# Patient Record
Sex: MALE | Race: WHITE | Employment: OTHER | URBAN - METROPOLITAN AREA
[De-identification: names, ages, dates, MRNs, and addresses within clinical notes are randomized per-mention and may not be internally consistent; named-entity substitution may affect disease eponyms.]

---

## 2017-01-31 RX ORDER — ALBUTEROL SULFATE 90 UG/1
AEROSOL, METERED RESPIRATORY (INHALATION)
Qty: 6.7 INHALER | Refills: 1 | Status: SHIPPED | OUTPATIENT
Start: 2017-01-31 | End: 2017-03-06 | Stop reason: SDUPTHER

## 2017-03-06 RX ORDER — ALBUTEROL SULFATE 90 MCG
HFA AEROSOL WITH ADAPTER (GRAM) INHALATION
Qty: 6.7 INHALER | Refills: 1 | Status: SHIPPED | OUTPATIENT
Start: 2017-03-06 | End: 2018-03-30 | Stop reason: ALTCHOICE

## 2017-03-31 ENCOUNTER — OFFICE VISIT (OUTPATIENT)
Dept: INTERNAL MEDICINE | Age: 67
End: 2017-03-31

## 2017-03-31 VITALS
BODY MASS INDEX: 21.95 KG/M2 | HEART RATE: 86 BPM | DIASTOLIC BLOOD PRESSURE: 86 MMHG | WEIGHT: 144.8 LBS | SYSTOLIC BLOOD PRESSURE: 164 MMHG | RESPIRATION RATE: 16 BRPM | HEIGHT: 68 IN

## 2017-03-31 DIAGNOSIS — I10 ESSENTIAL HYPERTENSION, BENIGN: Primary | ICD-10-CM

## 2017-03-31 DIAGNOSIS — G25.81 RLS (RESTLESS LEGS SYNDROME): ICD-10-CM

## 2017-03-31 DIAGNOSIS — E78.2 MIXED HYPERLIPIDEMIA: ICD-10-CM

## 2017-03-31 DIAGNOSIS — R63.4 WEIGHT DECREASE: ICD-10-CM

## 2017-03-31 DIAGNOSIS — J30.2 SEASONAL ALLERGIC RHINITIS, UNSPECIFIED ALLERGIC RHINITIS TRIGGER: Chronic | ICD-10-CM

## 2017-03-31 DIAGNOSIS — M1A.0790 IDIOPATHIC CHRONIC GOUT OF FOOT WITHOUT TOPHUS, UNSPECIFIED LATERALITY: ICD-10-CM

## 2017-03-31 DIAGNOSIS — Z72.0 TOBACCO ABUSE: ICD-10-CM

## 2017-03-31 DIAGNOSIS — E11.9 TYPE 2 DIABETES MELLITUS WITHOUT COMPLICATION, WITHOUT LONG-TERM CURRENT USE OF INSULIN (HCC): ICD-10-CM

## 2017-03-31 DIAGNOSIS — R05.9 COUGH: ICD-10-CM

## 2017-03-31 PROCEDURE — 99214 OFFICE O/P EST MOD 30 MIN: CPT | Performed by: INTERNAL MEDICINE

## 2017-03-31 RX ORDER — LISINOPRIL 40 MG/1
TABLET ORAL
Qty: 90 TABLET | Refills: 1 | Status: ON HOLD | OUTPATIENT
Start: 2017-03-31 | End: 2017-05-04 | Stop reason: CLARIF

## 2017-03-31 RX ORDER — AMLODIPINE BESYLATE 10 MG/1
10 TABLET ORAL
COMMUNITY
Start: 2017-03-31 | End: 2017-07-03 | Stop reason: SDUPTHER

## 2017-03-31 ASSESSMENT — ENCOUNTER SYMPTOMS
RHINORRHEA: 1
WHEEZING: 1
EYES NEGATIVE: 1
GASTROINTESTINAL NEGATIVE: 1
COUGH: 1
SHORTNESS OF BREATH: 1
ALLERGIC/IMMUNOLOGIC NEGATIVE: 1

## 2017-04-14 ENCOUNTER — HOSPITAL ENCOUNTER (OUTPATIENT)
Dept: CT IMAGING | Age: 67
Discharge: HOME | End: 2017-04-14
Attending: INTERNAL MEDICINE | Admitting: INTERNAL MEDICINE

## 2017-04-14 ENCOUNTER — TELEPHONE (OUTPATIENT)
Dept: PULMONOLOGY | Age: 67
End: 2017-04-14

## 2017-04-14 DIAGNOSIS — Z72.0 TOBACCO ABUSE: ICD-10-CM

## 2017-04-14 DIAGNOSIS — R63.4 WEIGHT DECREASE: ICD-10-CM

## 2017-04-14 DIAGNOSIS — Z72.0 TOBACCO USE: ICD-10-CM

## 2017-04-20 ENCOUNTER — OFFICE VISIT (OUTPATIENT)
Dept: PULMONOLOGY | Age: 67
End: 2017-04-20

## 2017-04-20 VITALS
OXYGEN SATURATION: 96 % | DIASTOLIC BLOOD PRESSURE: 98 MMHG | HEART RATE: 122 BPM | RESPIRATION RATE: 16 BRPM | WEIGHT: 138 LBS | BODY MASS INDEX: 20.92 KG/M2 | SYSTOLIC BLOOD PRESSURE: 184 MMHG | HEIGHT: 68 IN

## 2017-04-20 DIAGNOSIS — R94.5 ABNORMAL RESULTS OF LIVER FUNCTION STUDIES: ICD-10-CM

## 2017-04-20 DIAGNOSIS — R05.9 COUGH: ICD-10-CM

## 2017-04-20 DIAGNOSIS — C34.01 MALIGNANT NEOPLASM OF HILUS OF RIGHT LUNG (HCC): Primary | ICD-10-CM

## 2017-04-20 DIAGNOSIS — Z72.0 TOBACCO ABUSE: ICD-10-CM

## 2017-04-20 PROCEDURE — 99204 OFFICE O/P NEW MOD 45 MIN: CPT | Performed by: INTERNAL MEDICINE

## 2017-04-21 ENCOUNTER — TELEPHONE (OUTPATIENT)
Dept: PULMONOLOGY | Age: 67
End: 2017-04-21

## 2017-04-21 ENCOUNTER — TELEPHONE (OUTPATIENT)
Dept: CARDIOTHORACIC SURGERY | Age: 67
End: 2017-04-21

## 2017-04-24 ENCOUNTER — HOSPITAL ENCOUNTER (OUTPATIENT)
Dept: ENDOSCOPY | Age: 67
Discharge: HOME | End: 2017-04-24
Attending: INTERNAL MEDICINE | Admitting: INTERNAL MEDICINE

## 2017-04-24 ENCOUNTER — TELEPHONE (OUTPATIENT)
Dept: PULMONOLOGY | Age: 67
End: 2017-04-24

## 2017-04-24 VITALS
TEMPERATURE: 97.3 F | OXYGEN SATURATION: 94 % | SYSTOLIC BLOOD PRESSURE: 152 MMHG | BODY MASS INDEX: 20.92 KG/M2 | DIASTOLIC BLOOD PRESSURE: 73 MMHG | HEIGHT: 68 IN | HEART RATE: 96 BPM | WEIGHT: 138 LBS | RESPIRATION RATE: 23 BRPM

## 2017-04-24 LAB
ABO/RH: NORMAL
ANTIBODY SCREEN: NORMAL
GLUCOSE BLD-MCNC: 159 MG/DL (ref 70–99)
PERFORMED ON: ABNORMAL

## 2017-04-24 PROCEDURE — 31640 BRONCHOSCOPY W/TUMOR EXCISE: CPT | Performed by: INTERNAL MEDICINE

## 2017-04-24 PROCEDURE — 31629 BRONCHOSCOPY/NEEDLE BX EACH: CPT | Performed by: INTERNAL MEDICINE

## 2017-04-24 PROCEDURE — 31623 DX BRONCHOSCOPE/BRUSH: CPT | Performed by: INTERNAL MEDICINE

## 2017-04-24 PROCEDURE — 31628 BRONCHOSCOPY/LUNG BX EACH: CPT | Performed by: INTERNAL MEDICINE

## 2017-04-24 RX ORDER — MEPERIDINE HYDROCHLORIDE 25 MG/ML
12.5 INJECTION INTRAMUSCULAR; INTRAVENOUS; SUBCUTANEOUS EVERY 5 MIN PRN
Status: DISCONTINUED | OUTPATIENT
Start: 2017-04-24 | End: 2017-04-25 | Stop reason: HOSPADM

## 2017-04-24 RX ORDER — HYDRALAZINE HYDROCHLORIDE 20 MG/ML
5 INJECTION INTRAMUSCULAR; INTRAVENOUS EVERY 10 MIN PRN
Status: DISCONTINUED | OUTPATIENT
Start: 2017-04-24 | End: 2017-04-25 | Stop reason: HOSPADM

## 2017-04-24 RX ORDER — DIPHENHYDRAMINE HYDROCHLORIDE 50 MG/ML
12.5 INJECTION INTRAMUSCULAR; INTRAVENOUS
Status: ACTIVE | OUTPATIENT
Start: 2017-04-24 | End: 2017-04-24

## 2017-04-24 RX ORDER — SODIUM CHLORIDE, SODIUM LACTATE, POTASSIUM CHLORIDE, CALCIUM CHLORIDE 600; 310; 30; 20 MG/100ML; MG/100ML; MG/100ML; MG/100ML
INJECTION, SOLUTION INTRAVENOUS CONTINUOUS
Status: CANCELLED | OUTPATIENT
Start: 2017-04-24

## 2017-04-24 RX ORDER — PROMETHAZINE HYDROCHLORIDE 25 MG/ML
6.25 INJECTION, SOLUTION INTRAMUSCULAR; INTRAVENOUS
Status: ACTIVE | OUTPATIENT
Start: 2017-04-24 | End: 2017-04-24

## 2017-04-24 RX ORDER — LABETALOL HYDROCHLORIDE 5 MG/ML
5 INJECTION, SOLUTION INTRAVENOUS EVERY 10 MIN PRN
Status: DISCONTINUED | OUTPATIENT
Start: 2017-04-24 | End: 2017-04-25 | Stop reason: HOSPADM

## 2017-04-24 RX ORDER — ALBUTEROL SULFATE 2.5 MG/3ML
2.5 SOLUTION RESPIRATORY (INHALATION) ONCE
Status: COMPLETED | OUTPATIENT
Start: 2017-04-24 | End: 2017-04-24

## 2017-04-24 RX ORDER — FENTANYL CITRATE 50 UG/ML
25 INJECTION, SOLUTION INTRAMUSCULAR; INTRAVENOUS EVERY 5 MIN PRN
Status: DISCONTINUED | OUTPATIENT
Start: 2017-04-24 | End: 2017-04-25 | Stop reason: HOSPADM

## 2017-04-24 RX ORDER — ONDANSETRON 2 MG/ML
4 INJECTION INTRAMUSCULAR; INTRAVENOUS
Status: ACTIVE | OUTPATIENT
Start: 2017-04-24 | End: 2017-04-24

## 2017-04-24 RX ADMIN — ALBUTEROL SULFATE 2.5 MG: 2.5 SOLUTION RESPIRATORY (INHALATION) at 16:57

## 2017-04-24 ASSESSMENT — PAIN SCALES - GENERAL: PAINLEVEL_OUTOF10: 0

## 2017-04-25 ENCOUNTER — TELEPHONE (OUTPATIENT)
Dept: CARDIOTHORACIC SURGERY | Age: 67
End: 2017-04-25

## 2017-04-27 ENCOUNTER — HOSPITAL ENCOUNTER (OUTPATIENT)
Dept: ENDOSCOPY | Age: 67
Discharge: HOME | End: 2017-04-27

## 2017-04-27 ENCOUNTER — HOSPITAL ENCOUNTER (OUTPATIENT)
Dept: ENDOSCOPY | Age: 67
Discharge: HOME | End: 2017-04-27
Attending: THORACIC SURGERY (CARDIOTHORACIC VASCULAR SURGERY) | Admitting: THORACIC SURGERY (CARDIOTHORACIC VASCULAR SURGERY)

## 2017-04-27 VITALS
HEIGHT: 68 IN | DIASTOLIC BLOOD PRESSURE: 81 MMHG | RESPIRATION RATE: 18 BRPM | HEART RATE: 74 BPM | BODY MASS INDEX: 20.92 KG/M2 | SYSTOLIC BLOOD PRESSURE: 161 MMHG | WEIGHT: 138 LBS | OXYGEN SATURATION: 98 % | TEMPERATURE: 97.4 F

## 2017-04-27 VITALS
OXYGEN SATURATION: 98 % | DIASTOLIC BLOOD PRESSURE: 84 MMHG | TEMPERATURE: 97.8 F | RESPIRATION RATE: 16 BRPM | SYSTOLIC BLOOD PRESSURE: 168 MMHG | HEART RATE: 77 BPM

## 2017-04-27 LAB
GLUCOSE BLD-MCNC: 142 MG/DL (ref 70–99)
PERFORMED ON: ABNORMAL

## 2017-04-27 RX ORDER — HYDRALAZINE HYDROCHLORIDE 20 MG/ML
5 INJECTION INTRAMUSCULAR; INTRAVENOUS EVERY 5 MIN PRN
Status: DISCONTINUED | OUTPATIENT
Start: 2017-04-27 | End: 2017-04-28 | Stop reason: HOSPADM

## 2017-04-27 RX ORDER — MEPERIDINE HYDROCHLORIDE 25 MG/ML
12.5 INJECTION INTRAMUSCULAR; INTRAVENOUS; SUBCUTANEOUS EVERY 5 MIN PRN
Status: DISCONTINUED | OUTPATIENT
Start: 2017-04-27 | End: 2017-04-28 | Stop reason: HOSPADM

## 2017-04-27 RX ORDER — ONDANSETRON 2 MG/ML
4 INJECTION INTRAMUSCULAR; INTRAVENOUS
Status: ACTIVE | OUTPATIENT
Start: 2017-04-27 | End: 2017-04-27

## 2017-04-27 RX ORDER — FENTANYL CITRATE 50 UG/ML
25 INJECTION, SOLUTION INTRAMUSCULAR; INTRAVENOUS EVERY 5 MIN PRN
Status: DISCONTINUED | OUTPATIENT
Start: 2017-04-27 | End: 2017-04-28 | Stop reason: HOSPADM

## 2017-04-27 RX ORDER — LABETALOL HYDROCHLORIDE 5 MG/ML
5 INJECTION, SOLUTION INTRAVENOUS EVERY 5 MIN PRN
Status: DISCONTINUED | OUTPATIENT
Start: 2017-04-27 | End: 2017-04-28 | Stop reason: HOSPADM

## 2017-04-27 ASSESSMENT — PAIN SCALES - GENERAL
PAINLEVEL_OUTOF10: 0
PAINLEVEL_OUTOF10: 0

## 2017-04-28 ENCOUNTER — TELEPHONE (OUTPATIENT)
Dept: CARDIOTHORACIC SURGERY | Age: 67
End: 2017-04-28

## 2017-05-03 PROBLEM — C77.1 SECONDARY MALIGNANT NEOPLASM OF INTRATHORACIC LYMPH NODES (HCC): Status: ACTIVE | Noted: 2017-05-03

## 2017-05-03 PROBLEM — C34.2 PRIMARY CANCER OF RIGHT MIDDLE LOBE OF LUNG (HCC): Status: ACTIVE | Noted: 2017-05-03

## 2017-05-05 ENCOUNTER — HOSPITAL ENCOUNTER (OUTPATIENT)
Dept: MRI IMAGING | Age: 67
Discharge: HOME | End: 2017-05-05
Attending: INTERNAL MEDICINE | Admitting: INTERNAL MEDICINE

## 2017-05-05 DIAGNOSIS — C34.2 MALIGNANT NEOPLASM OF MIDDLE LOBE, BRONCHUS OR LUNG (HCC): ICD-10-CM

## 2017-05-11 ENCOUNTER — TELEPHONE (OUTPATIENT)
Dept: PULMONOLOGY | Age: 67
End: 2017-05-11

## 2017-05-11 DIAGNOSIS — J95.811 POSTPROCEDURAL PNEUMOTHORAX: Primary | ICD-10-CM

## 2017-05-12 ENCOUNTER — TELEPHONE (OUTPATIENT)
Dept: PULMONOLOGY | Age: 67
End: 2017-05-12

## 2017-05-12 ENCOUNTER — HOSPITAL ENCOUNTER (OUTPATIENT)
Dept: CT IMAGING | Age: 67
Discharge: HOME | End: 2017-05-12
Attending: INTERNAL MEDICINE | Admitting: INTERNAL MEDICINE

## 2017-05-12 DIAGNOSIS — J95.811 POSTPROCEDURAL PNEUMOTHORAX: ICD-10-CM

## 2017-05-17 ENCOUNTER — OFFICE VISIT (OUTPATIENT)
Dept: CARDIOTHORACIC SURGERY | Age: 67
End: 2017-05-17

## 2017-05-17 VITALS
HEIGHT: 68 IN | SYSTOLIC BLOOD PRESSURE: 138 MMHG | OXYGEN SATURATION: 98 % | WEIGHT: 137 LBS | DIASTOLIC BLOOD PRESSURE: 64 MMHG | TEMPERATURE: 98.3 F | HEART RATE: 89 BPM | BODY MASS INDEX: 20.76 KG/M2

## 2017-05-17 DIAGNOSIS — J98.11 ATELECTASIS OF RIGHT LUNG: Primary | ICD-10-CM

## 2017-05-17 DIAGNOSIS — R91.8 LUNG MASS: ICD-10-CM

## 2017-05-17 PROCEDURE — 99213 OFFICE O/P EST LOW 20 MIN: CPT | Performed by: THORACIC SURGERY (CARDIOTHORACIC VASCULAR SURGERY)

## 2017-05-18 PROBLEM — Z51.11 ENCOUNTER FOR ANTINEOPLASTIC CHEMOTHERAPY: Status: ACTIVE | Noted: 2017-05-18

## 2017-05-31 ENCOUNTER — OFFICE VISIT (OUTPATIENT)
Dept: PULMONOLOGY | Age: 67
End: 2017-05-31

## 2017-05-31 VITALS
BODY MASS INDEX: 20.76 KG/M2 | HEIGHT: 68 IN | HEART RATE: 114 BPM | RESPIRATION RATE: 16 BRPM | SYSTOLIC BLOOD PRESSURE: 160 MMHG | DIASTOLIC BLOOD PRESSURE: 82 MMHG | OXYGEN SATURATION: 99 % | WEIGHT: 137 LBS

## 2017-05-31 DIAGNOSIS — J95.811 POSTPROCEDURAL PNEUMOTHORAX: ICD-10-CM

## 2017-05-31 DIAGNOSIS — C34.91 SQUAMOUS CELL LUNG CANCER, RIGHT (HCC): Primary | ICD-10-CM

## 2017-05-31 PROCEDURE — 99214 OFFICE O/P EST MOD 30 MIN: CPT | Performed by: INTERNAL MEDICINE

## 2017-06-07 ENCOUNTER — OFFICE VISIT (OUTPATIENT)
Dept: CARDIOTHORACIC SURGERY | Age: 67
End: 2017-06-07

## 2017-06-07 VITALS
HEIGHT: 68 IN | SYSTOLIC BLOOD PRESSURE: 122 MMHG | DIASTOLIC BLOOD PRESSURE: 80 MMHG | BODY MASS INDEX: 20.61 KG/M2 | WEIGHT: 136 LBS | HEART RATE: 111 BPM | TEMPERATURE: 97.1 F | OXYGEN SATURATION: 99 %

## 2017-06-07 DIAGNOSIS — C34.91 SQUAMOUS CELL LUNG CANCER, RIGHT (HCC): ICD-10-CM

## 2017-06-07 DIAGNOSIS — Z72.0 TOBACCO ABUSE: ICD-10-CM

## 2017-06-07 DIAGNOSIS — R05.9 COUGH: Primary | ICD-10-CM

## 2017-06-07 PROCEDURE — 99212 OFFICE O/P EST SF 10 MIN: CPT | Performed by: THORACIC SURGERY (CARDIOTHORACIC VASCULAR SURGERY)

## 2017-07-03 RX ORDER — AMLODIPINE BESYLATE 10 MG/1
TABLET ORAL
Qty: 30 TABLET | Refills: 2 | Status: SHIPPED | OUTPATIENT
Start: 2017-07-03 | End: 2017-08-22 | Stop reason: SDUPTHER

## 2017-07-12 ENCOUNTER — OFFICE VISIT (OUTPATIENT)
Dept: CARDIOTHORACIC SURGERY | Age: 67
End: 2017-07-12

## 2017-07-12 ENCOUNTER — TELEPHONE (OUTPATIENT)
Dept: PULMONOLOGY | Age: 67
End: 2017-07-12

## 2017-07-12 ENCOUNTER — OFFICE VISIT (OUTPATIENT)
Dept: PULMONOLOGY | Age: 67
End: 2017-07-12

## 2017-07-12 VITALS
WEIGHT: 129.6 LBS | SYSTOLIC BLOOD PRESSURE: 130 MMHG | HEIGHT: 68 IN | BODY MASS INDEX: 19.64 KG/M2 | DIASTOLIC BLOOD PRESSURE: 74 MMHG | RESPIRATION RATE: 18 BRPM | HEART RATE: 119 BPM | OXYGEN SATURATION: 100 %

## 2017-07-12 VITALS
SYSTOLIC BLOOD PRESSURE: 120 MMHG | OXYGEN SATURATION: 100 % | HEIGHT: 68 IN | TEMPERATURE: 97.2 F | BODY MASS INDEX: 19.55 KG/M2 | HEART RATE: 107 BPM | DIASTOLIC BLOOD PRESSURE: 80 MMHG | WEIGHT: 129 LBS

## 2017-07-12 DIAGNOSIS — C34.2 PRIMARY CANCER OF RIGHT MIDDLE LOBE OF LUNG (HCC): ICD-10-CM

## 2017-07-12 DIAGNOSIS — C34.91 SQUAMOUS CELL LUNG CANCER, RIGHT (HCC): Primary | ICD-10-CM

## 2017-07-12 DIAGNOSIS — J42 CHRONIC BRONCHITIS, UNSPECIFIED CHRONIC BRONCHITIS TYPE (HCC): ICD-10-CM

## 2017-07-12 DIAGNOSIS — J95.811 POSTPROCEDURAL PNEUMOTHORAX: Primary | ICD-10-CM

## 2017-07-12 PROCEDURE — 99212 OFFICE O/P EST SF 10 MIN: CPT | Performed by: THORACIC SURGERY (CARDIOTHORACIC VASCULAR SURGERY)

## 2017-07-12 PROCEDURE — 99214 OFFICE O/P EST MOD 30 MIN: CPT | Performed by: INTERNAL MEDICINE

## 2017-08-15 ENCOUNTER — HOSPITAL ENCOUNTER (OUTPATIENT)
Dept: CT IMAGING | Age: 67
Discharge: HOME | End: 2017-08-15
Attending: INTERNAL MEDICINE | Admitting: INTERNAL MEDICINE

## 2017-08-15 DIAGNOSIS — C34.2 PRIMARY CANCER OF RIGHT MIDDLE LOBE OF LUNG (HCC): ICD-10-CM

## 2017-08-15 DIAGNOSIS — C34.2 MALIGNANT NEOPLASM OF MIDDLE LOBE, BRONCHUS OR LUNG (HCC): ICD-10-CM

## 2017-08-16 ENCOUNTER — OFFICE VISIT (OUTPATIENT)
Dept: CARDIOTHORACIC SURGERY | Age: 67
End: 2017-08-16

## 2017-08-16 VITALS
BODY MASS INDEX: 19.4 KG/M2 | SYSTOLIC BLOOD PRESSURE: 138 MMHG | WEIGHT: 128 LBS | HEIGHT: 68 IN | HEART RATE: 97 BPM | TEMPERATURE: 97.9 F | OXYGEN SATURATION: 99 % | DIASTOLIC BLOOD PRESSURE: 86 MMHG

## 2017-08-16 DIAGNOSIS — R91.8 LUNG MASS: ICD-10-CM

## 2017-08-16 DIAGNOSIS — Z72.0 TOBACCO ABUSE: ICD-10-CM

## 2017-08-16 DIAGNOSIS — C34.2 MALIGNANT NEOPLASM OF MIDDLE LOBE OF RIGHT LUNG (HCC): Primary | ICD-10-CM

## 2017-08-16 PROCEDURE — 99212 OFFICE O/P EST SF 10 MIN: CPT | Performed by: THORACIC SURGERY (CARDIOTHORACIC VASCULAR SURGERY)

## 2017-08-18 ENCOUNTER — TELEPHONE (OUTPATIENT)
Dept: CARDIOTHORACIC SURGERY | Age: 67
End: 2017-08-18

## 2017-08-22 ENCOUNTER — TELEPHONE (OUTPATIENT)
Dept: INTERNAL MEDICINE | Age: 67
End: 2017-08-22

## 2017-08-22 RX ORDER — AMLODIPINE BESYLATE 10 MG/1
TABLET ORAL
Qty: 90 TABLET | Refills: 0 | Status: SHIPPED | OUTPATIENT
Start: 2017-08-22 | End: 2017-11-17 | Stop reason: SDUPTHER

## 2017-08-24 ENCOUNTER — TELEPHONE (OUTPATIENT)
Dept: CARDIOTHORACIC SURGERY | Age: 67
End: 2017-08-24

## 2017-08-24 DIAGNOSIS — C34.11 MALIGNANT NEOPLASM OF UPPER LOBE OF RIGHT LUNG (HCC): Primary | ICD-10-CM

## 2017-08-24 RX ORDER — PROMETHAZINE HYDROCHLORIDE AND CODEINE PHOSPHATE 6.25; 1 MG/5ML; MG/5ML
SYRUP ORAL
Qty: 150 ML | Refills: 0 | OUTPATIENT
Start: 2017-08-24 | End: 2017-09-20

## 2017-08-30 ENCOUNTER — OFFICE VISIT (OUTPATIENT)
Dept: CARDIOTHORACIC SURGERY | Age: 67
End: 2017-08-30

## 2017-08-30 VITALS
HEART RATE: 90 BPM | TEMPERATURE: 98 F | HEIGHT: 68 IN | DIASTOLIC BLOOD PRESSURE: 80 MMHG | BODY MASS INDEX: 19.85 KG/M2 | WEIGHT: 131 LBS | OXYGEN SATURATION: 99 % | SYSTOLIC BLOOD PRESSURE: 132 MMHG

## 2017-08-30 DIAGNOSIS — C34.2 PRIMARY CANCER OF RIGHT MIDDLE LOBE OF LUNG (HCC): Primary | ICD-10-CM

## 2017-08-30 DIAGNOSIS — R91.8 LUNG MASS: ICD-10-CM

## 2017-08-30 PROCEDURE — 99212 OFFICE O/P EST SF 10 MIN: CPT | Performed by: THORACIC SURGERY (CARDIOTHORACIC VASCULAR SURGERY)

## 2017-09-01 ENCOUNTER — TELEPHONE (OUTPATIENT)
Dept: CARDIOTHORACIC SURGERY | Age: 67
End: 2017-09-01

## 2017-09-06 ENCOUNTER — HOSPITAL ENCOUNTER (OUTPATIENT)
Dept: PREADMISSION TESTING | Age: 67
Discharge: HOME | End: 2017-09-06
Attending: THORACIC SURGERY (CARDIOTHORACIC VASCULAR SURGERY) | Admitting: THORACIC SURGERY (CARDIOTHORACIC VASCULAR SURGERY)

## 2017-09-06 VITALS
HEART RATE: 116 BPM | WEIGHT: 132 LBS | BODY MASS INDEX: 20 KG/M2 | HEIGHT: 68 IN | SYSTOLIC BLOOD PRESSURE: 174 MMHG | TEMPERATURE: 97.4 F | RESPIRATION RATE: 18 BRPM | DIASTOLIC BLOOD PRESSURE: 95 MMHG

## 2017-09-06 LAB
ABO/RH: NORMAL
ALBUMIN SERPL-MCNC: 4.4 G/DL (ref 3.4–5)
ALP BLD-CCNC: 137 U/L (ref 40–129)
ALT SERPL-CCNC: 10 U/L (ref 10–40)
ANION GAP SERPL CALCULATED.3IONS-SCNC: 17 MMOL/L (ref 3–16)
ANTIBODY SCREEN: NORMAL
APTT: 29.8 SEC (ref 24.1–34.9)
AST SERPL-CCNC: 13 U/L (ref 15–37)
BASOPHILS ABSOLUTE: 0.1 K/UL (ref 0–0.2)
BASOPHILS RELATIVE PERCENT: 1 %
BILIRUB SERPL-MCNC: <0.2 MG/DL (ref 0–1)
BILIRUBIN DIRECT: <0.2 MG/DL (ref 0–0.3)
BILIRUBIN URINE: NEGATIVE
BILIRUBIN, INDIRECT: ABNORMAL MG/DL (ref 0–1)
BLOOD, URINE: NEGATIVE
BUN BLDV-MCNC: 14 MG/DL (ref 7–20)
CALCIUM SERPL-MCNC: 10.1 MG/DL (ref 8.3–10.6)
CHLORIDE BLD-SCNC: 95 MMOL/L (ref 99–110)
CLARITY: CLEAR
CO2: 25 MMOL/L (ref 21–32)
COLOR: YELLOW
CREAT SERPL-MCNC: 0.7 MG/DL (ref 0.8–1.3)
EKG ATRIAL RATE: 97 BPM
EKG DIAGNOSIS: NORMAL
EKG P AXIS: 56 DEGREES
EKG P-R INTERVAL: 154 MS
EKG Q-T INTERVAL: 334 MS
EKG QRS DURATION: 74 MS
EKG QTC CALCULATION (BAZETT): 424 MS
EKG R AXIS: 76 DEGREES
EKG T AXIS: 66 DEGREES
EKG VENTRICULAR RATE: 97 BPM
EOSINOPHILS ABSOLUTE: 0.1 K/UL (ref 0–0.6)
EOSINOPHILS RELATIVE PERCENT: 1.6 %
GFR AFRICAN AMERICAN: >60
GFR NON-AFRICAN AMERICAN: >60
GLUCOSE BLD-MCNC: 101 MG/DL (ref 70–99)
GLUCOSE URINE: NEGATIVE MG/DL
HCT VFR BLD CALC: 35.6 % (ref 40.5–52.5)
HEMOGLOBIN: 12 G/DL (ref 13.5–17.5)
INR BLD: 0.88 (ref 0.85–1.15)
KETONES, URINE: NEGATIVE MG/DL
LEUKOCYTE ESTERASE, URINE: NEGATIVE
LYMPHOCYTES ABSOLUTE: 0.9 K/UL (ref 1–5.1)
LYMPHOCYTES RELATIVE PERCENT: 13.8 %
MAGNESIUM: 2 MG/DL (ref 1.8–2.4)
MCH RBC QN AUTO: 31.9 PG (ref 26–34)
MCHC RBC AUTO-ENTMCNC: 33.7 G/DL (ref 31–36)
MCV RBC AUTO: 94.5 FL (ref 80–100)
MICROSCOPIC EXAMINATION: NORMAL
MONOCYTES ABSOLUTE: 0.6 K/UL (ref 0–1.3)
MONOCYTES RELATIVE PERCENT: 8.4 %
NEUTROPHILS ABSOLUTE: 5 K/UL (ref 1.7–7.7)
NEUTROPHILS RELATIVE PERCENT: 75.2 %
NITRITE, URINE: NEGATIVE
PDW BLD-RTO: 16.2 % (ref 12.4–15.4)
PH UA: 6
PLATELET # BLD: 236 K/UL (ref 135–450)
PMV BLD AUTO: 7.2 FL (ref 5–10.5)
POTASSIUM SERPL-SCNC: 4.2 MMOL/L (ref 3.5–5.1)
PROTEIN UA: NEGATIVE MG/DL
PROTHROMBIN TIME: 9.9 SEC (ref 9.6–13)
RBC # BLD: 3.77 M/UL (ref 4.2–5.9)
SODIUM BLD-SCNC: 137 MMOL/L (ref 136–145)
SPECIFIC GRAVITY UA: 1.01
TOTAL PROTEIN: 8.2 G/DL (ref 6.4–8.2)
URINE TYPE: NORMAL
UROBILINOGEN, URINE: 0.2 E.U./DL
WBC # BLD: 6.6 K/UL (ref 4–11)

## 2017-09-06 PROCEDURE — 93010 ELECTROCARDIOGRAM REPORT: CPT | Performed by: INTERNAL MEDICINE

## 2017-09-07 LAB — URINE CULTURE, ROUTINE: NORMAL

## 2017-09-12 ENCOUNTER — HOSPITAL ENCOUNTER (OUTPATIENT)
Dept: ENDOSCOPY | Age: 67
Discharge: HOME | End: 2017-09-12
Attending: THORACIC SURGERY (CARDIOTHORACIC VASCULAR SURGERY) | Admitting: THORACIC SURGERY (CARDIOTHORACIC VASCULAR SURGERY)

## 2017-09-12 VITALS
BODY MASS INDEX: 20 KG/M2 | HEART RATE: 88 BPM | WEIGHT: 132 LBS | RESPIRATION RATE: 18 BRPM | DIASTOLIC BLOOD PRESSURE: 80 MMHG | OXYGEN SATURATION: 96 % | HEIGHT: 68 IN | TEMPERATURE: 97.5 F | SYSTOLIC BLOOD PRESSURE: 144 MMHG

## 2017-09-12 LAB
ABO/RH: NORMAL
ANTIBODY SCREEN: NORMAL
GLUCOSE BLD-MCNC: 135 MG/DL (ref 70–99)
GLUCOSE BLD-MCNC: 157 MG/DL (ref 70–99)
PERFORMED ON: ABNORMAL
PERFORMED ON: ABNORMAL

## 2017-09-12 PROCEDURE — 31645 BRNCHSC W/THER ASPIR 1ST: CPT | Performed by: INTERNAL MEDICINE

## 2017-09-12 PROCEDURE — 31652 BRONCH EBUS SAMPLNG 1/2 NODE: CPT | Performed by: INTERNAL MEDICINE

## 2017-09-12 PROCEDURE — 31623 DX BRONCHOSCOPE/BRUSH: CPT | Performed by: INTERNAL MEDICINE

## 2017-09-12 RX ORDER — SODIUM CHLORIDE 0.9 % (FLUSH) 0.9 %
10 SYRINGE (ML) INJECTION PRN
Status: DISCONTINUED | OUTPATIENT
Start: 2017-09-12 | End: 2017-09-13 | Stop reason: HOSPADM

## 2017-09-12 RX ORDER — SODIUM CHLORIDE 9 MG/ML
INJECTION, SOLUTION INTRAVENOUS CONTINUOUS
Status: DISCONTINUED | OUTPATIENT
Start: 2017-09-12 | End: 2017-09-13 | Stop reason: HOSPADM

## 2017-09-12 RX ORDER — ONDANSETRON 2 MG/ML
4 INJECTION INTRAMUSCULAR; INTRAVENOUS ONCE
Status: COMPLETED | OUTPATIENT
Start: 2017-09-12 | End: 2017-09-12

## 2017-09-12 RX ORDER — ENALAPRILAT 2.5 MG/2ML
1.25 INJECTION INTRAVENOUS
Status: ACTIVE | OUTPATIENT
Start: 2017-09-12 | End: 2017-09-12

## 2017-09-12 RX ORDER — FENTANYL CITRATE 50 UG/ML
25 INJECTION, SOLUTION INTRAMUSCULAR; INTRAVENOUS EVERY 5 MIN PRN
Status: DISCONTINUED | OUTPATIENT
Start: 2017-09-12 | End: 2017-09-13 | Stop reason: HOSPADM

## 2017-09-12 RX ORDER — GLYCOPYRROLATE 0.2 MG/ML
0.2 INJECTION INTRAMUSCULAR; INTRAVENOUS ONCE
Status: COMPLETED | OUTPATIENT
Start: 2017-09-12 | End: 2017-09-12

## 2017-09-12 RX ORDER — CHLORHEXIDINE GLUCONATE 0.12 MG/ML
15 RINSE ORAL 2 TIMES DAILY
Status: DISCONTINUED | OUTPATIENT
Start: 2017-09-12 | End: 2017-09-13 | Stop reason: HOSPADM

## 2017-09-12 RX ORDER — MORPHINE SULFATE 2 MG/ML
2 INJECTION, SOLUTION INTRAMUSCULAR; INTRAVENOUS EVERY 5 MIN PRN
Status: DISCONTINUED | OUTPATIENT
Start: 2017-09-12 | End: 2017-09-13 | Stop reason: HOSPADM

## 2017-09-12 RX ORDER — LIDOCAINE HYDROCHLORIDE 10 MG/ML
1 INJECTION, SOLUTION EPIDURAL; INFILTRATION; INTRACAUDAL; PERINEURAL
Status: ACTIVE | OUTPATIENT
Start: 2017-09-12 | End: 2017-09-12

## 2017-09-12 RX ORDER — SODIUM CHLORIDE 0.9 % (FLUSH) 0.9 %
10 SYRINGE (ML) INJECTION EVERY 12 HOURS SCHEDULED
Status: DISCONTINUED | OUTPATIENT
Start: 2017-09-12 | End: 2017-09-13 | Stop reason: HOSPADM

## 2017-09-12 RX ORDER — LABETALOL HYDROCHLORIDE 5 MG/ML
5 INJECTION, SOLUTION INTRAVENOUS EVERY 10 MIN PRN
Status: DISCONTINUED | OUTPATIENT
Start: 2017-09-12 | End: 2017-09-13 | Stop reason: HOSPADM

## 2017-09-12 RX ORDER — ONDANSETRON 2 MG/ML
4 INJECTION INTRAMUSCULAR; INTRAVENOUS
Status: ACTIVE | OUTPATIENT
Start: 2017-09-12 | End: 2017-09-12

## 2017-09-12 RX ORDER — SODIUM CHLORIDE, SODIUM LACTATE, POTASSIUM CHLORIDE, CALCIUM CHLORIDE 600; 310; 30; 20 MG/100ML; MG/100ML; MG/100ML; MG/100ML
INJECTION, SOLUTION INTRAVENOUS CONTINUOUS
Status: DISCONTINUED | OUTPATIENT
Start: 2017-09-12 | End: 2017-09-13 | Stop reason: HOSPADM

## 2017-09-12 RX ORDER — HYDRALAZINE HYDROCHLORIDE 20 MG/ML
5 INJECTION INTRAMUSCULAR; INTRAVENOUS EVERY 5 MIN PRN
Status: DISCONTINUED | OUTPATIENT
Start: 2017-09-12 | End: 2017-09-13 | Stop reason: HOSPADM

## 2017-09-12 ASSESSMENT — PAIN DESCRIPTION - LOCATION: LOCATION: THROAT

## 2017-09-12 ASSESSMENT — PAIN DESCRIPTION - DESCRIPTORS: DESCRIPTORS: ACHING

## 2017-09-12 ASSESSMENT — PAIN SCALES - GENERAL
PAINLEVEL_OUTOF10: 3
PAINLEVEL_OUTOF10: 4

## 2017-09-12 ASSESSMENT — PAIN - FUNCTIONAL ASSESSMENT: PAIN_FUNCTIONAL_ASSESSMENT: 0-10

## 2017-09-12 ASSESSMENT — PAIN DESCRIPTION - PAIN TYPE: TYPE: SURGICAL PAIN

## 2017-09-14 ENCOUNTER — TELEPHONE (OUTPATIENT)
Dept: CARDIOTHORACIC SURGERY | Age: 67
End: 2017-09-14

## 2017-09-20 ENCOUNTER — OFFICE VISIT (OUTPATIENT)
Dept: CARDIOTHORACIC SURGERY | Age: 67
End: 2017-09-20

## 2017-09-20 VITALS
OXYGEN SATURATION: 98 % | SYSTOLIC BLOOD PRESSURE: 146 MMHG | WEIGHT: 135 LBS | DIASTOLIC BLOOD PRESSURE: 96 MMHG | TEMPERATURE: 97.7 F | BODY MASS INDEX: 20.46 KG/M2 | HEIGHT: 68 IN | HEART RATE: 78 BPM

## 2017-09-20 DIAGNOSIS — C34.01 LUNG CANCER, MAIN BRONCHUS, RIGHT (HCC): Primary | ICD-10-CM

## 2017-09-20 PROCEDURE — 99212 OFFICE O/P EST SF 10 MIN: CPT | Performed by: THORACIC SURGERY (CARDIOTHORACIC VASCULAR SURGERY)

## 2017-09-22 ENCOUNTER — OFFICE VISIT (OUTPATIENT)
Dept: PULMONOLOGY | Age: 67
End: 2017-09-22

## 2017-09-22 VITALS
RESPIRATION RATE: 16 BRPM | BODY MASS INDEX: 20.61 KG/M2 | HEART RATE: 102 BPM | OXYGEN SATURATION: 99 % | HEIGHT: 68 IN | DIASTOLIC BLOOD PRESSURE: 70 MMHG | WEIGHT: 136 LBS | SYSTOLIC BLOOD PRESSURE: 138 MMHG

## 2017-09-22 DIAGNOSIS — C34.11 MALIGNANT NEOPLASM OF UPPER LOBE OF RIGHT LUNG (HCC): Primary | ICD-10-CM

## 2017-09-22 DIAGNOSIS — J44.9 COPD, SEVERITY TO BE DETERMINED (HCC): ICD-10-CM

## 2017-09-22 PROCEDURE — 99214 OFFICE O/P EST MOD 30 MIN: CPT | Performed by: INTERNAL MEDICINE

## 2017-09-25 ENCOUNTER — TELEPHONE (OUTPATIENT)
Dept: CARDIOTHORACIC SURGERY | Age: 67
End: 2017-09-25

## 2017-10-27 ENCOUNTER — HOSPITAL ENCOUNTER (OUTPATIENT)
Dept: PULMONOLOGY | Age: 67
Discharge: HOME | End: 2017-10-27
Attending: ORTHOPAEDIC SURGERY | Admitting: ORTHOPAEDIC SURGERY

## 2017-10-27 DIAGNOSIS — C34.11 MALIGNANT NEOPLASM OF UPPER LOBE OF RIGHT LUNG (HCC): ICD-10-CM

## 2017-10-27 DIAGNOSIS — C34.11 MALIGNANT NEOPLASM OF UPPER LOBE, RIGHT BRONCHUS OR LUNG (HCC): ICD-10-CM

## 2017-10-27 RX ORDER — ALBUTEROL SULFATE 2.5 MG/3ML
2.5 SOLUTION RESPIRATORY (INHALATION) ONCE
Status: COMPLETED | OUTPATIENT
Start: 2017-10-27 | End: 2017-10-27

## 2017-10-27 RX ADMIN — ALBUTEROL SULFATE 2.5 MG: 2.5 SOLUTION RESPIRATORY (INHALATION) at 11:40

## 2017-11-02 ENCOUNTER — TELEPHONE (OUTPATIENT)
Dept: CARDIOTHORACIC SURGERY | Age: 67
End: 2017-11-02

## 2017-11-02 NOTE — TELEPHONE ENCOUNTER
Dr. Christie Kulkarni reviewed patient's CT scan and PFT. Suprahilar mass has decreased in size. Dr. Christie Kulkarni wants a repeat CT scan in 3 months to follow this. I called patient and we discussed the results and he verbalizes understanding. He is agreeable to repeat the test in 3 months. Note sent to Emory Johnson  to place in surveillance.

## 2017-11-09 ENCOUNTER — TELEPHONE (OUTPATIENT)
Dept: PULMONOLOGY | Age: 67
End: 2017-11-09

## 2017-11-09 NOTE — TELEPHONE ENCOUNTER
Changes on CT could be from radiation. If he's asymptomatic, meaning that he's not coughing up or short of breath, then we can see how he's doing at the scheduled follow up. If he's having new symptoms then he should come in sooner.

## 2017-11-09 NOTE — TELEPHONE ENCOUNTER
Please advise patient had CT scan done is the findings urgent, or is it ok to go over at his 8 week f/u

## 2017-11-17 RX ORDER — AMLODIPINE BESYLATE 10 MG/1
TABLET ORAL
Qty: 90 TABLET | Refills: 0 | Status: SHIPPED | OUTPATIENT
Start: 2017-11-17 | End: 2022-01-19 | Stop reason: SDUPTHER

## 2017-11-20 RX ORDER — LISINOPRIL 40 MG/1
TABLET ORAL
Qty: 30 TABLET | Refills: 0 | Status: SHIPPED | OUTPATIENT
Start: 2017-11-20 | End: 2017-12-16 | Stop reason: SDUPTHER

## 2017-11-30 ENCOUNTER — OFFICE VISIT (OUTPATIENT)
Dept: PULMONOLOGY | Age: 67
End: 2017-11-30

## 2017-11-30 VITALS
SYSTOLIC BLOOD PRESSURE: 148 MMHG | WEIGHT: 142.8 LBS | HEART RATE: 114 BPM | HEIGHT: 68 IN | OXYGEN SATURATION: 100 % | DIASTOLIC BLOOD PRESSURE: 86 MMHG | RESPIRATION RATE: 16 BRPM | BODY MASS INDEX: 21.64 KG/M2

## 2017-11-30 DIAGNOSIS — C34.11 MALIGNANT NEOPLASM OF UPPER LOBE OF RIGHT LUNG (HCC): Primary | ICD-10-CM

## 2017-11-30 DIAGNOSIS — J41.0 SIMPLE CHRONIC BRONCHITIS (HCC): ICD-10-CM

## 2017-11-30 PROCEDURE — 99213 OFFICE O/P EST LOW 20 MIN: CPT | Performed by: INTERNAL MEDICINE

## 2017-11-30 NOTE — PROGRESS NOTES
MG tablet Take 40 mg by mouth daily    Yes Historical Provider, MD   aspirin 81 MG tablet Take 81 mg by mouth daily   Yes Historical Provider, MD   metFORMIN (GLUCOPHAGE) 500 MG tablet Take 2 tablets by mouth 2 times daily (with meals) 3/31/17  Yes Era Alexander MD   PROVENTIL  (90 BASE) MCG/ACT inhaler INHALE 2 PUFFS INTO THE LUNGS EVERY 6 HOURS AS NEEDED FOR WHEEZING. 3/6/17  Yes Era Alexander MD   rosuvastatin (CRESTOR) 40 MG tablet TAKE 1 TABLET BY MOUTH EVERY DAY 3/11/16  Yes Era Alexander MD       ROS    Physical Exam:      Vitals: BP (!) 148/86 (Site: Left Arm, Position: Sitting, Cuff Size: Medium Adult)   Pulse 114   Resp 16   Ht 5' 8\" (1.727 m)   Wt 142 lb 12.8 oz (64.8 kg)   SpO2 100%   BMI 21.71 kg/m²     Body mass index is 21.71 kg/m². Wt Readings from Last 3 Encounters:   11/30/17 142 lb 12.8 oz (64.8 kg)   09/22/17 136 lb (61.7 kg)   09/20/17 135 lb (61.2 kg)       General:  Patient conversant, appears comfortable, appears stated age. Skin: No rash, No jaundice. Head: NCAT  Eyes: PERRL. No conjunctival Injection. No Scleral Icterus. EOMI. Nose: Nares Symmetric, No tenderness or discharge  Mouth, Throat: No erythema, exudates. Neck: Supple, No cervical Lymphadenopathy, Normal Thyroid size/no goiter. Heart: RRR, nl s1, s2 no murmurs, rubs, gallops  Lungs: CTA B, no wheezing, no rhonchi, no rales. Abdomen: Soft non tender, non-distended. MSK: Muscle strength grossly intact. Vascular: Radial pulses present, normal b/l. Dorsalis Pedis pulses present, normal b/l. Neurologcial: No focal deficits. Cranial Nerves 2-12 intact. Sensation grossly normal.       LABS:    Chemistry:  No results for input(s): BUN, CREATININE, NA, K, CO2, CL, MG, PHOS, AST, ALT, ALB, PROT in the last 72 hours. Invalid input(s): GLU, CA, TBILI, DBILI, ALP, GLUFASTING    No results for input(s): ALKPHOS, ALT, AST, PROT, BILITOT, BILIDIR, LABALBU in the last 72 hours.      No results for input(s): Cindi Brennan, LABMICR, MICROALBUR, [place] in the last 72 hours. Lab Results   Component Value Date    TSH 1.89 03/28/2017       Hematology:  No results for input(s): WBC, HGB, HCT, PLT, MCV, MCH, MCHC, RDW, EOSABS in the last 72 hours. Invalid input(s): NEUTP, LYMPHP, MONOSP, EOSP, BASOP, NEUTABS, LYMPHABS, MONOABS, BASOABS    No results found for: IRON, TIBC, FERRITIN, FOLATE, [id number], PTH    Lipid:  Lab Results   Component Value Date    CHOL 202 (H) 03/28/2017    CHOL 153 03/28/2017    HDL 49 03/28/2017    LDLCALC 125 03/28/2017    TRIG 141 03/28/2017       U/A:  Lab Results   Component Value Date    LABMICR Not Indicated 09/06/2017       Imaging:   No results found. Health Maintenance   INFLUENZA: will receive it at PCP       Active Problems:     1. Lung Cancer (currently in surveillance)  2. Right chest wall pain    Assessment/Plan:   1. Lung Cancer (Squamous cell carcinoma). Diagnosed on CT scan 4/14/2017 (weight loss, short of breath). Stage IIIB NSCLC. Radiation from 5/24/2017 - 7/11/2017. Chemotherapy Carbplatin/Taxol complete 7/2017, follows with Dr. Zuleyma Poe. - PET/CT scan last 8/24/2017 showed improvement in size and decrease hypermetabolism of R hilar mass. - Most recent scan CT chest wo contrast (10/27/2017) showed improved R suprahilar mass with slightly decreased size. Will order repeat CT chest wo contrast for 1/2018 to monitor lung cancer.  - From EMR appears that Dr. Zuleyma Poe office has been trying to contact patient but he says he is not aware that he is supposed to follow up with Dr. Zuleyma Poe. Told him today that he should be following up with him and we will send a note to Dr. Zuleyma Poe as well. - Not smoking since diagnosis of lung cancer. 2.  Right chest wall pain. Appears to be secondary to chest tube, radiation therapy. Will continue to monitor and consider neurontin for neuropathic pain if persistent.     Case will be discussed with preceptor,

## 2017-12-18 RX ORDER — LISINOPRIL 40 MG/1
TABLET ORAL
Qty: 15 TABLET | Refills: 0 | Status: SHIPPED | OUTPATIENT
Start: 2017-12-18 | End: 2017-12-31 | Stop reason: SDUPTHER

## 2017-12-20 ENCOUNTER — OFFICE VISIT (OUTPATIENT)
Dept: INTERNAL MEDICINE | Age: 67
End: 2017-12-20

## 2017-12-20 VITALS
DIASTOLIC BLOOD PRESSURE: 68 MMHG | WEIGHT: 141.6 LBS | HEART RATE: 68 BPM | BODY MASS INDEX: 21.46 KG/M2 | SYSTOLIC BLOOD PRESSURE: 126 MMHG | HEIGHT: 68 IN | RESPIRATION RATE: 16 BRPM

## 2017-12-20 DIAGNOSIS — I10 ESSENTIAL HYPERTENSION: Primary | ICD-10-CM

## 2017-12-20 DIAGNOSIS — M1A.0790 IDIOPATHIC CHRONIC GOUT OF FOOT WITHOUT TOPHUS, UNSPECIFIED LATERALITY: ICD-10-CM

## 2017-12-20 DIAGNOSIS — C34.2 PRIMARY CANCER OF RIGHT MIDDLE LOBE OF LUNG (HCC): ICD-10-CM

## 2017-12-20 DIAGNOSIS — E78.2 MIXED HYPERLIPIDEMIA: ICD-10-CM

## 2017-12-20 DIAGNOSIS — E11.9 TYPE 2 DIABETES MELLITUS WITHOUT COMPLICATION, WITHOUT LONG-TERM CURRENT USE OF INSULIN (HCC): ICD-10-CM

## 2017-12-20 PROCEDURE — 99214 OFFICE O/P EST MOD 30 MIN: CPT | Performed by: INTERNAL MEDICINE

## 2017-12-20 RX ORDER — GUAIFENESIN 600 MG/1
1200 TABLET, EXTENDED RELEASE ORAL 2 TIMES DAILY
COMMUNITY
End: 2018-03-30 | Stop reason: ALTCHOICE

## 2017-12-20 ASSESSMENT — PATIENT HEALTH QUESTIONNAIRE - PHQ9
SUM OF ALL RESPONSES TO PHQ9 QUESTIONS 1 & 2: 0
2. FEELING DOWN, DEPRESSED OR HOPELESS: 0
1. LITTLE INTEREST OR PLEASURE IN DOING THINGS: 0
SUM OF ALL RESPONSES TO PHQ QUESTIONS 1-9: 0

## 2017-12-20 ASSESSMENT — ENCOUNTER SYMPTOMS
RHINORRHEA: 1
EYES NEGATIVE: 1
ALLERGIC/IMMUNOLOGIC NEGATIVE: 1
GASTROINTESTINAL NEGATIVE: 1

## 2017-12-20 NOTE — PROGRESS NOTES
three times a week. An ACE inhibitor/angiotensin II receptor blocker is being taken. Review of Systems   Constitutional: Negative for chills, diaphoresis and fatigue. Unexpected weight change: up a few #    HENT: Positive for congestion, postnasal drip and rhinorrhea. Eyes: Negative. Reading glasses   Respiratory:        DC smoking as noted with Dx of lung Ca    Cardiovascular: Negative. Negative for chest pain and palpitations. Gastrointestinal: Negative. Endocrine: Negative. Genitourinary: Positive for frequency. Musculoskeletal: Negative. Back pain: improved         Occ RLS symptoms    Skin: Negative. Allergic/Immunologic: Negative. Neurological: Negative. Negative for numbness and headaches. Hematological: Negative. Psychiatric/Behavioral: Negative. Objective:   Physical Exam   Constitutional: He is oriented to person, place, and time. He appears well-developed and well-nourished. HENT:   Head: Normocephalic and atraumatic. Right Ear: External ear normal.   Left Ear: External ear normal.   Nose: Nose normal.   Mouth/Throat: Oropharynx is clear and moist.   Dentures     Eyes: Conjunctivae and EOM are normal. Pupils are equal, round, and reactive to light. Neck: Normal range of motion. Neck supple. No tracheal deviation present. No thyromegaly present. Cardiovascular: Normal rate, regular rhythm, normal heart sounds and intact distal pulses. No murmur heard. Pulmonary/Chest: Effort normal. No respiratory distress. He has no rales. Musculoskeletal: Normal range of motion. Neurological: He is alert and oriented to person, place, and time. He has normal reflexes. Skin: Skin is warm and dry. Psychiatric: He has a normal mood and affect. His behavior is normal.       Assessment:              Plan:        Primary cancer of right middle lobe of lung ([address]  Cont Tx as noted feels good     Essential hypertension  Stable no change in meds return in 3 mo.

## 2017-12-26 ENCOUNTER — TELEPHONE (OUTPATIENT)
Dept: CARDIOTHORACIC SURGERY | Age: 67
End: 2017-12-26

## 2018-01-03 RX ORDER — LISINOPRIL 40 MG/1
TABLET ORAL
Qty: 15 TABLET | Refills: 0 | Status: SHIPPED | OUTPATIENT
Start: 2018-01-03 | End: 2018-01-24 | Stop reason: SDUPTHER

## 2018-01-04 ENCOUNTER — OFFICE VISIT (OUTPATIENT)
Dept: PULMONOLOGY | Age: 68
End: 2018-01-04

## 2018-01-04 ENCOUNTER — HOSPITAL ENCOUNTER (OUTPATIENT)
Dept: CT IMAGING | Age: 68
Discharge: HOME | End: 2018-01-04
Attending: INTERNAL MEDICINE | Admitting: INTERNAL MEDICINE

## 2018-01-04 VITALS
OXYGEN SATURATION: 98 % | BODY MASS INDEX: 21.67 KG/M2 | DIASTOLIC BLOOD PRESSURE: 100 MMHG | RESPIRATION RATE: 16 BRPM | HEART RATE: 102 BPM | WEIGHT: 143 LBS | SYSTOLIC BLOOD PRESSURE: 160 MMHG | HEIGHT: 68 IN

## 2018-01-04 DIAGNOSIS — C34.11 MALIGNANT NEOPLASM OF UPPER LOBE OF RIGHT LUNG (HCC): Primary | ICD-10-CM

## 2018-01-04 DIAGNOSIS — C34.11 MALIGNANT NEOPLASM OF UPPER LOBE OF RIGHT LUNG (HCC): ICD-10-CM

## 2018-01-04 DIAGNOSIS — C34.11 MALIGNANT NEOPLASM OF UPPER LOBE, RIGHT BRONCHUS OR LUNG (HCC): ICD-10-CM

## 2018-01-04 PROCEDURE — 99214 OFFICE O/P EST MOD 30 MIN: CPT | Performed by: INTERNAL MEDICINE

## 2018-01-04 RX ORDER — GABAPENTIN 100 MG/1
100 CAPSULE ORAL 3 TIMES DAILY
Qty: 90 CAPSULE | Refills: 0 | Status: SHIPPED | OUTPATIENT
Start: 2018-01-04 | End: 2018-03-30 | Stop reason: ALTCHOICE

## 2018-01-04 NOTE — PROGRESS NOTES
Date    Allergic rhinitis, cause unspecified 7/15/2010    Cancer ([facility] [address]     lung    Colon polyps     Diabetes mellitus ([facility] [address]     Essential hypertension, benign 7/15/2010    HYPERCHOLESTERAEMIA     Hypertension     Lipoma of other specified sites 7/15/2010    Other abnormal blood chemistry 7/15/2010    Other and unspecified hyperlipidemia 7/15/2010    Other symptoms involving cardiovascular system 7/15/2010    Psychosexual dysfunction with inhibited sexual excitement 7/15/2010       Social History:    History   Smoking Status    Former Smoker    Packs/day: 0.50    Years: 40.00    Types: Cigarettes    Start date: 7/29/1965   [facility] Quit date: 4/23/2017   Smokeless Tobacco    Never Used     Comment: To try gum or patch to start quiting classes        Current Medications:  Current Outpatient Prescriptions on File Prior to Visit   Medication Sig Dispense Refill    lisinopril (PRINIVIL;ZESTRIL) 40 MG tablet TAKE 1 TABLET BY MOUTH EVERY DAY 15 tablet 0    guaiFENesin (MUCINEX) 600 MG extended release tablet Take 1,200 mg by mouth 2 times daily      amLODIPine (NORVASC) 10 MG tablet TAKE 1 TABLET BY MOUTH EVERY DAY 90 tablet 0    vitamin D (CHOLECALCIFEROL) 1000 UNITS TABS tablet Take 2 tablets by mouth daily 30 tablet 1    aspirin 81 MG tablet Take 81 mg by mouth daily      metFORMIN (GLUCOPHAGE) 500 MG tablet Take 2 tablets by mouth 2 times daily (with meals) 360 tablet 1    PROVENTIL  (90 BASE) MCG/ACT inhaler INHALE 2 PUFFS INTO THE LUNGS EVERY 6 HOURS AS NEEDED FOR WHEEZING. 6.7 Inhaler 1    rosuvastatin (CRESTOR) 40 MG tablet TAKE 1 TABLET BY MOUTH EVERY DAY 90 tablet 2     No current facility-administered medications on file prior to visit.         REVIEW OF SYSTEMS:    CONSTITUTIONAL: Negative for fevers and chills  HEENT: Negative for oropharyngeal exudate, post nasal drip, sinus pain / pressure, nasal congestion, ear pain  RESPIRATORY:  See HPI  CARDIOVASCULAR: Negative for chest pain, palpitations, edema  GASTROINTESTINAL: Negative for nausea, vomiting, diarrhea, constipation and abdominal pain  HEMATOLOGICAL: Negative for adenopathy  SKIN: Negative for clubbing, cyanosis, skin lesions  EXTREMITIES: Negative for weakness, decreased ROM  NEUROLOGICAL: Negative for unilateral weakness, speech or gait abnormalities  PSYCH: Negative for anxiety, depression    Objective:   PHYSICAL EXAM:        VITALS:  BP (!) 160/100 (Site: Left Arm, Position: Sitting, Cuff Size: Medium Adult)   Pulse 102   Resp 16   Ht 5' 8\" (1.727 m)   Wt 143 lb (64.9 kg)   SpO2 98%   BMI 21.74 kg/m²     CONSTITUTIONAL:  Awake, alert, cooperative, no apparent distress, and appears stated age  HEENT: No oropharyngeal exudate, PERRL, no cervical adenopathy, no tracheal deviation, thyroid size normal  LUNGS:  No increased work of breathing and clear to auscultation, no crackles. No wheezing  CARDIOVASCULAR:  normal S1 and S2 and no JVD  ABDOMEN:  Normal bowel sounds, non-distended and non-tender to palpation  EXT: No edema, no calf tenderness. Pulses are present bilaterally. NEUROLOGIC:  Mental Status Exam:  Level of Alertness:   awake  Orientation:   person, place, time. SKIN:  normal skin color, texture, turgor, no redness, warmth, or swelling, with the exception of the area around his right scapula which has small areas of erythema and hyperpigmentation. DATA:      Radiology Review:  Pertinent images / reports were reviewed as a part of this visit. Chest x-ray reveals the following:     Post procedural chest film shows the small bore chest tube   extending to the lung apex with virtually complete reexpansion of   the right lung following manual evacuation of pneumothorax. Consolidated lung in the right upper chest is consistent with   atelectasis.  Trachea is midline.       Impression:       Chest tube placement with reexpansion of the right lung             Last PFTs: CONCLUSION:  Moderate obstructive defect without

## 2018-02-28 ENCOUNTER — TELEPHONE (OUTPATIENT)
Dept: PULMONOLOGY | Age: 68
End: 2018-02-28

## 2018-03-01 NOTE — TELEPHONE ENCOUNTER
Spoke to Kimberly Burton, she is aware that CT will be reviewed first before we schedule OV to come in sooner than 4/4

## 2018-03-01 NOTE — TELEPHONE ENCOUNTER
I think I should see the scan first.  If he's having arm pain and drooling we may need to image other body parts as well.

## 2018-03-01 NOTE — TELEPHONE ENCOUNTER
Spoke with pt and spouse. They are going to r/s June CT for earlier. Advised pt that I would ask Dr Scooby Bautista he would like to see pt sooner after CT scheduled.

## 2018-03-01 NOTE — TELEPHONE ENCOUNTER
Patient has moved his 6/4 CT to 3/6. Would you like him to schedule OV or would you like to review scan first?   Patient has appt scheduled 4/4 already.   Please advise  Thank you

## 2018-03-06 ENCOUNTER — HOSPITAL ENCOUNTER (OUTPATIENT)
Dept: CT IMAGING | Age: 68
Discharge: HOME | End: 2018-03-06
Attending: INTERNAL MEDICINE | Admitting: INTERNAL MEDICINE

## 2018-03-06 DIAGNOSIS — C34.11 MALIGNANT NEOPLASM OF UPPER LOBE OF RIGHT LUNG (HCC): ICD-10-CM

## 2018-03-06 DIAGNOSIS — C34.11 MALIGNANT NEOPLASM OF UPPER LOBE, RIGHT BRONCHUS OR LUNG (HCC): ICD-10-CM

## 2018-03-09 ENCOUNTER — TELEPHONE (OUTPATIENT)
Dept: PULMONOLOGY | Age: 68
End: 2018-03-09

## 2018-03-23 ENCOUNTER — HOSPITAL ENCOUNTER (OUTPATIENT)
Dept: OTHER | Age: 68
Discharge: HOME | End: 2018-03-23
Attending: INTERNAL MEDICINE | Admitting: INTERNAL MEDICINE

## 2018-03-23 DIAGNOSIS — E78.2 MIXED HYPERLIPIDEMIA: ICD-10-CM

## 2018-03-23 DIAGNOSIS — E11.9 TYPE 2 DIABETES MELLITUS WITHOUT COMPLICATION, WITHOUT LONG-TERM CURRENT USE OF INSULIN (HCC): ICD-10-CM

## 2018-03-23 DIAGNOSIS — I10 ESSENTIAL HYPERTENSION: ICD-10-CM

## 2018-03-23 DIAGNOSIS — C34.2 PRIMARY CANCER OF RIGHT MIDDLE LOBE OF LUNG (HCC): ICD-10-CM

## 2018-03-23 DIAGNOSIS — M1A.0790 IDIOPATHIC CHRONIC GOUT OF FOOT WITHOUT TOPHUS, UNSPECIFIED LATERALITY: ICD-10-CM

## 2018-03-23 LAB
A/G RATIO: 1.4 (ref 1.1–2.2)
ALBUMIN SERPL-MCNC: 4.9 G/DL (ref 3.4–5)
ALP BLD-CCNC: 160 U/L (ref 40–129)
ALT SERPL-CCNC: 17 U/L (ref 10–40)
ANION GAP SERPL CALCULATED.3IONS-SCNC: 20 MMOL/L (ref 3–16)
AST SERPL-CCNC: 17 U/L (ref 15–37)
BASOPHILS ABSOLUTE: 0.1 K/UL (ref 0–0.2)
BASOPHILS RELATIVE PERCENT: 0.9 %
BILIRUB SERPL-MCNC: 0.4 MG/DL (ref 0–1)
BUN BLDV-MCNC: 18 MG/DL (ref 7–20)
CALCIUM SERPL-MCNC: 10 MG/DL (ref 8.3–10.6)
CHLORIDE BLD-SCNC: 95 MMOL/L (ref 99–110)
CHOLESTEROL, TOTAL: 204 MG/DL (ref 0–199)
CO2: 21 MMOL/L (ref 21–32)
CREAT SERPL-MCNC: 0.8 MG/DL (ref 0.8–1.3)
EOSINOPHILS ABSOLUTE: 0.1 K/UL (ref 0–0.6)
EOSINOPHILS RELATIVE PERCENT: 2 %
ESTIMATED AVERAGE GLUCOSE: 289.1 MG/DL
GFR AFRICAN AMERICAN: >60
GFR NON-AFRICAN AMERICAN: >60
GLOBULIN: 3.4 G/DL
GLUCOSE BLD-MCNC: 348 MG/DL (ref 70–99)
HBA1C MFR BLD: 11.7 %
HCT VFR BLD CALC: 35.9 % (ref 40.5–52.5)
HDLC SERPL-MCNC: 48 MG/DL (ref 40–60)
HEMOGLOBIN: 12.6 G/DL (ref 13.5–17.5)
LDL CHOLESTEROL CALCULATED: 108 MG/DL
LYMPHOCYTES ABSOLUTE: 1.2 K/UL (ref 1–5.1)
LYMPHOCYTES RELATIVE PERCENT: 16.3 %
MCH RBC QN AUTO: 30.7 PG (ref 26–34)
MCHC RBC AUTO-ENTMCNC: 35.2 G/DL (ref 31–36)
MCV RBC AUTO: 87 FL (ref 80–100)
MONOCYTES ABSOLUTE: 0.6 K/UL (ref 0–1.3)
MONOCYTES RELATIVE PERCENT: 7.6 %
NEUTROPHILS ABSOLUTE: 5.3 K/UL (ref 1.7–7.7)
NEUTROPHILS RELATIVE PERCENT: 73.2 %
PDW BLD-RTO: 14 % (ref 12.4–15.4)
PLATELET # BLD: 219 K/UL (ref 135–450)
PMV BLD AUTO: 8.4 FL (ref 5–10.5)
POTASSIUM SERPL-SCNC: 4.7 MMOL/L (ref 3.5–5.1)
RBC # BLD: 4.12 M/UL (ref 4.2–5.9)
SODIUM BLD-SCNC: 136 MMOL/L (ref 136–145)
TOTAL PROTEIN: 8.3 G/DL (ref 6.4–8.2)
TRIGL SERPL-MCNC: 240 MG/DL (ref 0–150)
TSH REFLEX: 1.84 UIU/ML (ref 0.27–4.2)
VLDLC SERPL CALC-MCNC: 48 MG/DL
WBC # BLD: 7.3 K/UL (ref 4–11)

## 2018-03-30 ENCOUNTER — OFFICE VISIT (OUTPATIENT)
Dept: INTERNAL MEDICINE | Age: 68
End: 2018-03-30

## 2018-03-30 VITALS
HEIGHT: 68 IN | DIASTOLIC BLOOD PRESSURE: 68 MMHG | SYSTOLIC BLOOD PRESSURE: 126 MMHG | WEIGHT: 139.2 LBS | HEART RATE: 68 BPM | BODY MASS INDEX: 21.1 KG/M2 | RESPIRATION RATE: 16 BRPM

## 2018-03-30 DIAGNOSIS — E11.9 TYPE 2 DIABETES MELLITUS WITHOUT COMPLICATION, WITHOUT LONG-TERM CURRENT USE OF INSULIN (HCC): Primary | ICD-10-CM

## 2018-03-30 DIAGNOSIS — C34.2 PRIMARY CANCER OF RIGHT MIDDLE LOBE OF LUNG (HCC): ICD-10-CM

## 2018-03-30 DIAGNOSIS — I10 ESSENTIAL HYPERTENSION: ICD-10-CM

## 2018-03-30 DIAGNOSIS — E55.9 VITAMIN D DEFICIENCY: ICD-10-CM

## 2018-03-30 DIAGNOSIS — C34.91 SQUAMOUS CELL CARCINOMA OF RIGHT LUNG (HCC): ICD-10-CM

## 2018-03-30 DIAGNOSIS — E78.2 MIXED HYPERLIPIDEMIA: ICD-10-CM

## 2018-03-30 DIAGNOSIS — M1A.0790 IDIOPATHIC CHRONIC GOUT OF FOOT WITHOUT TOPHUS, UNSPECIFIED LATERALITY: ICD-10-CM

## 2018-03-30 PROCEDURE — 99214 OFFICE O/P EST MOD 30 MIN: CPT | Performed by: INTERNAL MEDICINE

## 2018-03-30 RX ORDER — LISINOPRIL 40 MG/1
TABLET ORAL
Qty: 90 TABLET | Refills: 2 | Status: SHIPPED | OUTPATIENT
Start: 2018-03-30 | End: 2019-01-09 | Stop reason: SDUPTHER

## 2018-03-30 ASSESSMENT — ENCOUNTER SYMPTOMS
EYES NEGATIVE: 1
GASTROINTESTINAL NEGATIVE: 1
BLURRED VISION: 0
RHINORRHEA: 1
ALLERGIC/IMMUNOLOGIC NEGATIVE: 1

## 2018-03-30 NOTE — PROGRESS NOTES
chronic problem. The current episode started more than 1 year ago. The problem is unchanged. The problem is controlled. Pertinent negatives include no blurred vision, chest pain, headaches or palpitations. Risk factors for coronary artery disease include diabetes mellitus, dyslipidemia and male gender. Past treatments include ACE inhibitors, calcium channel blockers and lifestyle changes. The current treatment provides significant improvement. There are no compliance problems. Current Outpatient Prescriptions   Medication Sig Dispense Refill    lisinopril (PRINIVIL;ZESTRIL) 40 MG tablet TAKE 1 TABLET BY MOUTH EVERY DAY 15 tablet 0    amLODIPine (NORVASC) 10 MG tablet TAKE 1 TABLET BY MOUTH EVERY DAY 90 tablet 0    vitamin D (CHOLECALCIFEROL) 1000 UNITS TABS tablet Take 2 tablets by mouth daily (Patient taking differently: Take 1,000 Units by mouth daily ) 30 tablet 1    aspirin 81 MG tablet Take 81 mg by mouth daily      metFORMIN (GLUCOPHAGE) 500 MG tablet Take 2 tablets by mouth 2 times daily (with meals) (Patient taking differently: Take 500 mg by mouth 2 times daily (with meals) ) 360 tablet 1    rosuvastatin (CRESTOR) 40 MG tablet TAKE 1 TABLET BY MOUTH EVERY DAY 90 tablet 2     No current facility-administered medications for this visit.         Past Medical History:   Diagnosis Date    Allergic rhinitis, cause unspecified 7/15/2010    Cancer ([facility] [address]     lung    Colon polyps     Diabetes mellitus ([facility] [address]     Essential hypertension, benign 7/15/2010    HYPERCHOLESTERAEMIA     Hypertension     Lipoma of other specified sites 7/15/2010    Other abnormal blood chemistry 7/15/2010    Other and unspecified hyperlipidemia 7/15/2010    Other symptoms involving cardiovascular system 7/15/2010    Psychosexual dysfunction with inhibited sexual excitement 7/15/2010       Family History   Problem Relation Age of Onset    Breast Cancer Mother        Past Surgical History:   Procedure Laterality Date   

## 2018-03-30 NOTE — ASSESSMENT & PLAN NOTE
S/P rads x5 then Chemo x 6 and then surgery  5/2017 then cont Chemo as per Dr Uzair Hood and Dr Hubert Rainey  Last CT 3/2018 was stable no new findings

## 2018-03-30 NOTE — ASSESSMENT & PLAN NOTE
Out of control not tolerating 4 metformin /day can take 500 BID will add Januvia start at 50 mg then up 100mg

## 2018-04-04 ENCOUNTER — OFFICE VISIT (OUTPATIENT)
Dept: PULMONOLOGY | Age: 68
End: 2018-04-04

## 2018-04-04 VITALS
SYSTOLIC BLOOD PRESSURE: 136 MMHG | HEART RATE: 117 BPM | BODY MASS INDEX: 20.76 KG/M2 | OXYGEN SATURATION: 95 % | RESPIRATION RATE: 16 BRPM | WEIGHT: 137 LBS | HEIGHT: 68 IN | DIASTOLIC BLOOD PRESSURE: 72 MMHG

## 2018-04-04 DIAGNOSIS — C34.11 MALIGNANT NEOPLASM OF UPPER LOBE OF RIGHT LUNG (HCC): Primary | ICD-10-CM

## 2018-04-04 DIAGNOSIS — J44.9 COPD, MODERATE (HCC): ICD-10-CM

## 2018-04-04 PROCEDURE — 99213 OFFICE O/P EST LOW 20 MIN: CPT | Performed by: INTERNAL MEDICINE

## 2018-04-12 DIAGNOSIS — R93.1 ABNORMAL ECHOCARDIOGRAM: Primary | ICD-10-CM

## 2018-04-12 PROBLEM — R73.9 HYPERGLYCEMIA WITHOUT KETOSIS: Status: ACTIVE | Noted: 2018-04-12

## 2018-04-12 PROBLEM — N17.9 AKI (ACUTE KIDNEY INJURY) (HCC): Status: ACTIVE | Noted: 2018-04-12

## 2018-04-13 DIAGNOSIS — R93.1 ABNORMAL ECHOCARDIOGRAM: Primary | ICD-10-CM

## 2018-04-14 ENCOUNTER — CARE COORDINATION (OUTPATIENT)
Dept: CASE MANAGEMENT | Age: 68
End: 2018-04-14

## 2018-04-14 DIAGNOSIS — R07.9 CHEST PAIN, UNSPECIFIED TYPE: Primary | ICD-10-CM

## 2018-04-14 PROCEDURE — 1111F DSCHRG MED/CURRENT MED MERGE: CPT | Performed by: INTERNAL MEDICINE

## 2018-04-19 ENCOUNTER — CARE COORDINATION (OUTPATIENT)
Dept: CASE MANAGEMENT | Age: 68
End: 2018-04-19

## 2018-04-27 ENCOUNTER — CARE COORDINATION (OUTPATIENT)
Dept: CASE MANAGEMENT | Age: 68
End: 2018-04-27

## 2018-06-29 ENCOUNTER — TELEPHONE (OUTPATIENT)
Dept: PULMONOLOGY | Age: 68
End: 2018-06-29

## 2018-07-02 ENCOUNTER — HOSPITAL ENCOUNTER (OUTPATIENT)
Dept: OTHER | Age: 68
Discharge: HOME | End: 2018-07-02
Attending: INTERNAL MEDICINE | Admitting: INTERNAL MEDICINE

## 2018-07-02 LAB
A/G RATIO: 1.4 (ref 1.1–2.2)
ALBUMIN SERPL-MCNC: 4.8 G/DL (ref 3.4–5)
ALP BLD-CCNC: 146 U/L (ref 40–129)
ALT SERPL-CCNC: 15 U/L (ref 10–40)
ANION GAP SERPL CALCULATED.3IONS-SCNC: 18 MMOL/L (ref 3–16)
AST SERPL-CCNC: 19 U/L (ref 15–37)
BASOPHILS ABSOLUTE: 0 K/UL (ref 0–0.2)
BASOPHILS RELATIVE PERCENT: 0.7 %
BILIRUB SERPL-MCNC: 0.3 MG/DL (ref 0–1)
BUN BLDV-MCNC: 19 MG/DL (ref 7–20)
CALCIUM SERPL-MCNC: 10.5 MG/DL (ref 8.3–10.6)
CHLORIDE BLD-SCNC: 99 MMOL/L (ref 99–110)
CHOLESTEROL, TOTAL: 223 MG/DL (ref 0–199)
CO2: 22 MMOL/L (ref 21–32)
CREAT SERPL-MCNC: 0.9 MG/DL (ref 0.8–1.3)
EOSINOPHILS ABSOLUTE: 0.1 K/UL (ref 0–0.6)
EOSINOPHILS RELATIVE PERCENT: 0.9 %
GFR AFRICAN AMERICAN: >60
GFR NON-AFRICAN AMERICAN: >60
GLOBULIN: 3.4 G/DL
GLUCOSE BLD-MCNC: 132 MG/DL (ref 70–99)
HCT VFR BLD CALC: 35.5 % (ref 40.5–52.5)
HDLC SERPL-MCNC: 44 MG/DL (ref 40–60)
HEMOGLOBIN: 12.2 G/DL (ref 13.5–17.5)
LDL CHOLESTEROL CALCULATED: 140 MG/DL
LYMPHOCYTES ABSOLUTE: 1.4 K/UL (ref 1–5.1)
LYMPHOCYTES RELATIVE PERCENT: 19.8 %
MCH RBC QN AUTO: 30.9 PG (ref 26–34)
MCHC RBC AUTO-ENTMCNC: 34.3 G/DL (ref 31–36)
MCV RBC AUTO: 90 FL (ref 80–100)
MONOCYTES ABSOLUTE: 0.4 K/UL (ref 0–1.3)
MONOCYTES RELATIVE PERCENT: 6.2 %
NEUTROPHILS ABSOLUTE: 4.9 K/UL (ref 1.7–7.7)
NEUTROPHILS RELATIVE PERCENT: 72.4 %
PDW BLD-RTO: 13.7 % (ref 12.4–15.4)
PLATELET # BLD: 227 K/UL (ref 135–450)
PMV BLD AUTO: 8.1 FL (ref 5–10.5)
POTASSIUM SERPL-SCNC: 4.6 MMOL/L (ref 3.5–5.1)
RBC # BLD: 3.94 M/UL (ref 4.2–5.9)
SODIUM BLD-SCNC: 139 MMOL/L (ref 136–145)
TOTAL PROTEIN: 8.2 G/DL (ref 6.4–8.2)
TRIGL SERPL-MCNC: 194 MG/DL (ref 0–150)
VLDLC SERPL CALC-MCNC: 39 MG/DL
WBC # BLD: 6.8 K/UL (ref 4–11)

## 2018-07-03 ENCOUNTER — TELEPHONE (OUTPATIENT)
Dept: CARDIOLOGY CLINIC | Age: 68
End: 2018-07-03

## 2018-07-03 LAB
ESTIMATED AVERAGE GLUCOSE: 226 MG/DL
HBA1C MFR BLD: 9.5 %

## 2018-07-09 ENCOUNTER — OFFICE VISIT (OUTPATIENT)
Dept: INTERNAL MEDICINE | Age: 68
End: 2018-07-09

## 2018-07-09 VITALS
SYSTOLIC BLOOD PRESSURE: 110 MMHG | DIASTOLIC BLOOD PRESSURE: 70 MMHG | WEIGHT: 131.2 LBS | BODY MASS INDEX: 19.88 KG/M2 | HEIGHT: 68 IN | HEART RATE: 66 BPM | RESPIRATION RATE: 16 BRPM

## 2018-07-09 DIAGNOSIS — C34.91 SQUAMOUS CELL CARCINOMA OF RIGHT LUNG (HCC): ICD-10-CM

## 2018-07-09 DIAGNOSIS — J30.2 CHRONIC SEASONAL ALLERGIC RHINITIS, UNSPECIFIED TRIGGER: Chronic | ICD-10-CM

## 2018-07-09 DIAGNOSIS — E11.9 TYPE 2 DIABETES MELLITUS WITHOUT COMPLICATION, WITHOUT LONG-TERM CURRENT USE OF INSULIN (HCC): Primary | ICD-10-CM

## 2018-07-09 DIAGNOSIS — E78.5 DYSLIPIDEMIA: ICD-10-CM

## 2018-07-09 DIAGNOSIS — E78.2 MIXED HYPERLIPIDEMIA: ICD-10-CM

## 2018-07-09 DIAGNOSIS — Z12.11 ENCOUNTER FOR SCREENING COLONOSCOPY: ICD-10-CM

## 2018-07-09 DIAGNOSIS — J44.9 COPD, MODERATE (HCC): ICD-10-CM

## 2018-07-09 DIAGNOSIS — I10 ESSENTIAL HYPERTENSION: ICD-10-CM

## 2018-07-09 PROBLEM — N17.9 AKI (ACUTE KIDNEY INJURY) (HCC): Status: RESOLVED | Noted: 2018-04-12 | Resolved: 2018-07-09

## 2018-07-09 PROCEDURE — 99214 OFFICE O/P EST MOD 30 MIN: CPT | Performed by: INTERNAL MEDICINE

## 2018-07-09 ASSESSMENT — ENCOUNTER SYMPTOMS
GASTROINTESTINAL NEGATIVE: 1
EYES NEGATIVE: 1
ALLERGIC/IMMUNOLOGIC NEGATIVE: 1
RHINORRHEA: 1

## 2018-07-09 NOTE — PROGRESS NOTES
treatment all of the time. His weight is fluctuating minimally. He is following a diabetic, low fat/cholesterol and low salt diet. He has had a previous visit with a dietitian. He participates in exercise intermittently. His home blood glucose trend is decreasing steadily. His breakfast blood glucose is taken between 7-8 am. His breakfast blood glucose range is generally 180-200 mg/dl. An ACE inhibitor/angiotensin II receptor blocker is being taken. Eye exam is current. No Known Allergies    Current Outpatient Prescriptions   Medication Sig Dispense Refill    vitamin D (CHOLECALCIFEROL) 1000 UNIT TABS tablet Take 2,000 Units by mouth daily      metFORMIN (GLUCOPHAGE) 500 MG tablet TAKE 2 TABLETS BY MOUTH 2 TIMES DAILY (WITH MEALS) 360 tablet 0    insulin glargine (LANTUS) 100 UNIT/ML injection pen Inject 20 Units into the skin nightly 5 pen 3    lisinopril (PRINIVIL;ZESTRIL) 40 MG tablet TAKE 1 TABLET BY MOUTH EVERY DAY 90 tablet 2    amLODIPine (NORVASC) 10 MG tablet TAKE 1 TABLET BY MOUTH EVERY DAY 90 tablet 0    aspirin 81 MG tablet Take 81 mg by mouth daily      rosuvastatin (CRESTOR) 40 MG tablet TAKE 1 TABLET BY MOUTH EVERY DAY 90 tablet 2     No current facility-administered medications for this visit.         Past Medical History:   Diagnosis Date    Allergic rhinitis, cause unspecified 7/15/2010    Cancer ([facility] [address]     lung    Colon polyps     Diabetes mellitus ([facility] [address]     Essential hypertension, benign 7/15/2010    HYPERCHOLESTERAEMIA     Hypertension     Lipoma of other specified sites 7/15/2010    Other abnormal blood chemistry 7/15/2010    Other and unspecified hyperlipidemia 7/15/2010    Other symptoms involving cardiovascular system 7/15/2010    Psychosexual dysfunction with inhibited sexual excitement 7/15/2010       Family History   Problem Relation Age of Onset    Breast Cancer Mother        Past Surgical History:   Procedure Laterality Date    BRONCHOSCOPY Right 04/27/2017 and atraumatic. Nose: Nose normal.   Mouth/Throat: Oropharynx is clear and moist.   Dentures     Eyes: Conjunctivae and EOM are normal. Pupils are equal, round, and reactive to light. Neck: Normal range of motion. Neck supple. No tracheal deviation present. No thyromegaly present. Cardiovascular: Normal rate, regular rhythm, normal heart sounds and intact distal pulses. No murmur heard. Pulmonary/Chest: Effort normal. No respiratory distress. He has no rales. Musculoskeletal: Normal range of motion. Neurological: He is alert and oriented to person, place, and time. He has normal reflexes. Skin: Skin is warm and dry. Psychiatric: He has a normal mood and affect. His behavior is normal.       /70 (Site: Right Arm, Position: Sitting, Cuff Size: Medium Adult)   Pulse 66   Resp 16   Ht 5' 8\" (1.727 m)   Wt 131 lb 3.2 oz (59.5 kg)   BMI 19.95 kg/m²       Assessment & Plan:          Allergic rhinitis  Prn meds     COPD, moderate (HCC)  Stable for now s/p surgery -chemo and rads     Dyslipidemia  Stable continue current meds and return in 3 mo. Essential hypertension  Stable continue current meds and return in 3 mo. Type 2 diabetes mellitus without complication, without long-term current use of insulin (HCC)  Still out of control but improved a little cont to adjust Lantis as instruced has not done so recently instructed again     Squamous cell lung cancer ([facility] [address]  Remains stable  Cont routine F/U     Mixed hyperlipidemia  Stable continue current meds and return in 3 mo.

## 2018-07-09 NOTE — ASSESSMENT & PLAN NOTE
Still out of control but improved a little cont to adjust Lantis as instruced has not done so recently instructed again

## 2018-07-11 ENCOUNTER — TELEPHONE (OUTPATIENT)
Dept: PULMONOLOGY | Age: 68
End: 2018-07-11

## 2018-07-11 DIAGNOSIS — C34.11 MALIGNANT NEOPLASM OF UPPER LOBE OF RIGHT LUNG (HCC): Primary | ICD-10-CM

## 2018-07-13 ENCOUNTER — HOSPITAL ENCOUNTER (OUTPATIENT)
Dept: OTHER | Age: 68
Discharge: HOME | End: 2018-07-14
Attending: INTERNAL MEDICINE | Admitting: INTERNAL MEDICINE

## 2018-07-13 DIAGNOSIS — C34.11 MALIGNANT NEOPLASM OF UPPER LOBE OF RIGHT LUNG (HCC): ICD-10-CM

## 2018-07-16 ENCOUNTER — TELEPHONE (OUTPATIENT)
Dept: PHARMACY | Facility: CLINIC | Age: 68
End: 2018-07-16

## 2018-07-16 NOTE — TELEPHONE ENCOUNTER
CLINICAL PHARMACY: ADHERENCE REVIEW    Identified care gap per Cruger: lisinopril adherence  Per records, appears 90-day supply last filled on 03/30/2018. Per Reconcile Medication Dispenses in Care Path:  - lisinopril 40 mg tab, 1 daily last filled on 7/11/18 for a 90-day supply   · Previous fill history: 3/30/18 x 90 ds, 1/25/18 x 15 ds, 1/3/18 x 15 ds, 12/18/17 x 15 ds  · Appears adherent/up to date with refill based on above fill history; has 90 day Rx    No patient out reach planned at this time.     Harsh Lindo, PharmD, [address] Pharmacist  Direct: [phone]  Dept: [phone] (toll free [phone], option 7)

## 2018-07-23 ENCOUNTER — OFFICE VISIT (OUTPATIENT)
Dept: PULMONOLOGY | Age: 68
End: 2018-07-23

## 2018-07-23 VITALS
DIASTOLIC BLOOD PRESSURE: 70 MMHG | HEART RATE: 124 BPM | HEIGHT: 68 IN | SYSTOLIC BLOOD PRESSURE: 130 MMHG | BODY MASS INDEX: 19.55 KG/M2 | OXYGEN SATURATION: 99 % | WEIGHT: 129 LBS

## 2018-07-23 DIAGNOSIS — C34.11 MALIGNANT NEOPLASM OF UPPER LOBE OF RIGHT LUNG (HCC): Primary | ICD-10-CM

## 2018-07-23 DIAGNOSIS — J44.9 COPD, MODERATE (HCC): ICD-10-CM

## 2018-07-23 PROCEDURE — 99213 OFFICE O/P EST LOW 20 MIN: CPT | Performed by: INTERNAL MEDICINE

## 2018-07-23 NOTE — PROGRESS NOTES
[facility]    Outpatient Follow Up Note    Subjective:   CHIEF COMPLAINT / HPI:     The patient is 69 y.o. male who presents today for COPD, and lung cancer. Sang Yusuf is doing pretty well. He's still active mowing the lawn and not needing inhalers. He's paranoid that his cancer is coming back, but who can blame him as he's now developed diabetes and is on lantis and metformin. Previous history: Since last time I saw Mr. Corrinne Shiver in the office I performed a bronchoscopy with intent to do ebus however he had complete obstruction of his right upper lobe bronchus required debulking and biopsy and eventually stenting of the airway by Dr. Lorie Ortega. Patient's lung biopsies came back with squamous cell carcinoma. Following the stent placement which blocks off his right upper lobe Mr. Corrinne Shiver developed an asymptomatic right sided pneumothorax with complete collapse of the right lung. He was admitted to the hospital and we placed a small bore chest tube which evacuated the pneumothorax and then the chest tube fell out a day and a half later without resolution of the pneumothorax is in the hospital.  However, when he went to his next CT simulation for radiation oncology 4 days later the pneumothorax had recurred. Instead of admitting him this time because he was again asymptomatic, I sent him to interventional radiology where a new small bore chest tube was placed and attached to a pneumo stat. He's had a small chest tube in for 2 weeks without recurrence of the pneumothorax thus far. He's feeling pretty well today and had radiation therapy earlier today. He's concerned because the tubing on his chest tube is getting longer and he scraped the chest tube is coming out. He does not want to have a third one placed.      Past Medical History:    Past Medical History:   Diagnosis Date    Allergic rhinitis, cause unspecified 7/15/2010    Cancer ([facility] [address]     lung    Colon polyps     Diabetes decreased ROM  NEUROLOGICAL: Negative for unilateral weakness, speech or gait abnormalities  PSYCH: Negative for anxiety, depression    Objective:   PHYSICAL EXAM:        VITALS:  /70 (Site: Left Arm, Position: Sitting, Cuff Size: Small Adult)   Pulse 124   Ht 5' 8\" (1.727 m)   Wt 129 lb (58.5 kg)   SpO2 99%   BMI 19.61 kg/m²     CONSTITUTIONAL:  Awake, alert, cooperative, no apparent distress, and appears stated age  HEENT: No oropharyngeal exudate, PERRL, no cervical adenopathy, no tracheal deviation, thyroid size normal  LUNGS:  No increased work of breathing and clear to auscultation, no crackles. No wheezing. Transmitted breath sounds in RUL  CARDIOVASCULAR:  normal S1 and S2 and no JVD  ABDOMEN:  Normal bowel sounds, non-distended and non-tender to palpation  EXT: No edema, no calf tenderness. Pulses are present bilaterally. NEUROLOGIC:  Mental Status Exam:  Level of Alertness:   awake  Orientation:   person, place, time. SKIN:  normal skin color, texture, turgor, no redness, warmth, or swelling, with the exception of the area around his right scapula which has small areas of erythema and hyperpigmentation. DATA:      Radiology Review:  Pertinent images / reports were reviewed as a part of this visit. Chest x-ray reveals the following:     Post procedural chest film shows the small bore chest tube   extending to the lung apex with virtually complete reexpansion of   the right lung following manual evacuation of pneumothorax. Consolidated lung in the right upper chest is consistent with   atelectasis. Trachea is midline.       Impression:       Chest tube placement with reexpansion of the right lung         3/18  No change since 1/24/2018, with extensive atelectasis in the right   lung and soft tissue thickening in the right hilar region. Last PFTs: CONCLUSION:  Moderate obstructive defect without bronchodilator  response. Air trapping present and moderate decrease in diffusion. These findings are most consistent with the diagnosis of COPD. Assessment: This is a 69 y.o. male with squamous cell lung cancer with obstruction of the right mainstem bronchus status post stent placement in the bronchus intermedius and removal now s/p chemo and radiation    Plan:     COPD remains well managed without controller medications. Has a rescue inhaler. No evidence of recurrence of cancer on his surveillance CT. Repeat CT in OCtober for 6 month follow up. Doesn't currently have follow up with rad/onc or oncology. The patient is not currently smoking. Recommend maintaining a smoke-free lifestyle. More than half the time of this 20 minute visit was spent counseling the patient. RTC 3 months w/ MD. Call or RTC sooner if symptoms persist or worsen acutely.       Rey Zavaleta

## 2018-08-03 ENCOUNTER — TELEPHONE (OUTPATIENT)
Dept: PHARMACY | Facility: CLINIC | Age: 68
End: 2018-08-03

## 2018-08-03 NOTE — TELEPHONE ENCOUNTER
CLINICAL PHARMACY CONSULT: MEDICATION RECONCILIATION/REVIEW    Aron Akins is a 69 y.o. male referred to clinical pharmacist for Medication Review. SUBJECTIVE:   Identified as DM care gap for [place]: statin therapy. OBJECTIVE:  No Known Allergies    Medications per current medication list:  Medication Sig Dispense Refill    rosuvastatin (CRESTOR) 40 MG tablet TAKE 1 TABLET BY MOUTH EVERY DAY 90 tablet 2     Labs:  Lab Results   Component Value Date    CHOL 223 (H) 07/02/2018    CHOL 204 (H) 03/23/2018    CHOL 202 (H) 03/28/2017    CHOL 153 03/28/2017     Lab Results   Component Value Date    TRIG 194 (H) 07/02/2018    TRIG 240 (H) 03/23/2018    TRIG 141 03/28/2017     Lab Results   Component Value Date    HDL 44 07/02/2018    HDL 48 03/23/2018    HDL 49 03/28/2017     Lab Results   Component Value Date    LDLCALC 140 (H) 07/02/2018    LDLCALC 108 (H) 03/23/2018    LDLCALC 125 03/28/2017     Lab Results   Component Value Date    LABVLDL 39 07/02/2018    LABVLDL 48 03/23/2018     Lab Results   Component Value Date    CHOLHDLRATIO 4.1 03/28/2017    CHOLHDLRATIO 3.5 10/25/2016    CHOLHDLRATIO 7.4 (H) 03/07/2016     Lab Results   Component Value Date    ALT 15 07/02/2018        The 10-year ASCVD risk score (Alyssa Ta et al., 2013) is: 38.7%    Values used to calculate the score:      Age: 69 years      Sex: Male      Is Non- : No      Diabetic: Yes      Tobacco smoker: No      Systolic Blood Pressure: 516 mmHg      Is BP treated: Yes      HDL Cholesterol: 44 mg/dL      Total Cholesterol: 223 mg/dL      ASSESSMENT:  Hyperlipidemia Goal: Patient has a 10-yr ASCVD risk of >7.5% with DM and is therefore a candidate for high-intensity statin therapy based on updated guidelines. Patient is prescribed high-intensity statin therapy. Patient's most recent ALT is 15. PLAN:  - Medications:  Per [facility] pharmacy, patient has never filled Rosuvastatin at their pharmacy.    Attempted to call

## 2018-08-07 PROBLEM — R93.1 ABNORMAL ECHOCARDIOGRAM: Status: ACTIVE | Noted: 2018-08-07

## 2018-08-08 ENCOUNTER — HOSPITAL ENCOUNTER (OUTPATIENT)
Dept: NON INVASIVE DIAGNOSTICS | Age: 68
Discharge: HOME | End: 2018-08-08
Attending: INTERNAL MEDICINE | Admitting: INTERNAL MEDICINE

## 2018-08-08 ENCOUNTER — OFFICE VISIT (OUTPATIENT)
Dept: CARDIOLOGY CLINIC | Age: 68
End: 2018-08-08

## 2018-08-08 VITALS
SYSTOLIC BLOOD PRESSURE: 170 MMHG | HEIGHT: 68 IN | HEART RATE: 120 BPM | WEIGHT: 125.8 LBS | DIASTOLIC BLOOD PRESSURE: 88 MMHG | OXYGEN SATURATION: 95 % | BODY MASS INDEX: 19.07 KG/M2

## 2018-08-08 DIAGNOSIS — E78.2 MIXED HYPERLIPIDEMIA: ICD-10-CM

## 2018-08-08 DIAGNOSIS — R93.1 ABNORMAL FINDINGS ON DIAGNOSTIC IMAGING OF HEART AND CORONARY CIRCULATION: ICD-10-CM

## 2018-08-08 DIAGNOSIS — R93.1 ABNORMAL ECHOCARDIOGRAM: Primary | ICD-10-CM

## 2018-08-08 DIAGNOSIS — I10 ESSENTIAL HYPERTENSION: ICD-10-CM

## 2018-08-08 DIAGNOSIS — R07.9 CHEST PAIN, UNSPECIFIED TYPE: ICD-10-CM

## 2018-08-08 DIAGNOSIS — R93.1 ABNORMAL ECHOCARDIOGRAM: ICD-10-CM

## 2018-08-08 PROCEDURE — 99214 OFFICE O/P EST MOD 30 MIN: CPT | Performed by: INTERNAL MEDICINE

## 2018-08-08 PROCEDURE — 93000 ELECTROCARDIOGRAM COMPLETE: CPT | Performed by: INTERNAL MEDICINE

## 2018-08-17 ENCOUNTER — TELEPHONE (OUTPATIENT)
Dept: INTERNAL MEDICINE | Age: 68
End: 2018-08-17

## 2018-08-22 NOTE — TELEPHONE ENCOUNTER
Rhoda Ingram,     Per claims data, Crestor 40 mg was last filled 3/11/2016. When [facility], they could not find any recent fills of Crestor. Could not reach patient earlier this month. Please contact patient to see if he is still taking Crestor 40 mg daily and where he fills the prescription.       Thank you,    Elis Cat, PharmD  [address] [place] [address] Pharmacist  [phone] or [phone] (Option 7)

## 2018-08-22 NOTE — TELEPHONE ENCOUNTER
Attempting to contact patient at home/cell number. Left message on TAD requesting a call back at [phone] Option #7.

## 2018-08-23 NOTE — TELEPHONE ENCOUNTER
Patient returned previous outreach. Patient advised that he was receiving chemo/radiation for a year and 3 months and could not take a lot of his medications - Crestor included - during this time. Per patient he was having trouble swallowing and when he was able to take the medication he felt nauseous. Patient has now completed all of his chemo/radiation and will start to take his Crestor once again. He still fills at the [facility].    Patient has an appointment with his Provider: Dr. Patric Lopez on 9/07/18 and will discuss with him about re-ordering his Crestor.

## 2018-09-06 ENCOUNTER — HOSPITAL ENCOUNTER (OUTPATIENT)
Dept: OTHER | Age: 68
Discharge: HOME | End: 2018-09-06
Attending: INTERNAL MEDICINE | Admitting: INTERNAL MEDICINE

## 2018-09-06 DIAGNOSIS — J44.9 COPD, MODERATE (HCC): ICD-10-CM

## 2018-09-06 DIAGNOSIS — E11.9 TYPE 2 DIABETES MELLITUS WITHOUT COMPLICATION, WITHOUT LONG-TERM CURRENT USE OF INSULIN (HCC): ICD-10-CM

## 2018-09-06 DIAGNOSIS — J30.2 CHRONIC SEASONAL ALLERGIC RHINITIS: Chronic | ICD-10-CM

## 2018-09-06 DIAGNOSIS — E78.2 MIXED HYPERLIPIDEMIA: ICD-10-CM

## 2018-09-06 DIAGNOSIS — E78.5 DYSLIPIDEMIA: ICD-10-CM

## 2018-09-06 DIAGNOSIS — I10 ESSENTIAL HYPERTENSION: ICD-10-CM

## 2018-09-06 DIAGNOSIS — C34.91 SQUAMOUS CELL CARCINOMA OF RIGHT LUNG (HCC): ICD-10-CM

## 2018-09-06 LAB
A/G RATIO: 1.4 (ref 1.1–2.2)
ALBUMIN SERPL-MCNC: 4.5 G/DL (ref 3.4–5)
ALP BLD-CCNC: 131 U/L (ref 40–129)
ALT SERPL-CCNC: 12 U/L (ref 10–40)
ANION GAP SERPL CALCULATED.3IONS-SCNC: 15 MMOL/L (ref 3–16)
AST SERPL-CCNC: 16 U/L (ref 15–37)
BASOPHILS ABSOLUTE: 0.1 K/UL (ref 0–0.2)
BASOPHILS RELATIVE PERCENT: 0.9 %
BILIRUB SERPL-MCNC: <0.2 MG/DL (ref 0–1)
BUN BLDV-MCNC: 12 MG/DL (ref 7–20)
CALCIUM SERPL-MCNC: 9.6 MG/DL (ref 8.3–10.6)
CHLORIDE BLD-SCNC: 102 MMOL/L (ref 99–110)
CHOLESTEROL, TOTAL: 124 MG/DL (ref 0–199)
CO2: 22 MMOL/L (ref 21–32)
CREAT SERPL-MCNC: 0.8 MG/DL (ref 0.8–1.3)
EOSINOPHILS ABSOLUTE: 0.2 K/UL (ref 0–0.6)
EOSINOPHILS RELATIVE PERCENT: 3.1 %
ESTIMATED AVERAGE GLUCOSE: 217.3 MG/DL
GFR AFRICAN AMERICAN: >60
GFR NON-AFRICAN AMERICAN: >60
GLOBULIN: 3.3 G/DL
GLUCOSE BLD-MCNC: 212 MG/DL (ref 70–99)
HBA1C MFR BLD: 9.2 %
HCT VFR BLD CALC: 34.8 % (ref 40.5–52.5)
HDLC SERPL-MCNC: 45 MG/DL (ref 40–60)
HEMOGLOBIN: 11.6 G/DL (ref 13.5–17.5)
LDL CHOLESTEROL CALCULATED: 54 MG/DL
LYMPHOCYTES ABSOLUTE: 1 K/UL (ref 1–5.1)
LYMPHOCYTES RELATIVE PERCENT: 15.9 %
MCH RBC QN AUTO: 30.6 PG (ref 26–34)
MCHC RBC AUTO-ENTMCNC: 33.2 G/DL (ref 31–36)
MCV RBC AUTO: 91.9 FL (ref 80–100)
MONOCYTES ABSOLUTE: 0.5 K/UL (ref 0–1.3)
MONOCYTES RELATIVE PERCENT: 8 %
NEUTROPHILS ABSOLUTE: 4.6 K/UL (ref 1.7–7.7)
NEUTROPHILS RELATIVE PERCENT: 72.1 %
PDW BLD-RTO: 14.1 % (ref 12.4–15.4)
PLATELET # BLD: 232 K/UL (ref 135–450)
PMV BLD AUTO: 8.4 FL (ref 5–10.5)
POTASSIUM SERPL-SCNC: 4.3 MMOL/L (ref 3.5–5.1)
RBC # BLD: 3.79 M/UL (ref 4.2–5.9)
SODIUM BLD-SCNC: 139 MMOL/L (ref 136–145)
TOTAL PROTEIN: 7.8 G/DL (ref 6.4–8.2)
TRIGL SERPL-MCNC: 126 MG/DL (ref 0–150)
TSH REFLEX: 1.93 UIU/ML (ref 0.27–4.2)
VLDLC SERPL CALC-MCNC: 25 MG/DL
WBC # BLD: 6.4 K/UL (ref 4–11)

## 2018-09-07 ENCOUNTER — OFFICE VISIT (OUTPATIENT)
Dept: INTERNAL MEDICINE | Age: 68
End: 2018-09-07

## 2018-09-07 VITALS
HEIGHT: 68 IN | WEIGHT: 130 LBS | SYSTOLIC BLOOD PRESSURE: 124 MMHG | DIASTOLIC BLOOD PRESSURE: 84 MMHG | BODY MASS INDEX: 19.7 KG/M2 | HEART RATE: 86 BPM | OXYGEN SATURATION: 98 %

## 2018-09-07 DIAGNOSIS — C34.01 MALIGNANT NEOPLASM OF RIGHT MAIN BRONCHUS (HCC): ICD-10-CM

## 2018-09-07 DIAGNOSIS — G25.81 RLS (RESTLESS LEGS SYNDROME): ICD-10-CM

## 2018-09-07 DIAGNOSIS — J30.2 SEASONAL ALLERGIC RHINITIS, UNSPECIFIED TRIGGER: Chronic | ICD-10-CM

## 2018-09-07 DIAGNOSIS — J44.9 COPD, MODERATE (HCC): Primary | ICD-10-CM

## 2018-09-07 DIAGNOSIS — Z12.11 ENCOUNTER FOR SCREENING COLONOSCOPY: ICD-10-CM

## 2018-09-07 DIAGNOSIS — M1A.0790 IDIOPATHIC CHRONIC GOUT OF FOOT WITHOUT TOPHUS, UNSPECIFIED LATERALITY: ICD-10-CM

## 2018-09-07 DIAGNOSIS — E11.9 TYPE 2 DIABETES MELLITUS WITHOUT COMPLICATION, WITHOUT LONG-TERM CURRENT USE OF INSULIN (HCC): ICD-10-CM

## 2018-09-07 DIAGNOSIS — E78.2 MIXED HYPERLIPIDEMIA: ICD-10-CM

## 2018-09-07 DIAGNOSIS — E55.9 VITAMIN D DEFICIENCY: ICD-10-CM

## 2018-09-07 PROCEDURE — 99214 OFFICE O/P EST MOD 30 MIN: CPT | Performed by: INTERNAL MEDICINE

## 2018-09-07 ASSESSMENT — ENCOUNTER SYMPTOMS
RHINORRHEA: 1
ALLERGIC/IMMUNOLOGIC NEGATIVE: 1
GASTROINTESTINAL NEGATIVE: 1
EYES NEGATIVE: 1

## 2018-09-07 ASSESSMENT — PATIENT HEALTH QUESTIONNAIRE - PHQ9
SUM OF ALL RESPONSES TO PHQ9 QUESTIONS 1 & 2: 0
SUM OF ALL RESPONSES TO PHQ QUESTIONS 1-9: 0
2. FEELING DOWN, DEPRESSED OR HOPELESS: 0
1. LITTLE INTEREST OR PLEASURE IN DOING THINGS: 0
SUM OF ALL RESPONSES TO PHQ QUESTIONS 1-9: 0

## 2018-09-07 NOTE — PROGRESS NOTES
Oropharynx is clear and moist.   Dentures     Eyes: Pupils are equal, round, and reactive to light. Conjunctivae and EOM are normal.   Neck: Normal range of motion. Neck supple. No tracheal deviation present. No thyromegaly present. Cardiovascular: Normal rate, regular rhythm, normal heart sounds and intact distal pulses. No murmur heard. Pulmonary/Chest: Effort normal. No respiratory distress. He has no rales. Musculoskeletal: Normal range of motion. Neurological: He is alert and oriented to person, place, and time. He has normal reflexes. Skin: Skin is warm and dry. Psychiatric: He has a normal mood and affect. His behavior is normal.       /84 (Site: Right Upper Arm, Position: Sitting, Cuff Size: Medium Adult)   Pulse 86 Comment: pulse ox  Ht 5' 8\" (1.727 m)   Wt 130 lb (59 kg)   SpO2 98%   BMI 19.77 kg/m²         Assessment & Plan:        Allergic rhinitis  Cont with prn meds     COPD, moderate (HCC)  Stable for now     Primary cancer of right middle lobe of lung (HCC)  Remains stable     Lung cancer, main bronchus ([address]  Repeat CT 10/18    RLS (restless legs syndrome)  occ symptoms     Type 2 diabetes mellitus without complication, without long-term current use of insulin (HCC)  Slightly improved but needs to cont to adjust [place] will cont other meds and diet     Mixed hyperlipidemia  Stable continue current meds and return in 3 mo.         Gout  No recent symptoms     Vitamin D deficiency  Continue current meds

## 2018-09-26 PROBLEM — R05.9 COUGH: Status: RESOLVED | Noted: 2017-03-31 | Resolved: 2018-09-26

## 2018-09-26 PROBLEM — Z51.11 ENCOUNTER FOR ANTINEOPLASTIC CHEMOTHERAPY: Status: RESOLVED | Noted: 2017-05-18 | Resolved: 2018-09-26

## 2018-09-26 RX ORDER — ROSUVASTATIN CALCIUM 40 MG/1
40 TABLET, COATED ORAL EVERY EVENING
Qty: 90 TABLET | Refills: 3 | Status: SHIPPED | OUTPATIENT
Start: 2018-09-26 | End: 2019-03-25 | Stop reason: SDUPTHER

## 2018-09-26 NOTE — TELEPHONE ENCOUNTER
Walter Ryder MD - please see pharmacist encounter from 8/3/18 for more information. Patient has been identified as non-compliant with statin therapy by [place]. Current prescription on file is from 2016. [facility]. Patient states he stopped taking for the past year and 3 months due to chemo and not being able to take medication. Patient has since completed chemo and will start to take Crestor again. Please sign pended order for Crestor if you agree the patient should restart this medication. Your 9/7/18 office visit note states the patient is taking a statin for hyperlipidemia.       Thank you,    Bertin Leonard, PharmD  [address] [place] [address] Pharmacist  [phone] or [phone] (Option 7)

## 2018-09-27 NOTE — TELEPHONE ENCOUNTER
Wanda Castaneda - PCP approved refill request for Crestor. [facility]. Please contact patient to let him know this prescription is available for .       Thank you,    Bertin Leonard, PharmD  [address] [place] [address] Pharmacist  [phone] or [phone] (Option 7)

## 2018-10-03 NOTE — TELEPHONE ENCOUNTER
Per representative from [place] [id number], patient picked up 30 day supply of Crestor on 10/1/18 billed through part D insurance.       Samson Carter, PharmD  [address] [facility] Pharmacist  [phone] or [phone] (Option 7)

## 2018-10-19 ENCOUNTER — HOSPITAL ENCOUNTER (OUTPATIENT)
Dept: CT IMAGING | Age: 68
Discharge: HOME | End: 2018-10-19
Payer: COMMERCIAL

## 2018-10-19 DIAGNOSIS — C34.11 MALIGNANT NEOPLASM OF UPPER LOBE OF RIGHT LUNG (HCC): ICD-10-CM

## 2018-10-19 PROCEDURE — 71250 CT THORAX DX C-: CPT

## 2018-10-24 ENCOUNTER — OFFICE VISIT (OUTPATIENT)
Dept: PULMONOLOGY | Age: 68
End: 2018-10-24
Payer: COMMERCIAL

## 2018-10-24 VITALS
DIASTOLIC BLOOD PRESSURE: 78 MMHG | WEIGHT: 127 LBS | OXYGEN SATURATION: 97 % | HEIGHT: 68 IN | RESPIRATION RATE: 16 BRPM | HEART RATE: 106 BPM | BODY MASS INDEX: 19.25 KG/M2 | SYSTOLIC BLOOD PRESSURE: 136 MMHG

## 2018-10-24 DIAGNOSIS — C34.11 MALIGNANT NEOPLASM OF UPPER LOBE OF RIGHT LUNG (HCC): Primary | ICD-10-CM

## 2018-10-24 DIAGNOSIS — R10.10 PAIN OF UPPER ABDOMEN: ICD-10-CM

## 2018-10-24 PROCEDURE — 99214 OFFICE O/P EST MOD 30 MIN: CPT | Performed by: INTERNAL MEDICINE

## 2018-10-24 RX ORDER — ALBUTEROL SULFATE 90 UG/1
2 AEROSOL, METERED RESPIRATORY (INHALATION) EVERY 6 HOURS PRN
Qty: 1 INHALER | Refills: 3 | Status: SHIPPED | OUTPATIENT
Start: 2018-10-24 | End: 2018-12-12 | Stop reason: SDUPTHER

## 2018-10-24 NOTE — PROGRESS NOTES
[facility] hospital Pulmonary and Critical Care    Outpatient Follow Up Note    Subjective:   CHIEF COMPLAINT / HPI:     The patient is 77 y.o. male who presents today for COPD, and lung cancer. Vangie Like is doing pretty well. He's still active mowing the lawn. He occasionally needs an inhaler when he does yard work. He's here to follow up his surveillance CT. He has a new complaint of abdominal pain which is worse in the mornings. He's gestures to the pain being epigastric and going up into his chest.  It's episodic and not brought on by exertion. Previous history: Since last time I saw Mr. Sarah Wray in the office I performed a bronchoscopy with intent to do ebus however he had complete obstruction of his right upper lobe bronchus required debulking and biopsy and eventually stenting of the airway by Dr. Elizabeth Daley. Patient's lung biopsies came back with squamous cell carcinoma. Following the stent placement which blocks off his right upper lobe Mr. Sarah Wray developed an asymptomatic right sided pneumothorax with complete collapse of the right lung. He was admitted to the hospital and we placed a small bore chest tube which evacuated the pneumothorax and then the chest tube fell out a day and a half later without resolution of the pneumothorax is in the hospital.  However, when he went to his next CT simulation for radiation oncology 4 days later the pneumothorax had recurred. Instead of admitting him this time because he was again asymptomatic, I sent him to interventional radiology where a new small bore chest tube was placed and attached to a pneumo stat. He's had a small chest tube in for 2 weeks without recurrence of the pneumothorax thus far. He's feeling pretty well today and had radiation therapy earlier today. He's concerned because the tubing on his chest tube is getting longer and he scraped the chest tube is coming out. He does not want to have a third one placed.      Past Medical History:    Past

## 2018-12-05 ENCOUNTER — HOSPITAL ENCOUNTER (OUTPATIENT)
Dept: CT IMAGING | Age: 68
Discharge: HOME | End: 2018-12-05
Payer: COMMERCIAL

## 2018-12-05 ENCOUNTER — OFFICE VISIT (OUTPATIENT)
Dept: PULMONOLOGY | Age: 68
End: 2018-12-05
Payer: COMMERCIAL

## 2018-12-05 VITALS
HEIGHT: 68 IN | BODY MASS INDEX: 19.55 KG/M2 | WEIGHT: 129 LBS | SYSTOLIC BLOOD PRESSURE: 100 MMHG | DIASTOLIC BLOOD PRESSURE: 80 MMHG | HEART RATE: 120 BPM | OXYGEN SATURATION: 100 %

## 2018-12-05 DIAGNOSIS — J44.9 COPD, MODERATE (HCC): ICD-10-CM

## 2018-12-05 DIAGNOSIS — C34.11 MALIGNANT NEOPLASM OF UPPER LOBE OF RIGHT LUNG (HCC): Primary | ICD-10-CM

## 2018-12-05 DIAGNOSIS — C34.11 MALIGNANT NEOPLASM OF UPPER LOBE OF RIGHT LUNG (HCC): ICD-10-CM

## 2018-12-05 PROCEDURE — 71250 CT THORAX DX C-: CPT

## 2018-12-05 PROCEDURE — 99213 OFFICE O/P EST LOW 20 MIN: CPT | Performed by: INTERNAL MEDICINE

## 2018-12-12 ENCOUNTER — OFFICE VISIT (OUTPATIENT)
Dept: INTERNAL MEDICINE CLINIC | Age: 68
End: 2018-12-12
Payer: COMMERCIAL

## 2018-12-12 VITALS
SYSTOLIC BLOOD PRESSURE: 126 MMHG | BODY MASS INDEX: 19.67 KG/M2 | DIASTOLIC BLOOD PRESSURE: 78 MMHG | OXYGEN SATURATION: 98 % | HEART RATE: 113 BPM | HEIGHT: 68 IN | RESPIRATION RATE: 16 BRPM | WEIGHT: 129.8 LBS

## 2018-12-12 DIAGNOSIS — J30.2 SEASONAL ALLERGIC RHINITIS, UNSPECIFIED TRIGGER: Chronic | ICD-10-CM

## 2018-12-12 DIAGNOSIS — E11.9 TYPE 2 DIABETES MELLITUS WITHOUT COMPLICATION, WITHOUT LONG-TERM CURRENT USE OF INSULIN (HCC): Primary | ICD-10-CM

## 2018-12-12 DIAGNOSIS — C34.01 MALIGNANT NEOPLASM OF RIGHT MAIN BRONCHUS (HCC): ICD-10-CM

## 2018-12-12 DIAGNOSIS — K21.9 GASTROESOPHAGEAL REFLUX DISEASE WITHOUT ESOPHAGITIS: ICD-10-CM

## 2018-12-12 DIAGNOSIS — J41.0 SIMPLE CHRONIC BRONCHITIS (HCC): ICD-10-CM

## 2018-12-12 DIAGNOSIS — E78.2 MIXED HYPERLIPIDEMIA: ICD-10-CM

## 2018-12-12 DIAGNOSIS — J44.9 COPD, MODERATE (HCC): ICD-10-CM

## 2018-12-12 DIAGNOSIS — M1A.0790 IDIOPATHIC CHRONIC GOUT OF FOOT WITHOUT TOPHUS, UNSPECIFIED LATERALITY: ICD-10-CM

## 2018-12-12 DIAGNOSIS — I10 ESSENTIAL HYPERTENSION: ICD-10-CM

## 2018-12-12 PROCEDURE — 99214 OFFICE O/P EST MOD 30 MIN: CPT | Performed by: INTERNAL MEDICINE

## 2018-12-12 RX ORDER — ALBUTEROL SULFATE 90 UG/1
2 AEROSOL, METERED RESPIRATORY (INHALATION) EVERY 6 HOURS PRN
Qty: 1 INHALER | Refills: 3 | Status: SHIPPED | OUTPATIENT
Start: 2018-12-12 | End: 2020-03-09 | Stop reason: SDUPTHER

## 2018-12-12 RX ORDER — FLUTICASONE PROPIONATE 50 MCG
1 SPRAY, SUSPENSION (ML) NASAL DAILY PRN
COMMUNITY
End: 2019-03-08 | Stop reason: ALTCHOICE

## 2018-12-12 RX ORDER — OMEPRAZOLE 20 MG/1
20 CAPSULE, DELAYED RELEASE ORAL PRN
COMMUNITY

## 2018-12-12 ASSESSMENT — PATIENT HEALTH QUESTIONNAIRE - PHQ9
1. LITTLE INTEREST OR PLEASURE IN DOING THINGS: 0
SUM OF ALL RESPONSES TO PHQ QUESTIONS 1-9: 0
SUM OF ALL RESPONSES TO PHQ QUESTIONS 1-9: 0
SUM OF ALL RESPONSES TO PHQ9 QUESTIONS 1 & 2: 0
2. FEELING DOWN, DEPRESSED OR HOPELESS: 0

## 2018-12-12 ASSESSMENT — ENCOUNTER SYMPTOMS
COUGH: 1
DIARRHEA: 1
NAUSEA: 0
ANAL BLEEDING: 0
RHINORRHEA: 1
EYES NEGATIVE: 1
ALLERGIC/IMMUNOLOGIC NEGATIVE: 1

## 2018-12-17 ENCOUNTER — APPOINTMENT (OUTPATIENT)
Dept: CT IMAGING | Age: 68
DRG: 293 | End: 2018-12-17
Payer: COMMERCIAL

## 2018-12-17 ENCOUNTER — HOSPITAL ENCOUNTER (INPATIENT)
Age: 68
LOS: 2 days | Discharge: HOME | DRG: 293 | End: 2018-12-20
Attending: EMERGENCY MEDICINE | Admitting: INTERNAL MEDICINE
Payer: COMMERCIAL

## 2018-12-17 DIAGNOSIS — J81.0 ACUTE PULMONARY EDEMA (HCC): Primary | ICD-10-CM

## 2018-12-17 LAB
A/G RATIO: 1.2 (ref 1.1–2.2)
ALBUMIN SERPL-MCNC: 3.9 G/DL (ref 3.4–5)
ALP BLD-CCNC: 291 U/L (ref 40–129)
ALT SERPL-CCNC: 16 U/L (ref 10–40)
ANION GAP SERPL CALCULATED.3IONS-SCNC: 19 MMOL/L (ref 3–16)
AST SERPL-CCNC: 36 U/L (ref 15–37)
BASOPHILS ABSOLUTE: 0 K/UL (ref 0–0.2)
BASOPHILS RELATIVE PERCENT: 0.5 %
BILIRUB SERPL-MCNC: 0.3 MG/DL (ref 0–1)
BUN BLDV-MCNC: 12 MG/DL (ref 7–20)
CALCIUM SERPL-MCNC: 8.9 MG/DL (ref 8.3–10.6)
CHLORIDE BLD-SCNC: 106 MMOL/L (ref 99–110)
CO2: 19 MMOL/L (ref 21–32)
CREAT SERPL-MCNC: 1.1 MG/DL (ref 0.8–1.3)
EOSINOPHILS ABSOLUTE: 0.1 K/UL (ref 0–0.6)
EOSINOPHILS RELATIVE PERCENT: 1.6 %
GFR AFRICAN AMERICAN: >60
GFR NON-AFRICAN AMERICAN: >60
GLOBULIN: 3.3 G/DL
GLUCOSE BLD-MCNC: 266 MG/DL (ref 70–99)
HCT VFR BLD CALC: 34.2 % (ref 40.5–52.5)
HEMOGLOBIN: 10.9 G/DL (ref 13.5–17.5)
LACTIC ACID, SEPSIS: 1.8 MMOL/L (ref 0.4–1.9)
LACTIC ACID, SEPSIS: 4.1 MMOL/L (ref 0.4–1.9)
LYMPHOCYTES ABSOLUTE: 0.6 K/UL (ref 1–5.1)
LYMPHOCYTES RELATIVE PERCENT: 13.2 %
MCH RBC QN AUTO: 28.3 PG (ref 26–34)
MCHC RBC AUTO-ENTMCNC: 31.7 G/DL (ref 31–36)
MCV RBC AUTO: 89.4 FL (ref 80–100)
MONOCYTES ABSOLUTE: 0.3 K/UL (ref 0–1.3)
MONOCYTES RELATIVE PERCENT: 5.7 %
NEUTROPHILS ABSOLUTE: 3.6 K/UL (ref 1.7–7.7)
NEUTROPHILS RELATIVE PERCENT: 79 %
PDW BLD-RTO: 14.3 % (ref 12.4–15.4)
PLATELET # BLD: 244 K/UL (ref 135–450)
PMV BLD AUTO: 8.1 FL (ref 5–10.5)
POTASSIUM SERPL-SCNC: 3.9 MMOL/L (ref 3.5–5.1)
PRO-BNP: 2529 PG/ML (ref 0–124)
RAPID INFLUENZA  B AGN: NEGATIVE
RAPID INFLUENZA A AGN: NEGATIVE
RBC # BLD: 3.83 M/UL (ref 4.2–5.9)
SODIUM BLD-SCNC: 144 MMOL/L (ref 136–145)
TOTAL PROTEIN: 7.2 G/DL (ref 6.4–8.2)
TROPONIN: <0.01 NG/ML
WBC # BLD: 4.6 K/UL (ref 4–11)

## 2018-12-17 PROCEDURE — 85025 COMPLETE CBC W/AUTO DIFF WBC: CPT

## 2018-12-17 PROCEDURE — 80053 COMPREHEN METABOLIC PANEL: CPT

## 2018-12-17 PROCEDURE — 6370000000 HC RX 637 (ALT 250 FOR IP): Performed by: NURSE PRACTITIONER

## 2018-12-17 PROCEDURE — 83880 ASSAY OF NATRIURETIC PEPTIDE: CPT

## 2018-12-17 PROCEDURE — 93005 ELECTROCARDIOGRAM TRACING: CPT | Performed by: NURSE PRACTITIONER

## 2018-12-17 PROCEDURE — 87040 BLOOD CULTURE FOR BACTERIA: CPT

## 2018-12-17 PROCEDURE — 71260 CT THORAX DX C+: CPT

## 2018-12-17 PROCEDURE — 99285 EMERGENCY DEPT VISIT HI MDM: CPT

## 2018-12-17 PROCEDURE — 96365 THER/PROPH/DIAG IV INF INIT: CPT

## 2018-12-17 PROCEDURE — 6360000002 HC RX W HCPCS: Performed by: EMERGENCY MEDICINE

## 2018-12-17 PROCEDURE — 6360000002 HC RX W HCPCS: Performed by: NURSE PRACTITIONER

## 2018-12-17 PROCEDURE — 6360000004 HC RX CONTRAST MEDICATION: Performed by: EMERGENCY MEDICINE

## 2018-12-17 PROCEDURE — 96366 THER/PROPH/DIAG IV INF ADDON: CPT

## 2018-12-17 PROCEDURE — 94664 DEMO&/EVAL PT USE INHALER: CPT

## 2018-12-17 PROCEDURE — 2580000003 HC RX 258: Performed by: NURSE PRACTITIONER

## 2018-12-17 PROCEDURE — 94640 AIRWAY INHALATION TREATMENT: CPT

## 2018-12-17 PROCEDURE — 96375 TX/PRO/DX INJ NEW DRUG ADDON: CPT

## 2018-12-17 PROCEDURE — 87804 INFLUENZA ASSAY W/OPTIC: CPT

## 2018-12-17 PROCEDURE — 84484 ASSAY OF TROPONIN QUANT: CPT

## 2018-12-17 PROCEDURE — 83605 ASSAY OF LACTIC ACID: CPT

## 2018-12-17 RX ORDER — IPRATROPIUM BROMIDE AND ALBUTEROL SULFATE 2.5; .5 MG/3ML; MG/3ML
1 SOLUTION RESPIRATORY (INHALATION) ONCE
Status: COMPLETED | OUTPATIENT
Start: 2018-12-17 | End: 2018-12-17

## 2018-12-17 RX ORDER — METHYLPREDNISOLONE SODIUM SUCCINATE 125 MG/2ML
125 INJECTION, POWDER, LYOPHILIZED, FOR SOLUTION INTRAMUSCULAR; INTRAVENOUS ONCE
Status: COMPLETED | OUTPATIENT
Start: 2018-12-17 | End: 2018-12-17

## 2018-12-17 RX ORDER — ALBUTEROL SULFATE 2.5 MG/3ML
5 SOLUTION RESPIRATORY (INHALATION) ONCE
Status: COMPLETED | OUTPATIENT
Start: 2018-12-17 | End: 2018-12-17

## 2018-12-17 RX ORDER — 0.9 % SODIUM CHLORIDE 0.9 %
1000 INTRAVENOUS SOLUTION INTRAVENOUS ONCE
Status: COMPLETED | OUTPATIENT
Start: 2018-12-17 | End: 2018-12-17

## 2018-12-17 RX ORDER — LEVOFLOXACIN 5 MG/ML
750 INJECTION, SOLUTION INTRAVENOUS ONCE
Status: COMPLETED | OUTPATIENT
Start: 2018-12-17 | End: 2018-12-17

## 2018-12-17 RX ORDER — FUROSEMIDE 10 MG/ML
40 INJECTION INTRAMUSCULAR; INTRAVENOUS ONCE
Status: COMPLETED | OUTPATIENT
Start: 2018-12-18 | End: 2018-12-18

## 2018-12-17 RX ADMIN — METHYLPREDNISOLONE SODIUM SUCCINATE 125 MG: 125 INJECTION, POWDER, FOR SOLUTION INTRAMUSCULAR; INTRAVENOUS at 20:25

## 2018-12-17 RX ADMIN — IOPAMIDOL 75 ML: 755 INJECTION, SOLUTION INTRAVENOUS at 21:43

## 2018-12-17 RX ADMIN — SODIUM CHLORIDE 1000 ML: 9 INJECTION, SOLUTION INTRAVENOUS at 22:01

## 2018-12-17 RX ADMIN — ALBUTEROL SULFATE 2.5 MG: 2.5 SOLUTION RESPIRATORY (INHALATION) at 20:24

## 2018-12-17 RX ADMIN — IPRATROPIUM BROMIDE AND ALBUTEROL SULFATE 1 AMPULE: .5; 3 SOLUTION RESPIRATORY (INHALATION) at 20:24

## 2018-12-17 RX ADMIN — LEVOFLOXACIN 750 MG: 5 INJECTION, SOLUTION INTRAVENOUS at 22:00

## 2018-12-17 ASSESSMENT — ENCOUNTER SYMPTOMS
VOMITING: 0
ABDOMINAL PAIN: 0
WHEEZING: 1
DIARRHEA: 0
NAUSEA: 0
SHORTNESS OF BREATH: 1
CHEST TIGHTNESS: 1
COUGH: 1

## 2018-12-18 ENCOUNTER — APPOINTMENT (OUTPATIENT)
Dept: GENERAL RADIOLOGY | Age: 68
DRG: 293 | End: 2018-12-18
Payer: COMMERCIAL

## 2018-12-18 PROBLEM — J90 PLEURAL EFFUSION, BILATERAL: Status: ACTIVE | Noted: 2018-12-18

## 2018-12-18 LAB
EKG ATRIAL RATE: 119 BPM
EKG DIAGNOSIS: NORMAL
EKG P AXIS: 10 DEGREES
EKG P-R INTERVAL: 128 MS
EKG Q-T INTERVAL: 336 MS
EKG QRS DURATION: 78 MS
EKG QTC CALCULATION (BAZETT): 472 MS
EKG R AXIS: 18 DEGREES
EKG T AXIS: 117 DEGREES
EKG VENTRICULAR RATE: 119 BPM
GLUCOSE BLD-MCNC: 129 MG/DL (ref 70–99)
GLUCOSE BLD-MCNC: 264 MG/DL (ref 70–99)
GLUCOSE BLD-MCNC: 333 MG/DL (ref 70–99)
GLUCOSE BLD-MCNC: 336 MG/DL (ref 70–99)
GLUCOSE BLD-MCNC: 412 MG/DL (ref 70–99)
LEFT VENTRICULAR EJECTION FRACTION HIGH VALUE: 45 %
LEFT VENTRICULAR EJECTION FRACTION MODE: NORMAL
LV EF: 40 %
LV EF: 43 %
LVEF MODALITY: NORMAL
PERFORMED ON: ABNORMAL

## 2018-12-18 PROCEDURE — 6370000000 HC RX 637 (ALT 250 FOR IP): Performed by: INTERNAL MEDICINE

## 2018-12-18 PROCEDURE — 71046 X-RAY EXAM CHEST 2 VIEWS: CPT

## 2018-12-18 PROCEDURE — 6360000002 HC RX W HCPCS: Performed by: NURSE PRACTITIONER

## 2018-12-18 PROCEDURE — 93010 ELECTROCARDIOGRAM REPORT: CPT | Performed by: INTERNAL MEDICINE

## 2018-12-18 PROCEDURE — 6370000000 HC RX 637 (ALT 250 FOR IP): Performed by: NURSE PRACTITIONER

## 2018-12-18 PROCEDURE — 96375 TX/PRO/DX INJ NEW DRUG ADDON: CPT

## 2018-12-18 PROCEDURE — 94760 N-INVAS EAR/PLS OXIMETRY 1: CPT

## 2018-12-18 PROCEDURE — 99222 1ST HOSP IP/OBS MODERATE 55: CPT | Performed by: INTERNAL MEDICINE

## 2018-12-18 PROCEDURE — 2580000003 HC RX 258: Performed by: NURSE PRACTITIONER

## 2018-12-18 PROCEDURE — 2060000000 HC ICU INTERMEDIATE R&B

## 2018-12-18 PROCEDURE — 93306 TTE W/DOPPLER COMPLETE: CPT

## 2018-12-18 RX ORDER — DEXTROSE MONOHYDRATE 50 MG/ML
100 INJECTION, SOLUTION INTRAVENOUS PRN
Status: DISCONTINUED | OUTPATIENT
Start: 2018-12-18 | End: 2018-12-20 | Stop reason: HOSPADM

## 2018-12-18 RX ORDER — FUROSEMIDE 10 MG/ML
40 INJECTION INTRAMUSCULAR; INTRAVENOUS ONCE
Status: DISCONTINUED | OUTPATIENT
Start: 2018-12-18 | End: 2018-12-18

## 2018-12-18 RX ORDER — DEXTROSE MONOHYDRATE 25 G/50ML
12.5 INJECTION, SOLUTION INTRAVENOUS PRN
Status: DISCONTINUED | OUTPATIENT
Start: 2018-12-18 | End: 2018-12-20 | Stop reason: HOSPADM

## 2018-12-18 RX ORDER — SODIUM CHLORIDE 0.9 % (FLUSH) 0.9 %
10 SYRINGE (ML) INJECTION PRN
Status: DISCONTINUED | OUTPATIENT
Start: 2018-12-18 | End: 2018-12-20 | Stop reason: HOSPADM

## 2018-12-18 RX ORDER — PANTOPRAZOLE SODIUM 40 MG/1
40 TABLET, DELAYED RELEASE ORAL
Status: DISCONTINUED | OUTPATIENT
Start: 2018-12-18 | End: 2018-12-20 | Stop reason: HOSPADM

## 2018-12-18 RX ORDER — ONDANSETRON 2 MG/ML
4 INJECTION INTRAMUSCULAR; INTRAVENOUS EVERY 6 HOURS PRN
Status: DISCONTINUED | OUTPATIENT
Start: 2018-12-18 | End: 2018-12-20 | Stop reason: HOSPADM

## 2018-12-18 RX ORDER — ASPIRIN 81 MG/1
81 TABLET ORAL DAILY
Status: DISCONTINUED | OUTPATIENT
Start: 2018-12-18 | End: 2018-12-20 | Stop reason: HOSPADM

## 2018-12-18 RX ORDER — AMLODIPINE BESYLATE 5 MG/1
10 TABLET ORAL DAILY
Status: DISCONTINUED | OUTPATIENT
Start: 2018-12-18 | End: 2018-12-20 | Stop reason: HOSPADM

## 2018-12-18 RX ORDER — NICOTINE POLACRILEX 4 MG
15 LOZENGE BUCCAL PRN
Status: DISCONTINUED | OUTPATIENT
Start: 2018-12-18 | End: 2018-12-20 | Stop reason: HOSPADM

## 2018-12-18 RX ORDER — ROSUVASTATIN CALCIUM 10 MG/1
40 TABLET, COATED ORAL NIGHTLY
Status: DISCONTINUED | OUTPATIENT
Start: 2018-12-18 | End: 2018-12-20 | Stop reason: HOSPADM

## 2018-12-18 RX ORDER — FLUTICASONE PROPIONATE 50 MCG
1 SPRAY, SUSPENSION (ML) NASAL DAILY
Status: DISCONTINUED | OUTPATIENT
Start: 2018-12-18 | End: 2018-12-20 | Stop reason: HOSPADM

## 2018-12-18 RX ORDER — SODIUM CHLORIDE 0.9 % (FLUSH) 0.9 %
10 SYRINGE (ML) INJECTION EVERY 12 HOURS SCHEDULED
Status: DISCONTINUED | OUTPATIENT
Start: 2018-12-18 | End: 2018-12-20 | Stop reason: HOSPADM

## 2018-12-18 RX ORDER — IPRATROPIUM BROMIDE AND ALBUTEROL SULFATE 2.5; .5 MG/3ML; MG/3ML
1 SOLUTION RESPIRATORY (INHALATION) EVERY 4 HOURS PRN
Status: DISCONTINUED | OUTPATIENT
Start: 2018-12-18 | End: 2018-12-20 | Stop reason: HOSPADM

## 2018-12-18 RX ORDER — LISINOPRIL 20 MG/1
20 TABLET ORAL DAILY
Status: DISCONTINUED | OUTPATIENT
Start: 2018-12-18 | End: 2018-12-20 | Stop reason: HOSPADM

## 2018-12-18 RX ADMIN — LEVOFLOXACIN 750 MG: 500 TABLET, FILM COATED ORAL at 22:07

## 2018-12-18 RX ADMIN — ENOXAPARIN SODIUM 40 MG: 40 INJECTION SUBCUTANEOUS at 08:35

## 2018-12-18 RX ADMIN — PANTOPRAZOLE SODIUM 40 MG: 40 TABLET, DELAYED RELEASE ORAL at 06:50

## 2018-12-18 RX ADMIN — ROSUVASTATIN CALCIUM 40 MG: 10 TABLET, FILM COATED ORAL at 22:08

## 2018-12-18 RX ADMIN — Medication 10 ML: at 22:08

## 2018-12-18 RX ADMIN — INSULIN GLARGINE 20 UNITS: 100 INJECTION, SOLUTION SUBCUTANEOUS at 22:09

## 2018-12-18 RX ADMIN — INSULIN LISPRO 4 UNITS: 100 INJECTION, SOLUTION INTRAVENOUS; SUBCUTANEOUS at 08:35

## 2018-12-18 RX ADMIN — FUROSEMIDE 40 MG: 10 INJECTION, SOLUTION INTRAMUSCULAR; INTRAVENOUS at 00:10

## 2018-12-18 RX ADMIN — INSULIN LISPRO 18 UNITS: 100 INJECTION, SOLUTION INTRAVENOUS; SUBCUTANEOUS at 12:20

## 2018-12-18 RX ADMIN — ASPIRIN 81 MG: 81 TABLET, COATED ORAL at 08:35

## 2018-12-18 RX ADMIN — Medication 10 ML: at 08:35

## 2018-12-18 RX ADMIN — INSULIN LISPRO 9 UNITS: 100 INJECTION, SOLUTION INTRAVENOUS; SUBCUTANEOUS at 17:35

## 2018-12-18 RX ADMIN — LISINOPRIL 20 MG: 20 TABLET ORAL at 08:35

## 2018-12-18 RX ADMIN — AMLODIPINE BESYLATE 10 MG: 5 TABLET ORAL at 08:35

## 2018-12-18 ASSESSMENT — PAIN SCALES - GENERAL
PAINLEVEL_OUTOF10: 0

## 2018-12-18 NOTE — ED PROVIDER NOTES
symptoms do include infectious etiology, primary cardiac disease, reactive airway disease COPD exacerbation or mucus plugging    Clinical impression 1. Dyspnea 2.   Tachycardia     Valentino Stark, MD  12/17/18 2027
discharge. Neck: Normal range of motion. Neck supple. No JVD present. Cardiovascular: Regular rhythm. Tachycardia present. No murmur heard. Pulses:       Radial pulses are 2+ on the right side, and 2+ on the left side. Rate 124   Pulmonary/Chest: Effort normal. No respiratory distress. He has wheezes. Abdominal: Soft. He exhibits no mass. There is no tenderness. Musculoskeletal: Normal range of motion. Neurological: He is alert and oriented to person, place, and time. Skin: Skin is warm and dry. He is not diaphoretic. No pallor. Psychiatric: He has a normal mood and affect. His behavior is normal.   Nursing note and vitals reviewed.       DIAGNOSTIC RESULTS   LABS:    Labs Reviewed   CBC WITH AUTO DIFFERENTIAL - Abnormal; Notable for the following:        Result Value    RBC 3.83 (*)     Hemoglobin 10.9 (*)     Hematocrit 34.2 (*)     Lymphocytes # 0.6 (*)     All other components within normal limits    Narrative:     Performed at:  [facility] [address],  [facility] [place], [employer]   Phone [phone]   COMPREHENSIVE METABOLIC PANEL - Abnormal; Notable for the following:     CO2 19 (*)     Anion Gap 19 (*)     Glucose 266 (*)     Alkaline Phosphatase 291 (*)     All other components within normal limits    Narrative:     Performed at:  [facility] [address],  [facility] [place], [address]   Phone [phone]   LACTATE, SEPSIS - Abnormal; Notable for the following:     Lactic Acid, Sepsis 4.1 (*)     All other components within normal limits    Narrative:     Daniel Subramanian  [facility] tel. [phone],  Chemistry results called to and read back by ELIAZAR Owens, 12/17/2018  20:46, by Maritza Hernadnez  Performed at:  [facility] [address],  [facility] [place], [address]   Phone [phone]   BRAIN NATRIURETIC PEPTIDE - Abnormal; Notable for the following:     Pro-BNP 2,529 (*)     All other components within normal

## 2018-12-18 NOTE — CONSULTS
1 spray, 1 spray, Each Nare, Daily  insulin glargine (LANTUS) injection pen 20 Units, 20 Units, Subcutaneous, Nightly  lisinopril (PRINIVIL;ZESTRIL) tablet 20 mg, 20 mg, Oral, Daily  pantoprazole (PROTONIX) tablet 40 mg, 40 mg, Oral, QAM AC  rosuvastatin (CRESTOR) tablet 40 mg, 40 mg, Oral, Nightly  ipratropium-albuterol (DUONEB) nebulizer solution 1 ampule, 1 ampule, Inhalation, Q4H PRN  sodium chloride flush 0.9 % injection 10 mL, 10 mL, Intravenous, 2 times per day  sodium chloride flush 0.9 % injection 10 mL, 10 mL, Intravenous, PRN  ondansetron (ZOFRAN) injection 4 mg, 4 mg, Intravenous, Q6H PRN  enoxaparin (LOVENOX) injection 40 mg, 40 mg, Subcutaneous, Daily  magnesium hydroxide (MILK OF MAGNESIA) 400 MG/5ML suspension 30 mL, 30 mL, Oral, Daily PRN  glucose (GLUTOSE) 40 % oral gel 15 g, 15 g, Oral, PRN  dextrose 50 % solution 12.5 g, 12.5 g, Intravenous, PRN  glucagon (rDNA) injection 1 mg, 1 mg, Intramuscular, PRN  dextrose 5 % solution, 100 mL/hr, Intravenous, PRN  insulin lispro (HUMALOG) injection pen 0-6 Units, 0-6 Units, Subcutaneous, TID WC  insulin lispro (HUMALOG) injection pen 0-3 Units, 0-3 Units, Subcutaneous, Nightly  levofloxacin (LEVAQUIN) tablet 750 mg, 750 mg, Oral, QPM    No Known Allergies    Social History:    TOBACCO:   reports that he quit smoking about 19 months ago. His smoking use included Cigarettes. He started smoking about 53 years ago. He has a 20.00 pack-year smoking history. He has never used smokeless tobacco.  ETOH:   reports that he does not drink alcohol. Patient currently lives independently    Family History:   Family History   Problem Relation Age of Onset    Breast Cancer Mother      REVIEW OF SYSTEMS:    CONSTITUTIONAL:  negative for fevers, chills, diaphoresis, activity change, appetite change, fatigue, night sweats and unexpected weight change.    EYES:  negative for blurred vision, eye discharge, visual disturbance and icterus  HEENT:  negative for hearing loss, tinnitus, ear drainage, sinus pressure, nasal congestion, epistaxis and snoring  RESPIRATORY:  See HPI  CARDIOVASCULAR:  negative for chest pain, palpitations, exertional chest pressure/discomfort, edema, syncope  GASTROINTESTINAL:  negative for nausea, vomiting, diarrhea, constipation, blood in stool and abdominal pain  GENITOURINARY:  negative for frequency, dysuria, urinary incontinence, decreased urine volume, and hematuria  HEMATOLOGIC/LYMPHATIC:  negative for easy bruising, bleeding and lymphadenopathy  ALLERGIC/IMMUNOLOGIC:  negative for recurrent infections, angioedema, anaphylaxis and drug reactions  ENDOCRINE:  negative for weight changes and diabetic symptoms including polyuria, polydipsia and polyphagia  MUSCULOSKELETAL:  negative for  pain, joint swelling, decreased range of motion and muscle weakness  NEUROLOGICAL:  negative for headaches, slurred speech, unilateral weakness  PSYCHIATRIC/BEHAVIORAL: negative for hallucinations, behavioral problems, confusion and agitation. Objective:   PHYSICAL EXAM:      VITALS:  BP (!) 150/92   Pulse 116   Temp 97.9 °F (36.6 °C) (Temporal)   Resp 18   Ht 5' 8\" (1.727 m)   Wt 125 lb 14.4 oz (57.1 kg)   SpO2 98%   BMI 19.14 kg/m²      24HR INTAKE/OUTPUT:    Intake/Output Summary (Last 24 hours) at 12/18/18 1003  Last data filed at 12/18/18 0810   Gross per 24 hour   Intake             2856 ml   Output             2875 ml   Net              -19 ml     CONSTITUTIONAL:  awake, alert, cooperative, no apparent distress, and appears stated age  NECK:  Supple, symmetrical, trachea midline, no adenopathy, thyroid symmetric, not enlarged and no tenderness, skin normal  LUNGS:  no increased work of breathing and clear to auscultation. No accessory muscle use  CARDIOVASCULAR: S1 and S2, no edema and no JVD  ABDOMEN:  normal bowel sounds, non-distended and no masses palpated, and no tenderness to palpation.  No hepatospleenomegaly  LYMPHADENOPATHY:  no axillary or

## 2018-12-18 NOTE — ED NOTES
Pt resting comfortably with no signs of distress. Denies any needs at this time. Bed locked and in lowest position with both side rails raise. Call light within reach.        Alecia Nur RN  12/17/18 7046

## 2018-12-18 NOTE — ED NOTES
Bed: 11  Expected date:   Expected time:   Means of arrival: [facility]  Comments:     Saige Collins RN  12/17/18 2001

## 2018-12-18 NOTE — CARE COORDINATION
Discharge Planning Assessment  RN/SW discharge planner met with patient/(and family member) to discuss reason for admission, current living situation, and potential needs at the time of discharge    Demographics/Insurance verified Yes    Current type of dwellin [address] [address]    Patient from ECF/SW confirmed with:    Living arrangements: no steps to enter family home    Level of function/Support: I- ADL's,  Wife and 3 daughters    DME:   cane, walker, rollator   Active with any community resources/agencies/skilled home care:    Medication compliance issues: no    Financial issues that could impact healthcare: no    Transportation at the time of discharge: wife/daughter    Tentative discharge plan:   Met with patient, wife, and daughter, regarding his discharge plan. Patient states that he lives at home with family and that he is independent in ADL's and IADL's. Patient states that he anticipates discharging to home with no needs.

## 2018-12-18 NOTE — H&P
Sig Start Date End Date Taking? Authorizing Provider   albuterol sulfate HFA (PROAIR HFA) 108 (90 Base) MCG/ACT inhaler Inhale 2 puffs into the lungs every 6 hours as needed for Wheezing 12/12/18  Yes Binh Buck MD   omeprazole (PRILOSEC) 20 MG delayed release capsule Take 20 mg by mouth daily   Yes Historical Provider, MD   fluticasone (FLONASE) 50 MCG/ACT nasal spray 1 spray by Each Nare route daily   Yes Historical Provider, MD   Loratadine (CLARITIN PO) Take by mouth   Yes Historical Provider, MD   Dextromethorphan-Guaifenesin ([place] [address] DM PO) Take by mouth   Yes Historical Provider, MD   rosuvastatin (CRESTOR) 40 MG tablet Take 1 tablet by mouth every evening 9/26/18  Yes Binh Buck MD   metFORMIN (GLUCOPHAGE) 500 MG tablet TAKE 2 TABLETS BY MOUTH 2 TIMES DAILY (WITH MEALS) 8/20/18  Yes Binh Buck MD   insulin glargine (LANTUS) 100 UNIT/ML injection pen Inject 20 Units into the skin nightly 4/13/18  Yes Tatianna Arredondo MD   lisinopril (PRINIVIL;ZESTRIL) 40 MG tablet TAKE 1 TABLET BY MOUTH EVERY DAY  Patient taking differently: 20 mg TAKE 1 TABLET BY MOUTH EVERY DAY 3/30/18  Yes Binh Buck MD   amLODIPine (NORVASC) 10 MG tablet TAKE 1 TABLET BY MOUTH EVERY DAY 11/17/17  Yes Binh Buck MD   aspirin 81 MG tablet Take 81 mg by mouth daily   Yes Historical Provider, MD   vitamin D (CHOLECALCIFEROL) 1000 UNIT TABS tablet Take 2,000 Units by mouth daily    Historical Provider, MD       Allergies:  No Known Allergies     Social History:  The patient currently lives at home. Tobacco:  reports that he quit smoking about 19 months ago. His smoking use included Cigarettes. He started smoking about 53 years ago. He has a 20.00 pack-year smoking history. He has never used smokeless tobacco.  ETOH:  reports that he does not drink alcohol. Family History: Reviewed in detail and negative for DM,early CAD, Cancer and CVA. Positive as follows:  family history includes Breast Cancer in his mother. PHYSICAL EXAM:  BP (!) 139/91   Pulse 112   Temp 97.9 °F (36.6 °C) (Oral)   Resp 26   Ht 5' 8\" (1.727 m)   Wt 130 lb (59 kg)   SpO2 97%   BMI 19.77 kg/m²   General appearance: No apparent distress appears stated age and cooperative. HEENT Normal cephalic, atraumatic without obvious deformity. Pupils equal, round, and reactive to light. Extra ocular muscles intact. Conjunctivae/corneas clear. Neck: Supple, No jugular venous distention/bruits. Trachea midline without thyromegaly or adenopathy with full range of motion. Lungs: Diminished throughout with scattered rhonchi noted. No rales or wheezing noted. Good respiratory effort. Heart: tachycardiac and regular rhythm with Normal S1/S2 without murmurs, rubs or gallops, point of maximum impulse non-displaced  Abdomen: Soft, non-tender or non-distended without rigidity or guarding and positive bowel sounds all four quadrants. Extremities: No clubbing, cyanosis, or edema bilaterally. Full range of motion without deformity. Skin: Skin color, texture, turgor normal.  No rashes or lesions. Neurologic: Alert and oriented X 3, neurovascularly intact with sensory/motor intact upper extremities/lower extremities, bilaterally. Cranial nerves: II-XII intact, grossly non-focal.  Mental status: Alert, oriented, thought content appropriate. Capillary Refill: Acceptable  < 3 seconds  Peripheral Pulses: +3 Easily felt, not easily obliterated with pressure    CTPA:  I have reviewed the CT with the following interpretation:   1. No scan evidence for pulmonary embolus. 2. Enlarging pleural effusions with pulmonary edema. 3. Posttreatment changes again seen on the right. EKG:  I have reviewed the EKG with the following interpretation: Sinus tachycardia with no acute ischemic changes.      CBC   Recent Labs      12/17/18 2021   WBC  4.6   HGB  10.9*   HCT  34.2*   PLT  244      RENAL  Recent Labs      12/17/18 2021   NA  144   K  3.9   CL  106   CO2  19*

## 2018-12-18 NOTE — ED NOTES
Pt resting comfortably with no signs of distress. Denies any needs at this time. Bed locked and in lowest position with both side rails raise. Call light within reach.        Madai Katz RN  12/18/18 5501

## 2018-12-19 LAB
ANION GAP SERPL CALCULATED.3IONS-SCNC: 12 MMOL/L (ref 3–16)
BUN BLDV-MCNC: 20 MG/DL (ref 7–20)
CALCIUM SERPL-MCNC: 9.6 MG/DL (ref 8.3–10.6)
CHLORIDE BLD-SCNC: 107 MMOL/L (ref 99–110)
CO2: 27 MMOL/L (ref 21–32)
CREAT SERPL-MCNC: 1 MG/DL (ref 0.8–1.3)
GFR AFRICAN AMERICAN: >60
GFR NON-AFRICAN AMERICAN: >60
GLUCOSE BLD-MCNC: 103 MG/DL (ref 70–99)
GLUCOSE BLD-MCNC: 106 MG/DL (ref 70–99)
GLUCOSE BLD-MCNC: 178 MG/DL (ref 70–99)
GLUCOSE BLD-MCNC: 236 MG/DL (ref 70–99)
GLUCOSE BLD-MCNC: 73 MG/DL (ref 70–99)
GLUCOSE BLD-MCNC: 83 MG/DL (ref 70–99)
HCT VFR BLD CALC: 32.7 % (ref 40.5–52.5)
HEMOGLOBIN: 10.8 G/DL (ref 13.5–17.5)
MCH RBC QN AUTO: 29.5 PG (ref 26–34)
MCHC RBC AUTO-ENTMCNC: 33 G/DL (ref 31–36)
MCV RBC AUTO: 89.4 FL (ref 80–100)
PDW BLD-RTO: 14.6 % (ref 12.4–15.4)
PERFORMED ON: ABNORMAL
PERFORMED ON: NORMAL
PLATELET # BLD: 259 K/UL (ref 135–450)
PMV BLD AUTO: 7.9 FL (ref 5–10.5)
POTASSIUM REFLEX MAGNESIUM: 3.9 MMOL/L (ref 3.5–5.1)
RBC # BLD: 3.65 M/UL (ref 4.2–5.9)
SODIUM BLD-SCNC: 146 MMOL/L (ref 136–145)
WBC # BLD: 9 K/UL (ref 4–11)

## 2018-12-19 PROCEDURE — 6360000002 HC RX W HCPCS: Performed by: NURSE PRACTITIONER

## 2018-12-19 PROCEDURE — 94760 N-INVAS EAR/PLS OXIMETRY 1: CPT

## 2018-12-19 PROCEDURE — 6370000000 HC RX 637 (ALT 250 FOR IP): Performed by: INTERNAL MEDICINE

## 2018-12-19 PROCEDURE — 6370000000 HC RX 637 (ALT 250 FOR IP): Performed by: NURSE PRACTITIONER

## 2018-12-19 PROCEDURE — 80048 BASIC METABOLIC PNL TOTAL CA: CPT

## 2018-12-19 PROCEDURE — 2580000003 HC RX 258: Performed by: NURSE PRACTITIONER

## 2018-12-19 PROCEDURE — 94640 AIRWAY INHALATION TREATMENT: CPT

## 2018-12-19 PROCEDURE — 85027 COMPLETE CBC AUTOMATED: CPT

## 2018-12-19 PROCEDURE — 99233 SBSQ HOSP IP/OBS HIGH 50: CPT | Performed by: INTERNAL MEDICINE

## 2018-12-19 PROCEDURE — 99223 1ST HOSP IP/OBS HIGH 75: CPT | Performed by: INTERNAL MEDICINE

## 2018-12-19 PROCEDURE — 2060000000 HC ICU INTERMEDIATE R&B

## 2018-12-19 PROCEDURE — 2700000000 HC OXYGEN THERAPY PER DAY

## 2018-12-19 PROCEDURE — 36415 COLL VENOUS BLD VENIPUNCTURE: CPT

## 2018-12-19 PROCEDURE — 6360000002 HC RX W HCPCS: Performed by: INTERNAL MEDICINE

## 2018-12-19 RX ORDER — CARVEDILOL 3.12 MG/1
3.12 TABLET ORAL 2 TIMES DAILY WITH MEALS
Status: DISCONTINUED | OUTPATIENT
Start: 2018-12-19 | End: 2018-12-20 | Stop reason: HOSPADM

## 2018-12-19 RX ORDER — FUROSEMIDE 10 MG/ML
20 INJECTION INTRAMUSCULAR; INTRAVENOUS ONCE
Status: COMPLETED | OUTPATIENT
Start: 2018-12-19 | End: 2018-12-19

## 2018-12-19 RX ORDER — FUROSEMIDE 20 MG/1
20 TABLET ORAL DAILY
Status: DISCONTINUED | OUTPATIENT
Start: 2018-12-20 | End: 2018-12-20 | Stop reason: HOSPADM

## 2018-12-19 RX ADMIN — IPRATROPIUM BROMIDE AND ALBUTEROL SULFATE 1 AMPULE: .5; 3 SOLUTION RESPIRATORY (INHALATION) at 10:04

## 2018-12-19 RX ADMIN — PANTOPRAZOLE SODIUM 40 MG: 40 TABLET, DELAYED RELEASE ORAL at 09:28

## 2018-12-19 RX ADMIN — INSULIN LISPRO 3 UNITS: 100 INJECTION, SOLUTION INTRAVENOUS; SUBCUTANEOUS at 17:04

## 2018-12-19 RX ADMIN — INSULIN GLARGINE 20 UNITS: 100 INJECTION, SOLUTION SUBCUTANEOUS at 20:54

## 2018-12-19 RX ADMIN — ENOXAPARIN SODIUM 40 MG: 40 INJECTION SUBCUTANEOUS at 09:29

## 2018-12-19 RX ADMIN — AMLODIPINE BESYLATE 10 MG: 5 TABLET ORAL at 09:28

## 2018-12-19 RX ADMIN — Medication 2 PUFF: at 20:47

## 2018-12-19 RX ADMIN — ASPIRIN 81 MG: 81 TABLET, COATED ORAL at 09:28

## 2018-12-19 RX ADMIN — Medication 2 PUFF: at 10:03

## 2018-12-19 RX ADMIN — Medication 10 ML: at 20:54

## 2018-12-19 RX ADMIN — FUROSEMIDE 20 MG: 10 INJECTION, SOLUTION INTRAMUSCULAR; INTRAVENOUS at 17:05

## 2018-12-19 RX ADMIN — ROSUVASTATIN CALCIUM 40 MG: 10 TABLET, FILM COATED ORAL at 20:54

## 2018-12-19 RX ADMIN — Medication 10 ML: at 09:29

## 2018-12-19 RX ADMIN — CARVEDILOL 3.12 MG: 3.12 TABLET, FILM COATED ORAL at 17:05

## 2018-12-19 RX ADMIN — LISINOPRIL 20 MG: 20 TABLET ORAL at 09:28

## 2018-12-19 ASSESSMENT — PAIN SCALES - GENERAL
PAINLEVEL_OUTOF10: 0

## 2018-12-19 NOTE — PLAN OF CARE
Problem: HEMODYNAMIC STATUS  Goal: Patient has stable vital signs and fluid balance  BP is stable. HR remains elevated. Denied any symptoms. Problem: FLUID AND ELECTROLYTE IMBALANCE  Goal: Fluid and electrolyte balance are achieved/maintained  Outcome: Ongoing  U.O. has decreased significantly from the previous night shift which he was given IV Lasix. Lung sounds have improved in the left lung base. Right base continues to be diminished. Saturations are satisfactory on RA. Problem: Serum Glucose Level - Abnormal:  Goal: Ability to maintain appropriate glucose levels has stabilized  Ability to maintain appropriate glucose levels has stabilized   Outcome: Ongoing  Blood sugar has improved. No need for sliding scale coverage. Lantus given as ordered. Small snack provided. 0200 blood sugar remains WNL.

## 2018-12-19 NOTE — CONSULTS
injection 1 mg  1 mg Intramuscular PRN NIDIA Cruz CNP        dextrose 5 % solution  100 mL/hr Intravenous PRN NIDIA Cruz CNP        insulin lispro (HUMALOG) injection pen 0-18 Units  0-18 Units Subcutaneous TID WC Fabrizio Magallanes MD   Stopped at 12/19/18 1307    insulin lispro (HUMALOG) injection pen 0-9 Units  0-9 Units Subcutaneous Nightly Fabrizio Magallanes MD           Past Medical History:   Diagnosis Date    Allergic rhinitis, cause unspecified 7/15/2010    [facility] [address]     lung    Colon polyps     Diabetes mellitus ([facility]     Essential hypertension, benign 7/15/2010    HYPERCHOLESTERAEMIA     Hypertension     Lipoma of other specified sites 7/15/2010    Other abnormal blood chemistry 7/15/2010    Other and unspecified hyperlipidemia 7/15/2010    Other symptoms involving cardiovascular system 7/15/2010    Psychosexual dysfunction with inhibited sexual excitement 7/15/2010     Past Surgical History:   Procedure Laterality Date    BRONCHOSCOPY Right 04/27/2017    Dr. Buck Pastor - w/R BI stent placement size 12x3    BRONCHOSCOPY  04/24/2017    FAVIO Aguilar - dasia/EBUS    BRONCHOSCOPY  09/12/2017    COLONOSCOPY  10/31/2007    polyps    COLONOSCOPY  2012    polyp- repeat 2017    INGUINAL HERNIA REPAIR Right 1975     Family History   Problem Relation Age of Onset    Breast Cancer Mother      Social History     Social History    Marital status:      Spouse name: N/A    Number of children: N/A    Years of education: N/A     Occupational History    Not on file. Social History Main Topics    Smoking status: Former Smoker     Packs/day: 0.50     Years: 40.00     Types: Cigarettes     Start date: 7/29/1965     Quit date: 4/23/2017    Smokeless tobacco: Never Used      Comment:  To try gum or patch to start quiting classes     Alcohol use No    Drug use: No    Sexual activity: Not on file     Other Topics Concern    Not on file     Social History Narrative    No

## 2018-12-20 VITALS
DIASTOLIC BLOOD PRESSURE: 86 MMHG | TEMPERATURE: 97.9 F | HEART RATE: 96 BPM | HEIGHT: 68 IN | RESPIRATION RATE: 18 BRPM | WEIGHT: 125.3 LBS | SYSTOLIC BLOOD PRESSURE: 127 MMHG | BODY MASS INDEX: 18.99 KG/M2 | OXYGEN SATURATION: 97 %

## 2018-12-20 LAB
ANION GAP SERPL CALCULATED.3IONS-SCNC: 12 MMOL/L (ref 3–16)
BUN BLDV-MCNC: 20 MG/DL (ref 7–20)
CALCIUM SERPL-MCNC: 9.4 MG/DL (ref 8.3–10.6)
CHLORIDE BLD-SCNC: 101 MMOL/L (ref 99–110)
CO2: 29 MMOL/L (ref 21–32)
CREAT SERPL-MCNC: 1 MG/DL (ref 0.8–1.3)
GFR AFRICAN AMERICAN: >60
GFR NON-AFRICAN AMERICAN: >60
GLUCOSE BLD-MCNC: 116 MG/DL (ref 70–99)
GLUCOSE BLD-MCNC: 131 MG/DL (ref 70–99)
GLUCOSE BLD-MCNC: 141 MG/DL (ref 70–99)
GLUCOSE BLD-MCNC: 199 MG/DL (ref 70–99)
GLUCOSE BLD-MCNC: 47 MG/DL (ref 70–99)
GLUCOSE BLD-MCNC: 69 MG/DL (ref 70–99)
GLUCOSE BLD-MCNC: 81 MG/DL (ref 70–99)
PERFORMED ON: ABNORMAL
PERFORMED ON: NORMAL
POTASSIUM SERPL-SCNC: 4.9 MMOL/L (ref 3.5–5.1)
PRO-BNP: 3726 PG/ML (ref 0–124)
SODIUM BLD-SCNC: 142 MMOL/L (ref 136–145)

## 2018-12-20 PROCEDURE — 80048 BASIC METABOLIC PNL TOTAL CA: CPT

## 2018-12-20 PROCEDURE — 83880 ASSAY OF NATRIURETIC PEPTIDE: CPT

## 2018-12-20 PROCEDURE — 6370000000 HC RX 637 (ALT 250 FOR IP): Performed by: INTERNAL MEDICINE

## 2018-12-20 PROCEDURE — 94760 N-INVAS EAR/PLS OXIMETRY 1: CPT

## 2018-12-20 PROCEDURE — 6360000002 HC RX W HCPCS: Performed by: NURSE PRACTITIONER

## 2018-12-20 PROCEDURE — 6370000000 HC RX 637 (ALT 250 FOR IP): Performed by: NURSE PRACTITIONER

## 2018-12-20 PROCEDURE — 2580000003 HC RX 258: Performed by: NURSE PRACTITIONER

## 2018-12-20 PROCEDURE — 94640 AIRWAY INHALATION TREATMENT: CPT

## 2018-12-20 PROCEDURE — 99233 SBSQ HOSP IP/OBS HIGH 50: CPT | Performed by: INTERNAL MEDICINE

## 2018-12-20 PROCEDURE — 36415 COLL VENOUS BLD VENIPUNCTURE: CPT

## 2018-12-20 RX ORDER — FUROSEMIDE 20 MG/1
20 TABLET ORAL DAILY
Qty: 60 TABLET | Refills: 3 | Status: SHIPPED | OUTPATIENT
Start: 2018-12-21 | End: 2019-07-16 | Stop reason: SDUPTHER

## 2018-12-20 RX ORDER — CARVEDILOL 3.12 MG/1
3.12 TABLET ORAL 2 TIMES DAILY WITH MEALS
Qty: 60 TABLET | Refills: 3 | Status: SHIPPED | OUTPATIENT
Start: 2018-12-20 | End: 2019-01-28 | Stop reason: SDUPTHER

## 2018-12-20 RX ADMIN — Medication 10 ML: at 08:36

## 2018-12-20 RX ADMIN — AMLODIPINE BESYLATE 10 MG: 5 TABLET ORAL at 08:35

## 2018-12-20 RX ADMIN — ENOXAPARIN SODIUM 40 MG: 40 INJECTION SUBCUTANEOUS at 08:36

## 2018-12-20 RX ADMIN — ASPIRIN 81 MG: 81 TABLET, COATED ORAL at 08:36

## 2018-12-20 RX ADMIN — LISINOPRIL 20 MG: 20 TABLET ORAL at 08:36

## 2018-12-20 RX ADMIN — CARVEDILOL 3.12 MG: 3.12 TABLET, FILM COATED ORAL at 08:35

## 2018-12-20 RX ADMIN — PANTOPRAZOLE SODIUM 40 MG: 40 TABLET, DELAYED RELEASE ORAL at 06:38

## 2018-12-20 RX ADMIN — Medication 2 PUFF: at 08:04

## 2018-12-20 RX ADMIN — FUROSEMIDE 20 MG: 20 TABLET ORAL at 08:36

## 2018-12-20 ASSESSMENT — PAIN SCALES - GENERAL: PAINLEVEL_OUTOF10: 0

## 2018-12-20 NOTE — PLAN OF CARE
Problem: HEMODYNAMIC STATUS  Goal: Patient has stable vital signs and fluid balance  Outcome: Ongoing      Problem: FLUID AND ELECTROLYTE IMBALANCE  Goal: Fluid and electrolyte balance are achieved/maintained  Outcome: Ongoing None

## 2018-12-20 NOTE — PROGRESS NOTES
Arrived from ER A/O x 4. Denies any discomfort. Pulse ox 97% on RA. Reports feeling much better. Discussed plan of care. Oriented to room and call system. Finger stick 336. Patient is concerned and would like to have coverage. Message sent to hospitalist. Livia Vides to call if SOB returns. V.U. Aware of strict I/O.
P Pulmonary and Critical Care  F/U Note      Reason for Consult: SOB, pleural effusion  Requesting Physician: Jai Snyder CNP   Subjective:   CHIEF COMPLAINT / HPI:    Chief Complaint   Patient presents with    Shortness of Breath     Pt brought in by [facility] from home. Pt stated he was having trouble getting enough air and took 2 puffs of his inhaler. Hx of lung cancer. Pt feels better now.      He is on RA and improved with Lasix  Still with cough but no sputum production or fever             Current Medications:    Current Facility-Administered Medications: mometasone-formoterol (DULERA) 200-5 MCG/ACT inhaler 2 puff, 2 puff, Inhalation, BID **AND** MDI Treatment, , , BID  amLODIPine (NORVASC) tablet 10 mg, 10 mg, Oral, Daily  aspirin EC tablet 81 mg, 81 mg, Oral, Daily  fluticasone (FLONASE) 50 MCG/ACT nasal spray 1 spray, 1 spray, Each Nare, Daily  insulin glargine (LANTUS) injection pen 20 Units, 20 Units, Subcutaneous, Nightly  lisinopril (PRINIVIL;ZESTRIL) tablet 20 mg, 20 mg, Oral, Daily  pantoprazole (PROTONIX) tablet 40 mg, 40 mg, Oral, QAM AC  rosuvastatin (CRESTOR) tablet 40 mg, 40 mg, Oral, Nightly  ipratropium-albuterol (DUONEB) nebulizer solution 1 ampule, 1 ampule, Inhalation, Q4H PRN  sodium chloride flush 0.9 % injection 10 mL, 10 mL, Intravenous, 2 times per day  sodium chloride flush 0.9 % injection 10 mL, 10 mL, Intravenous, PRN  ondansetron (ZOFRAN) injection 4 mg, 4 mg, Intravenous, Q6H PRN  enoxaparin (LOVENOX) injection 40 mg, 40 mg, Subcutaneous, Daily  magnesium hydroxide (MILK OF MAGNESIA) 400 MG/5ML suspension 30 mL, 30 mL, Oral, Daily PRN  glucose (GLUTOSE) 40 % oral gel 15 g, 15 g, Oral, PRN  dextrose 50 % solution 12.5 g, 12.5 g, Intravenous, PRN  glucagon (rDNA) injection 1 mg, 1 mg, Intramuscular, PRN  dextrose 5 % solution, 100 mL/hr, Intravenous, PRN  insulin lispro (HUMALOG) injection pen 0-18 Units, 0-18 Units, Subcutaneous, TID WC  insulin lispro (HUMALOG) injection pen 0-9
Date    WBC 9.0 12/19/2018    HGB 10.8 (L) 12/19/2018    HCT 32.7 (L) 12/19/2018    MCV 89.4 12/19/2018     12/19/2018     Lab Results   Component Value Date    CREATININE 1.0 12/19/2018    BUN 20 12/19/2018     (H) 12/19/2018    K 3.9 12/19/2018     12/19/2018    CO2 27 12/19/2018     Lab Results   Component Value Date    INR 0.94 04/11/2018    PROTIME 10.6 04/11/2018        Physical Examination:    /86   Pulse 96   Temp 97.9 °F (36.6 °C) (Temporal)   Resp 18   Ht 5' 8\" (1.727 m)   Wt 125 lb 4.8 oz (56.8 kg)   SpO2 97%   BMI 19.05 kg/m²      WD/WN  HEENT:  NC/AT  Respiratory:  · Resp Assessment: Normal respiratory effort  · Resp Auscultation: Clear to auscultation bilaterally   Cardiovascular:  · Auscultation: regular rate and rhythm, normal S1S2, no murmur, rub or gallop  · Palpation:  Nl PMI  · JVP:  normal  · Extremities: No Edema  Abdomen:  · Soft, non-tender  · Normal bowel sounds  Extremities:  ·  No Cyanosis or Clubbing  Neurological/Psychiatric:  · Oriented to time, place, and person  · Non-anxious  Skin Warm and dry    Lab Results   Component Value Date     12/19/2018     12/17/2018     09/06/2018    K 3.9 12/19/2018    K 3.9 12/17/2018    K 4.3 09/06/2018    K 4.6 07/02/2018    K 3.9 04/13/2018    BUN 20 12/19/2018    BUN 12 12/17/2018    BUN 12 09/06/2018    CREATININE 1.0 12/19/2018    CREATININE 1.1 12/17/2018    CREATININE 0.8 09/06/2018    GLUCOSE 83 12/19/2018    GLUCOSE 266 12/17/2018    GLUCOSE 150 06/29/2017    GLUCOSE 184 06/22/2017     Lab Results   Component Value Date    PROBNP 2,529 (H) 12/17/2018    PROBNP 130 (H) 04/11/2018     Lab Results   Component Value Date    ALT 16 12/17/2018    ALT 12 09/06/2018    AST 36 12/17/2018    AST 16 09/06/2018     Lab Results   Component Value Date    HGB 10.8 12/19/2018    HGB 10.9 12/17/2018    HCT 32.7 12/19/2018    HCT 34.2 12/17/2018     12/19/2018     12/17/2018     Lab Results

## 2018-12-20 NOTE — CONSULTS
Heidi 1950    History:  Past Medical History:   Diagnosis Date    Allergic rhinitis, cause unspecified 7/15/2010    Cancer ([facility] [address]     lung    Colon polyps     Diabetes mellitus ([facility] [address]     Essential hypertension, benign 7/15/2010    HYPERCHOLESTERAEMIA     Hypertension     Lipoma of other specified sites 7/15/2010    Other abnormal blood chemistry 7/15/2010    Other and unspecified hyperlipidemia 7/15/2010    Other symptoms involving cardiovascular system 7/15/2010    Psychosexual dysfunction with inhibited sexual excitement 7/15/2010       ECHO:     Conclusions      Summary   -Technically difficult examination. Lung Cancer.   -Normal left ventricular cavity size and wall thickness.   -Moderately reduced systolic function with an ejection fraction of 40-45%.  -No obvious regional wall motion abnormalities noted.   -Normal diastolic function.   - Mild mitral regurgitation.   -There is sclerotic nodular thickening of the aortic valve cusps.  -Thickening on the tricuspid annulus.   -Trivial tricuspid regurgitation with a RVSP of 29 mmHg.      Signature      ------------------------------------------------------------------   Electronically signed by Jose J Rubio MD (Interpreting   [email or web address]) on 12/18/2018 at 03:11 PM   ------------------------------------------------------------------       ACE/ARB: .Lisinopril 20 mg daily  BB: Carvedilol 3.125 mg bid  Aldosterone Antagonist: no aldosterone antagonist at this time. Last Hospital Admission: 4/12/2018 for chest pain  Code Status: Full   Discharge plans: Patient to return home. Lives independently with wife    Family Present: Wife and daughter    Mr. Yogesh Orr was seen for a new diagnosis of congestive heart failure which is new for him. This was explained to him as well as possible causes. He was instructed to weigh daily and to call according to parameters for weight loss or weight gain. and knowing when and who to call  5. Provided patient with CHF Resource Line for questions and concerns.    6. Patient would make an excellent candidate for group    Gamal Number 12/20/2018 3:48 PM

## 2018-12-20 NOTE — PLAN OF CARE
Problem: HEMODYNAMIC STATUS  Goal: Patient has stable vital signs and fluid balance  Outcome: Ongoing  VSS. Lung sounds are clear. Lab work WNL.

## 2018-12-21 ENCOUNTER — CARE COORDINATION (OUTPATIENT)
Dept: CASE MANAGEMENT | Age: 68
End: 2018-12-21

## 2018-12-21 DIAGNOSIS — J90 PLEURAL EFFUSION, BILATERAL: Primary | ICD-10-CM

## 2018-12-21 PROCEDURE — 1111F DSCHRG MED/CURRENT MED MERGE: CPT | Performed by: INTERNAL MEDICINE

## 2018-12-22 LAB
BLOOD CULTURE, ROUTINE: NORMAL
CULTURE, BLOOD 2: NORMAL

## 2018-12-27 ENCOUNTER — OFFICE VISIT (OUTPATIENT)
Dept: CARDIOLOGY CLINIC | Age: 68
End: 2018-12-27
Payer: COMMERCIAL

## 2018-12-27 VITALS
OXYGEN SATURATION: 100 % | WEIGHT: 120.6 LBS | SYSTOLIC BLOOD PRESSURE: 120 MMHG | RESPIRATION RATE: 15 BRPM | DIASTOLIC BLOOD PRESSURE: 64 MMHG | HEIGHT: 68 IN | BODY MASS INDEX: 18.28 KG/M2 | HEART RATE: 87 BPM

## 2018-12-27 DIAGNOSIS — Z85.118 HISTORY OF LUNG CANCER: ICD-10-CM

## 2018-12-27 DIAGNOSIS — I50.22 CHRONIC SYSTOLIC HEART FAILURE (HCC): Primary | ICD-10-CM

## 2018-12-27 DIAGNOSIS — I10 ESSENTIAL HYPERTENSION: ICD-10-CM

## 2018-12-27 DIAGNOSIS — I42.9 CARDIOMYOPATHY, UNSPECIFIED TYPE (HCC): ICD-10-CM

## 2018-12-27 PROCEDURE — 99214 OFFICE O/P EST MOD 30 MIN: CPT | Performed by: NURSE PRACTITIONER

## 2018-12-27 NOTE — PATIENT INSTRUCTIONS
1. Continue current medications. 2. Check BNP next week along with PCP labs. 3. Continue to limit salt in diet. 4. Continue to weigh daily. Report to office weight gain/loss 3 lbs overnight or 5 lbs in a week. 5. Follow up in 4 weeks, earlier for worsening.

## 2019-01-03 ENCOUNTER — TELEPHONE (OUTPATIENT)
Dept: INTERNAL MEDICINE CLINIC | Age: 69
End: 2019-01-03

## 2019-01-04 ENCOUNTER — CARE COORDINATION (OUTPATIENT)
Dept: CASE MANAGEMENT | Age: 69
End: 2019-01-04

## 2019-01-09 RX ORDER — LISINOPRIL 40 MG/1
TABLET ORAL
Qty: 90 TABLET | Refills: 2 | Status: SHIPPED | OUTPATIENT
Start: 2019-01-09 | End: 2019-10-07 | Stop reason: SDUPTHER

## 2019-01-11 ENCOUNTER — HOSPITAL ENCOUNTER (OUTPATIENT)
Age: 69
Discharge: HOME | End: 2019-01-11
Payer: COMMERCIAL

## 2019-01-11 DIAGNOSIS — I10 ESSENTIAL HYPERTENSION: ICD-10-CM

## 2019-01-11 DIAGNOSIS — J41.0 SIMPLE CHRONIC BRONCHITIS (HCC): ICD-10-CM

## 2019-01-11 DIAGNOSIS — J44.9 COPD, MODERATE (HCC): ICD-10-CM

## 2019-01-11 DIAGNOSIS — C34.01 MALIGNANT NEOPLASM OF RIGHT MAIN BRONCHUS (HCC): ICD-10-CM

## 2019-01-11 DIAGNOSIS — E11.9 TYPE 2 DIABETES MELLITUS WITHOUT COMPLICATION, WITHOUT LONG-TERM CURRENT USE OF INSULIN (HCC): ICD-10-CM

## 2019-01-11 DIAGNOSIS — E78.2 MIXED HYPERLIPIDEMIA: ICD-10-CM

## 2019-01-11 DIAGNOSIS — M1A.0790 IDIOPATHIC CHRONIC GOUT OF FOOT WITHOUT TOPHUS, UNSPECIFIED LATERALITY: ICD-10-CM

## 2019-01-11 DIAGNOSIS — K21.9 GASTROESOPHAGEAL REFLUX DISEASE WITHOUT ESOPHAGITIS: ICD-10-CM

## 2019-01-11 DIAGNOSIS — J30.2 SEASONAL ALLERGIC RHINITIS, UNSPECIFIED TRIGGER: Chronic | ICD-10-CM

## 2019-01-11 LAB
A/G RATIO: 1.3 (ref 1.1–2.2)
ALBUMIN SERPL-MCNC: 4.5 G/DL (ref 3.4–5)
ALP BLD-CCNC: 146 U/L (ref 40–129)
ALT SERPL-CCNC: 13 U/L (ref 10–40)
ANION GAP SERPL CALCULATED.3IONS-SCNC: 18 MMOL/L (ref 3–16)
AST SERPL-CCNC: 16 U/L (ref 15–37)
BASOPHILS ABSOLUTE: 0.1 K/UL (ref 0–0.2)
BASOPHILS RELATIVE PERCENT: 1 %
BILIRUB SERPL-MCNC: <0.2 MG/DL (ref 0–1)
BUN BLDV-MCNC: 18 MG/DL (ref 7–20)
CALCIUM SERPL-MCNC: 9.9 MG/DL (ref 8.3–10.6)
CHLORIDE BLD-SCNC: 100 MMOL/L (ref 99–110)
CHOLESTEROL, TOTAL: 167 MG/DL (ref 0–199)
CO2: 25 MMOL/L (ref 21–32)
CREAT SERPL-MCNC: 0.9 MG/DL (ref 0.8–1.3)
EOSINOPHILS ABSOLUTE: 0.3 K/UL (ref 0–0.6)
EOSINOPHILS RELATIVE PERCENT: 4.5 %
ESTIMATED AVERAGE GLUCOSE: 185.8 MG/DL
GFR AFRICAN AMERICAN: >60
GFR NON-AFRICAN AMERICAN: >60
GLOBULIN: 3.5 G/DL
GLUCOSE BLD-MCNC: 148 MG/DL (ref 70–99)
HBA1C MFR BLD: 8.1 %
HCT VFR BLD CALC: 35.5 % (ref 40.5–52.5)
HDLC SERPL-MCNC: 53 MG/DL (ref 40–60)
HEMOGLOBIN: 11.6 G/DL (ref 13.5–17.5)
LDL CHOLESTEROL CALCULATED: 97 MG/DL
LYMPHOCYTES ABSOLUTE: 1.1 K/UL (ref 1–5.1)
LYMPHOCYTES RELATIVE PERCENT: 15.5 %
MCH RBC QN AUTO: 28.4 PG (ref 26–34)
MCHC RBC AUTO-ENTMCNC: 32.7 G/DL (ref 31–36)
MCV RBC AUTO: 86.8 FL (ref 80–100)
MONOCYTES ABSOLUTE: 0.6 K/UL (ref 0–1.3)
MONOCYTES RELATIVE PERCENT: 8.7 %
NEUTROPHILS ABSOLUTE: 4.9 K/UL (ref 1.7–7.7)
NEUTROPHILS RELATIVE PERCENT: 70.3 %
PDW BLD-RTO: 14 % (ref 12.4–15.4)
PLATELET # BLD: 239 K/UL (ref 135–450)
PMV BLD AUTO: 8 FL (ref 5–10.5)
POTASSIUM SERPL-SCNC: 5.1 MMOL/L (ref 3.5–5.1)
PRO-BNP: 1123 PG/ML (ref 0–124)
RBC # BLD: 4.09 M/UL (ref 4.2–5.9)
SODIUM BLD-SCNC: 143 MMOL/L (ref 136–145)
TOTAL PROTEIN: 8 G/DL (ref 6.4–8.2)
TRIGL SERPL-MCNC: 87 MG/DL (ref 0–150)
TSH REFLEX: 2.05 UIU/ML (ref 0.27–4.2)
VLDLC SERPL CALC-MCNC: 17 MG/DL
WBC # BLD: 7 K/UL (ref 4–11)

## 2019-01-11 PROCEDURE — 80061 LIPID PANEL: CPT

## 2019-01-11 PROCEDURE — 83880 ASSAY OF NATRIURETIC PEPTIDE: CPT

## 2019-01-11 PROCEDURE — 80053 COMPREHEN METABOLIC PANEL: CPT

## 2019-01-11 PROCEDURE — 85025 COMPLETE CBC W/AUTO DIFF WBC: CPT

## 2019-01-11 PROCEDURE — 84443 ASSAY THYROID STIM HORMONE: CPT

## 2019-01-11 PROCEDURE — 36415 COLL VENOUS BLD VENIPUNCTURE: CPT

## 2019-01-11 PROCEDURE — 83036 HEMOGLOBIN GLYCOSYLATED A1C: CPT

## 2019-01-21 ENCOUNTER — TELEPHONE (OUTPATIENT)
Dept: CASE MANAGEMENT | Age: 69
End: 2019-01-21

## 2019-01-22 ENCOUNTER — OFFICE VISIT (OUTPATIENT)
Dept: PULMONOLOGY | Age: 69
End: 2019-01-22
Payer: COMMERCIAL

## 2019-01-22 VITALS
BODY MASS INDEX: 18.94 KG/M2 | OXYGEN SATURATION: 99 % | RESPIRATION RATE: 16 BRPM | HEART RATE: 99 BPM | DIASTOLIC BLOOD PRESSURE: 80 MMHG | HEIGHT: 68 IN | SYSTOLIC BLOOD PRESSURE: 130 MMHG | WEIGHT: 125 LBS

## 2019-01-22 DIAGNOSIS — C34.91 SQUAMOUS CELL CARCINOMA OF RIGHT LUNG (HCC): Primary | ICD-10-CM

## 2019-01-22 DIAGNOSIS — C34.11 MALIGNANT NEOPLASM OF UPPER LOBE OF RIGHT LUNG (HCC): ICD-10-CM

## 2019-01-22 DIAGNOSIS — J44.9 COPD, MODERATE (HCC): ICD-10-CM

## 2019-01-22 PROCEDURE — 99213 OFFICE O/P EST LOW 20 MIN: CPT | Performed by: INTERNAL MEDICINE

## 2019-01-23 ENCOUNTER — TELEPHONE (OUTPATIENT)
Dept: CASE MANAGEMENT | Age: 69
End: 2019-01-23

## 2019-01-28 ENCOUNTER — OFFICE VISIT (OUTPATIENT)
Dept: CARDIOLOGY CLINIC | Age: 69
End: 2019-01-28
Payer: COMMERCIAL

## 2019-01-28 VITALS
WEIGHT: 127.3 LBS | HEART RATE: 89 BPM | DIASTOLIC BLOOD PRESSURE: 70 MMHG | RESPIRATION RATE: 16 BRPM | BODY MASS INDEX: 19.29 KG/M2 | HEIGHT: 68 IN | OXYGEN SATURATION: 100 % | SYSTOLIC BLOOD PRESSURE: 142 MMHG

## 2019-01-28 DIAGNOSIS — I50.22 CHRONIC SYSTOLIC HEART FAILURE (HCC): Primary | ICD-10-CM

## 2019-01-28 DIAGNOSIS — I10 ESSENTIAL HYPERTENSION: ICD-10-CM

## 2019-01-28 DIAGNOSIS — I42.9 CARDIOMYOPATHY, UNSPECIFIED TYPE (HCC): ICD-10-CM

## 2019-01-28 DIAGNOSIS — Z85.118 HISTORY OF LUNG CANCER: ICD-10-CM

## 2019-01-28 PROCEDURE — 99214 OFFICE O/P EST MOD 30 MIN: CPT | Performed by: NURSE PRACTITIONER

## 2019-01-28 RX ORDER — CARVEDILOL 3.12 MG/1
6.25 TABLET ORAL 2 TIMES DAILY WITH MEALS
Qty: 60 TABLET | Refills: 3
Start: 2019-01-28 | End: 2019-04-03 | Stop reason: SDUPTHER

## 2019-02-26 ENCOUNTER — OFFICE VISIT (OUTPATIENT)
Dept: CARDIOLOGY CLINIC | Age: 69
End: 2019-02-26
Payer: COMMERCIAL

## 2019-02-26 VITALS
RESPIRATION RATE: 16 BRPM | HEIGHT: 68 IN | HEART RATE: 96 BPM | DIASTOLIC BLOOD PRESSURE: 84 MMHG | BODY MASS INDEX: 19.82 KG/M2 | OXYGEN SATURATION: 100 % | SYSTOLIC BLOOD PRESSURE: 154 MMHG | WEIGHT: 130.8 LBS

## 2019-02-26 DIAGNOSIS — Z85.118 HISTORY OF LUNG CANCER: ICD-10-CM

## 2019-02-26 DIAGNOSIS — I10 ESSENTIAL HYPERTENSION: ICD-10-CM

## 2019-02-26 DIAGNOSIS — I50.22 CHRONIC SYSTOLIC HEART FAILURE (HCC): Primary | ICD-10-CM

## 2019-02-26 DIAGNOSIS — I42.9 CARDIOMYOPATHY, UNSPECIFIED TYPE (HCC): ICD-10-CM

## 2019-02-26 PROCEDURE — 99214 OFFICE O/P EST MOD 30 MIN: CPT | Performed by: NURSE PRACTITIONER

## 2019-03-08 ENCOUNTER — OFFICE VISIT (OUTPATIENT)
Dept: INTERNAL MEDICINE CLINIC | Age: 69
End: 2019-03-08
Payer: COMMERCIAL

## 2019-03-08 VITALS
DIASTOLIC BLOOD PRESSURE: 82 MMHG | OXYGEN SATURATION: 98 % | HEART RATE: 96 BPM | RESPIRATION RATE: 18 BRPM | SYSTOLIC BLOOD PRESSURE: 138 MMHG | BODY MASS INDEX: 19.52 KG/M2 | HEIGHT: 68 IN | WEIGHT: 128.8 LBS

## 2019-03-08 DIAGNOSIS — E11.9 TYPE 2 DIABETES MELLITUS WITHOUT COMPLICATION, WITHOUT LONG-TERM CURRENT USE OF INSULIN (HCC): Primary | ICD-10-CM

## 2019-03-08 DIAGNOSIS — J90 PLEURAL EFFUSION, BILATERAL: ICD-10-CM

## 2019-03-08 DIAGNOSIS — I10 ESSENTIAL HYPERTENSION: ICD-10-CM

## 2019-03-08 DIAGNOSIS — Z12.11 ENCOUNTER FOR SCREENING COLONOSCOPY: ICD-10-CM

## 2019-03-08 DIAGNOSIS — G25.81 RLS (RESTLESS LEGS SYNDROME): ICD-10-CM

## 2019-03-08 DIAGNOSIS — E55.9 VITAMIN D DEFICIENCY: ICD-10-CM

## 2019-03-08 DIAGNOSIS — M1A.0490 IDIOPATHIC CHRONIC GOUT OF HAND WITHOUT TOPHUS, UNSPECIFIED LATERALITY: ICD-10-CM

## 2019-03-08 DIAGNOSIS — E78.2 MIXED HYPERLIPIDEMIA: ICD-10-CM

## 2019-03-08 DIAGNOSIS — I50.21 ACUTE SYSTOLIC (CONGESTIVE) HEART FAILURE (HCC): ICD-10-CM

## 2019-03-08 DIAGNOSIS — J44.9 COPD, MODERATE (HCC): ICD-10-CM

## 2019-03-08 DIAGNOSIS — C34.91 SQUAMOUS CELL CARCINOMA OF RIGHT LUNG (HCC): ICD-10-CM

## 2019-03-08 DIAGNOSIS — C34.2 PRIMARY CANCER OF RIGHT MIDDLE LOBE OF LUNG (HCC): ICD-10-CM

## 2019-03-08 PROCEDURE — 99214 OFFICE O/P EST MOD 30 MIN: CPT | Performed by: INTERNAL MEDICINE

## 2019-03-08 ASSESSMENT — ENCOUNTER SYMPTOMS
NAUSEA: 0
ANAL BLEEDING: 0
RHINORRHEA: 1
COUGH: 1
DIARRHEA: 1
EYES NEGATIVE: 1
ALLERGIC/IMMUNOLOGIC NEGATIVE: 1

## 2019-03-08 ASSESSMENT — PATIENT HEALTH QUESTIONNAIRE - PHQ9
SUM OF ALL RESPONSES TO PHQ QUESTIONS 1-9: 0
SUM OF ALL RESPONSES TO PHQ QUESTIONS 1-9: 0
2. FEELING DOWN, DEPRESSED OR HOPELESS: 0
1. LITTLE INTEREST OR PLEASURE IN DOING THINGS: 0
SUM OF ALL RESPONSES TO PHQ9 QUESTIONS 1 & 2: 0

## 2019-03-13 ENCOUNTER — HOSPITAL ENCOUNTER (OUTPATIENT)
Age: 69
Discharge: HOME | End: 2019-03-13
Payer: COMMERCIAL

## 2019-03-13 DIAGNOSIS — E55.9 VITAMIN D DEFICIENCY: ICD-10-CM

## 2019-03-13 DIAGNOSIS — C34.2 PRIMARY CANCER OF RIGHT MIDDLE LOBE OF LUNG (HCC): ICD-10-CM

## 2019-03-13 DIAGNOSIS — I10 ESSENTIAL HYPERTENSION: ICD-10-CM

## 2019-03-13 DIAGNOSIS — M1A.0790 IDIOPATHIC CHRONIC GOUT OF FOOT WITHOUT TOPHUS, UNSPECIFIED LATERALITY: ICD-10-CM

## 2019-03-13 DIAGNOSIS — M1A.0490 IDIOPATHIC CHRONIC GOUT OF HAND WITHOUT TOPHUS, UNSPECIFIED LATERALITY: ICD-10-CM

## 2019-03-13 DIAGNOSIS — E78.2 MIXED HYPERLIPIDEMIA: ICD-10-CM

## 2019-03-13 DIAGNOSIS — G25.81 RLS (RESTLESS LEGS SYNDROME): ICD-10-CM

## 2019-03-13 DIAGNOSIS — J90 PLEURAL EFFUSION, BILATERAL: ICD-10-CM

## 2019-03-13 DIAGNOSIS — E11.9 TYPE 2 DIABETES MELLITUS WITHOUT COMPLICATION, WITHOUT LONG-TERM CURRENT USE OF INSULIN (HCC): ICD-10-CM

## 2019-03-13 DIAGNOSIS — C34.91 SQUAMOUS CELL CARCINOMA OF RIGHT LUNG (HCC): ICD-10-CM

## 2019-03-13 DIAGNOSIS — J44.9 COPD, MODERATE (HCC): ICD-10-CM

## 2019-03-13 DIAGNOSIS — I50.21 ACUTE SYSTOLIC (CONGESTIVE) HEART FAILURE (HCC): ICD-10-CM

## 2019-03-13 LAB
A/G RATIO: 1.3 (ref 1.1–2.2)
ALBUMIN SERPL-MCNC: 4.9 G/DL (ref 3.4–5)
ALP BLD-CCNC: 166 U/L (ref 40–129)
ALT SERPL-CCNC: 10 U/L (ref 10–40)
ANION GAP SERPL CALCULATED.3IONS-SCNC: 18 MMOL/L (ref 3–16)
AST SERPL-CCNC: 16 U/L (ref 15–37)
BASOPHILS ABSOLUTE: 0.1 K/UL (ref 0–0.2)
BASOPHILS RELATIVE PERCENT: 1.2 %
BILIRUB SERPL-MCNC: 0.3 MG/DL (ref 0–1)
BUN BLDV-MCNC: 16 MG/DL (ref 7–20)
CALCIUM SERPL-MCNC: 10.2 MG/DL (ref 8.3–10.6)
CHLORIDE BLD-SCNC: 98 MMOL/L (ref 99–110)
CHOLESTEROL, TOTAL: 153 MG/DL (ref 0–199)
CO2: 25 MMOL/L (ref 21–32)
CREAT SERPL-MCNC: 0.9 MG/DL (ref 0.8–1.3)
EOSINOPHILS ABSOLUTE: 0.2 K/UL (ref 0–0.6)
EOSINOPHILS RELATIVE PERCENT: 2.3 %
ESTIMATED AVERAGE GLUCOSE: 194.4 MG/DL
GFR AFRICAN AMERICAN: >60
GFR NON-AFRICAN AMERICAN: >60
GLOBULIN: 3.8 G/DL
GLUCOSE BLD-MCNC: 154 MG/DL (ref 70–99)
HBA1C MFR BLD: 8.4 %
HCT VFR BLD CALC: 36.1 % (ref 40.5–52.5)
HDLC SERPL-MCNC: 48 MG/DL (ref 40–60)
HEMOGLOBIN: 12 G/DL (ref 13.5–17.5)
LDL CHOLESTEROL CALCULATED: 77 MG/DL
LYMPHOCYTES ABSOLUTE: 1.2 K/UL (ref 1–5.1)
LYMPHOCYTES RELATIVE PERCENT: 14.4 %
MCH RBC QN AUTO: 29.1 PG (ref 26–34)
MCHC RBC AUTO-ENTMCNC: 33.1 G/DL (ref 31–36)
MCV RBC AUTO: 87.7 FL (ref 80–100)
MONOCYTES ABSOLUTE: 0.6 K/UL (ref 0–1.3)
MONOCYTES RELATIVE PERCENT: 7.8 %
NEUTROPHILS ABSOLUTE: 6.1 K/UL (ref 1.7–7.7)
NEUTROPHILS RELATIVE PERCENT: 74.3 %
PDW BLD-RTO: 15.8 % (ref 12.4–15.4)
PLATELET # BLD: 276 K/UL (ref 135–450)
PMV BLD AUTO: 8.1 FL (ref 5–10.5)
POTASSIUM SERPL-SCNC: 4.4 MMOL/L (ref 3.5–5.1)
PRO-BNP: 575 PG/ML (ref 0–124)
RBC # BLD: 4.12 M/UL (ref 4.2–5.9)
SODIUM BLD-SCNC: 141 MMOL/L (ref 136–145)
TOTAL PROTEIN: 8.7 G/DL (ref 6.4–8.2)
TRIGL SERPL-MCNC: 139 MG/DL (ref 0–150)
TSH REFLEX: 2.6 UIU/ML (ref 0.27–4.2)
URIC ACID, SERUM: 6.8 MG/DL (ref 3.5–7.2)
VLDLC SERPL CALC-MCNC: 28 MG/DL
WBC # BLD: 8.2 K/UL (ref 4–11)

## 2019-03-13 PROCEDURE — 80053 COMPREHEN METABOLIC PANEL: CPT

## 2019-03-13 PROCEDURE — 85025 COMPLETE CBC W/AUTO DIFF WBC: CPT

## 2019-03-13 PROCEDURE — 83036 HEMOGLOBIN GLYCOSYLATED A1C: CPT

## 2019-03-13 PROCEDURE — 83880 ASSAY OF NATRIURETIC PEPTIDE: CPT

## 2019-03-13 PROCEDURE — 84550 ASSAY OF BLOOD/URIC ACID: CPT

## 2019-03-13 PROCEDURE — 80061 LIPID PANEL: CPT

## 2019-03-13 PROCEDURE — 36415 COLL VENOUS BLD VENIPUNCTURE: CPT

## 2019-03-13 PROCEDURE — 84443 ASSAY THYROID STIM HORMONE: CPT

## 2019-03-20 ENCOUNTER — OFFICE VISIT (OUTPATIENT)
Dept: PULMONOLOGY | Age: 69
End: 2019-03-20
Payer: COMMERCIAL

## 2019-03-20 ENCOUNTER — TELEPHONE (OUTPATIENT)
Dept: CASE MANAGEMENT | Age: 69
End: 2019-03-20

## 2019-03-20 ENCOUNTER — HOSPITAL ENCOUNTER (OUTPATIENT)
Dept: CT IMAGING | Age: 69
Discharge: HOME | End: 2019-03-20
Payer: COMMERCIAL

## 2019-03-20 VITALS
OXYGEN SATURATION: 100 % | DIASTOLIC BLOOD PRESSURE: 90 MMHG | BODY MASS INDEX: 19.55 KG/M2 | HEART RATE: 95 BPM | HEIGHT: 68 IN | SYSTOLIC BLOOD PRESSURE: 140 MMHG | WEIGHT: 129 LBS

## 2019-03-20 DIAGNOSIS — C34.81 MALIGNANT NEOPLASM OF OVERLAPPING SITES OF RIGHT LUNG (HCC): Primary | ICD-10-CM

## 2019-03-20 DIAGNOSIS — C34.91 SQUAMOUS CELL CARCINOMA OF RIGHT LUNG (HCC): ICD-10-CM

## 2019-03-20 DIAGNOSIS — I50.22 CHRONIC SYSTOLIC CONGESTIVE HEART FAILURE (HCC): ICD-10-CM

## 2019-03-20 DIAGNOSIS — J44.9 COPD, MODERATE (HCC): ICD-10-CM

## 2019-03-20 PROCEDURE — 99213 OFFICE O/P EST LOW 20 MIN: CPT | Performed by: INTERNAL MEDICINE

## 2019-03-20 PROCEDURE — 71250 CT THORAX DX C-: CPT

## 2019-03-25 RX ORDER — ROSUVASTATIN CALCIUM 40 MG/1
40 TABLET, COATED ORAL EVERY EVENING
Qty: 90 TABLET | Refills: 1 | Status: SHIPPED | OUTPATIENT
Start: 2019-03-25 | End: 2020-09-24 | Stop reason: SDUPTHER

## 2019-04-03 ENCOUNTER — TELEPHONE (OUTPATIENT)
Dept: CARDIOLOGY CLINIC | Age: 69
End: 2019-04-03

## 2019-04-03 RX ORDER — CARVEDILOL 12.5 MG/1
12.5 TABLET ORAL 2 TIMES DAILY WITH MEALS
Qty: 60 TABLET | Refills: 3 | Status: SHIPPED | OUTPATIENT
Start: 2019-04-03 | End: 2019-07-25 | Stop reason: SDUPTHER

## 2019-04-03 NOTE — TELEPHONE ENCOUNTER
Notify patient I sent in prescription for 12.5 mg dose, he will take 1 tablet BID. Please confirm that this is dose he was taking. Thanks.

## 2019-04-03 NOTE — TELEPHONE ENCOUNTER
Pt calling to let [facility] know that he took his last carvedilol this morning. Pls call to advise.

## 2019-04-26 ENCOUNTER — OFFICE VISIT (OUTPATIENT)
Dept: CARDIOLOGY CLINIC | Age: 69
End: 2019-04-26
Payer: COMMERCIAL

## 2019-04-26 VITALS
SYSTOLIC BLOOD PRESSURE: 122 MMHG | DIASTOLIC BLOOD PRESSURE: 80 MMHG | OXYGEN SATURATION: 100 % | BODY MASS INDEX: 19.85 KG/M2 | HEIGHT: 68 IN | WEIGHT: 131 LBS | HEART RATE: 68 BPM

## 2019-04-26 DIAGNOSIS — I10 ESSENTIAL HYPERTENSION: ICD-10-CM

## 2019-04-26 DIAGNOSIS — I50.22 CHRONIC SYSTOLIC HEART FAILURE (HCC): Primary | ICD-10-CM

## 2019-04-26 DIAGNOSIS — I42.9 CARDIOMYOPATHY, UNSPECIFIED TYPE (HCC): ICD-10-CM

## 2019-04-26 PROCEDURE — 99213 OFFICE O/P EST LOW 20 MIN: CPT | Performed by: NURSE PRACTITIONER

## 2019-04-26 NOTE — PATIENT INSTRUCTIONS
1. Continue current medications. 2. Labs as per PCP. 3. Follow up in 6 months, earlier for worsening.

## 2019-04-26 NOTE — PROGRESS NOTES
REPAIR Right      Social History     Tobacco Use    Smoking status: Former Smoker     Packs/day: 0.50     Years: 40.00     Pack years: 20.00     Types: Cigarettes     Start date: 1965     Last attempt to quit: 2017     Years since quittin.0    Smokeless tobacco: Never Used    Tobacco comment: To try gum or patch to start quiting classes    Substance Use Topics    Alcohol use: No       Review of Systems:   Constitutional: No significant change in weight, no fatigue  HEENT: No change in vision or ringing in the ears. Respiratory: No RADFORD, PND, orthopnea or cough. Cardiovascular: No chest pain, palpitations, edema. GI: No n/v, abdominal pain or changes in bowel habits. No melena, no hematochezia  : No dysuria or hematuria. Skin: No rash or new skin lesions. Musculoskeletal: No new muscle or joint pain. Neurological: No lightheadedness, dizziness, syncope or TIA-like symptoms. Psychiatric: No anxiety, insomnia or depression    Physical Exam:  /80   Pulse 68   Ht 5' 8\" (1.727 m)   Wt 131 lb (59.4 kg)   SpO2 100%   BMI 19.92 kg/m²     General:  Awake, alert, oriented in NAD  Skin:  Warm and dry. No unusual bruising or rash  HEENT: Normocephalic and atraumatic. Oral mucosa moist, no cyanosis. Neck:  Supple. No JVP appreciated  Chest:  Normal effort.   Clear to auscultation  Cardiovascular:  RRR, S1/S2, no murmur/gallop/rub  Abdomen:  Soft, nontender, +bowel sounds  Extremities:  No edema  Neurological: No focal deficits  Psychological: Normal mood and affect    Home Medications:  Current Outpatient Medications   Medication Sig Dispense Refill    carvedilol (COREG) 12.5 MG tablet Take 1 tablet by mouth 2 times daily (with meals) 60 tablet 3    rosuvastatin (CRESTOR) 40 MG tablet Take 1 tablet by mouth every evening 90 tablet 1    insulin glargine (LANTUS) 100 UNIT/ML injection pen Inject 20 Units into the skin nightly Indications: 18-20 units at hs 18 mL 3    lisinopril ejection fraction of 55%.   -No obvious regional wall motion abnormalities noted. -There is sclerotic nodular thickening of the aortic valve cusps without evidence of significant stenosis or regurgitation. MPI 4/12/18:  Summary   No EKG evidence for ischemia with exercise   Normal LV size and systolic function. There is normal isotope uptake at stress and rest. There is no evidence of  myocardial ischemia or scar. Assessment:  1. Chronic systolic heart failure. NYHA class I. Appears stable. 2. Cardiomyopathy. EF 40-45%. Normal stress test 4/2018. On ACE, evidence based beta blocker. No AA secondary to hyperkalemia. 3. Essential hypertension. Controlled. 4. History of lung cancer. Followed by Dr Anup Gonsalez at [facility]. Plan:  1. Continue current medications. 2. Labs as per PCP. 3. Follow up in 6 months, earlier for worsening. Thank you for allowing me to participate in the care of your patient.     Lisa Hutchison, NIDIA-CNP

## 2019-06-12 ENCOUNTER — OFFICE VISIT (OUTPATIENT)
Dept: INTERNAL MEDICINE CLINIC | Age: 69
End: 2019-06-12
Payer: COMMERCIAL

## 2019-06-12 VITALS
WEIGHT: 132.2 LBS | RESPIRATION RATE: 16 BRPM | HEART RATE: 68 BPM | BODY MASS INDEX: 20.03 KG/M2 | SYSTOLIC BLOOD PRESSURE: 136 MMHG | HEIGHT: 68 IN | DIASTOLIC BLOOD PRESSURE: 82 MMHG

## 2019-06-12 DIAGNOSIS — E78.2 MIXED HYPERLIPIDEMIA: ICD-10-CM

## 2019-06-12 DIAGNOSIS — E55.9 VITAMIN D DEFICIENCY: ICD-10-CM

## 2019-06-12 DIAGNOSIS — Z12.11 SCREENING FOR COLORECTAL CANCER: ICD-10-CM

## 2019-06-12 DIAGNOSIS — Z12.12 SCREENING FOR COLORECTAL CANCER: ICD-10-CM

## 2019-06-12 DIAGNOSIS — M1A.0490 IDIOPATHIC CHRONIC GOUT OF HAND WITHOUT TOPHUS, UNSPECIFIED LATERALITY: ICD-10-CM

## 2019-06-12 DIAGNOSIS — I50.21 ACUTE SYSTOLIC (CONGESTIVE) HEART FAILURE (HCC): ICD-10-CM

## 2019-06-12 DIAGNOSIS — E11.9 TYPE 2 DIABETES MELLITUS WITHOUT COMPLICATION, WITHOUT LONG-TERM CURRENT USE OF INSULIN (HCC): ICD-10-CM

## 2019-06-12 DIAGNOSIS — C34.2 PRIMARY CANCER OF RIGHT MIDDLE LOBE OF LUNG (HCC): ICD-10-CM

## 2019-06-12 DIAGNOSIS — I10 ESSENTIAL HYPERTENSION: Primary | ICD-10-CM

## 2019-06-12 DIAGNOSIS — C34.91 SQUAMOUS CELL CARCINOMA OF RIGHT LUNG (HCC): ICD-10-CM

## 2019-06-12 DIAGNOSIS — J90 PLEURAL EFFUSION, BILATERAL: ICD-10-CM

## 2019-06-12 DIAGNOSIS — C77.1 SECONDARY MALIGNANT NEOPLASM OF INTRATHORACIC LYMPH NODES (HCC): ICD-10-CM

## 2019-06-12 DIAGNOSIS — G25.81 RLS (RESTLESS LEGS SYNDROME): ICD-10-CM

## 2019-06-12 DIAGNOSIS — J44.9 COPD, MODERATE (HCC): ICD-10-CM

## 2019-06-12 PROCEDURE — 99214 OFFICE O/P EST MOD 30 MIN: CPT | Performed by: INTERNAL MEDICINE

## 2019-06-12 ASSESSMENT — ENCOUNTER SYMPTOMS
COUGH: 1
EYES NEGATIVE: 1
NAUSEA: 0
RHINORRHEA: 1
ALLERGIC/IMMUNOLOGIC NEGATIVE: 1
DIARRHEA: 1
ANAL BLEEDING: 0

## 2019-06-12 NOTE — PROGRESS NOTES
Subjective:      Patient ID: Gissel Rees is a 77 y.o. male. HPI  Here today for follow up of chronic problems as per HPI and as problems listed under assessment and plan,no new c/o feels good     Hyperlipidemia   This is a chronic problem. The current episode started more than 1 year ago. The problem is controlled. Recent lipid tests were reviewed and are normal. Exacerbating diseases include diabetes. Pertinent negatives include no chest pain. Current antihyperlipidemic treatment includes diet change, exercise and statins. The current treatment provides significant improvement of lipids. There are no compliance problems. Risk factors for coronary artery disease include diabetes mellitus, dyslipidemia, hypertension and male sex. Hypertension   This is a chronic problem. The current episode started more than 1 year ago. The problem is unchanged. The problem is controlled. Pertinent negatives include no chest pain, headaches or palpitations. There are no associated agents to hypertension. Risk factors for coronary artery disease include diabetes mellitus, dyslipidemia and male gender. Past treatments include beta blockers, ACE inhibitors, lifestyle changes, diuretics and calcium channel blockers. The current treatment provides significant improvement. There are no compliance problems. CHF. Diabetes   He presents for his follow-up diabetic visit. He has type 2 diabetes mellitus. His disease course has been fluctuating. There are no hypoglycemic associated symptoms. Pertinent negatives for hypoglycemia include no headaches. Pertinent negatives for diabetes include no chest pain, no fatigue, no polydipsia, no polyphagia and no polyuria. There are no hypoglycemic complications. Symptoms are stable. Diabetic complications include heart disease. Risk factors for coronary artery disease include diabetes mellitus, dyslipidemia, hypertension and male sex.  Current diabetic treatment includes diet, insulin injections and oral agent (monotherapy). He is compliant with treatment all of the time. His weight is stable. He is following a diabetic, low fat/cholesterol and low salt diet. He participates in exercise intermittently. His home blood glucose trend is fluctuating minimally. An ACE inhibitor/angiotensin II receptor blocker is being taken. Eye exam is current. No Known Allergies    Current Outpatient Medications   Medication Sig Dispense Refill    carvedilol (COREG) 12.5 MG tablet Take 1 tablet by mouth 2 times daily (with meals) 60 tablet 3    rosuvastatin (CRESTOR) 40 MG tablet Take 1 tablet by mouth every evening 90 tablet 1    insulin glargine (LANTUS) 100 UNIT/ML injection pen Inject 20 Units into the skin nightly Indications: 18-20 units at hs 18 mL 3    lisinopril (PRINIVIL;ZESTRIL) 40 MG tablet TAKE 1 TABLET BY MOUTH EVERY DAY 90 tablet 2    furosemide (LASIX) 20 MG tablet Take 1 tablet by mouth daily 60 tablet 3    albuterol sulfate HFA (PROAIR HFA) 108 (90 Base) MCG/ACT inhaler Inhale 2 puffs into the lungs every 6 hours as needed for Wheezing 1 Inhaler 3    omeprazole (PRILOSEC) 20 MG delayed release capsule Take 20 mg by mouth as needed       metFORMIN (GLUCOPHAGE) 500 MG tablet TAKE 2 TABLETS BY MOUTH 2 TIMES DAILY (WITH MEALS) 360 tablet 2    amLODIPine (NORVASC) 10 MG tablet TAKE 1 TABLET BY MOUTH EVERY DAY 90 tablet 0    aspirin 81 MG tablet Take 81 mg by mouth daily       No current facility-administered medications for this visit.         Past Medical History:   Diagnosis Date    Allergic rhinitis, cause unspecified 7/15/2010    [facility] [address] [address]     lung    Colon polyps     Diabetes mellitus ([facility] [address]     Essential hypertension, benign 7/15/2010    HYPERCHOLESTERAEMIA     Hypertension     Lipoma of other specified sites 7/15/2010    Other abnormal blood chemistry 7/15/2010    Other and unspecified hyperlipidemia 7/15/2010    Other symptoms involving cardiovascular system 7/15/2010    Psychosexual dysfunction with inhibited sexual excitement 7/15/2010       Family History   Problem Relation Age of Onset    Breast Cancer Mother        Past Surgical History:   Procedure Laterality Date    BRONCHOSCOPY Right 2017    Dr. Rubia Kim - w/R BI stent placement size 12x3    BRONCHOSCOPY  2017    FAVIO forman/BILLIEUS    BRONCHOSCOPY  2017    COLONOSCOPY  10/31/2007    polyps    COLONOSCOPY  2012    polyp- repeat 2017    INGUINAL HERNIA REPAIR Right 1975       Social History     Socioeconomic History    Marital status:      Spouse name: Not on file    Number of children: Not on file    Years of education: Not on file    Highest education level: Not on file   Occupational History    Not on file   Social Needs    Financial resource strain: Not on file    Food insecurity:     Worry: Not on file     Inability: Not on file    Transportation needs:     Medical: Not on file     Non-medical: Not on file   Tobacco Use    Smoking status: Former Smoker     Packs/day: 0.50     Years: 40.00     Pack years: 20.00     Types: Cigarettes     Start date: 1965     Last attempt to quit: 2017     Years since quittin.1    Smokeless tobacco: Never Used    Tobacco comment:  To try gum or patch to start quiting classes    Substance and Sexual Activity    Alcohol use: No    Drug use: No     Types: Marijuana    Sexual activity: Yes     Partners: Female     Comment: on occasion   Lifestyle    Physical activity:     Days per week: Not on file     Minutes per session: Not on file    Stress: Not on file   Relationships    Social connections:     Talks on phone: Not on file     Gets together: Not on file     Attends [employer] service: Not on file     Active member of club or organization: Not on file     Attends meetings of clubs or organizations: Not on file     Relationship status: Not on file    Intimate partner violence:     Fear of current or ex partner: Not on file Emotionally abused: Not on file     Physically abused: Not on file     Forced sexual activity: Not on file   Other Topics Concern    Not on file   Social History Narrative    Not on file       Review of Systems  Review of Systems   Constitutional: Negative for chills, diaphoresis, fatigue and unexpected weight change (up a few# ). HENT: Positive for rhinorrhea. Eyes: Negative. Reading glasses   Respiratory: Positive for cough. DC smoking as noted with Dx of lung Ca imporoved breathing as noted    Cardiovascular: Negative. Negative for chest pain and palpitations. Gastrointestinal: Positive for diarrhea (occ from metformin ). Negative for anal bleeding and nausea. Occ GI c/o with metformin   GERD ? Check labs try Prilosec    Endocrine: Positive for cold intolerance. Negative for heat intolerance, polydipsia, polyphagia and polyuria. Genitourinary: Positive for frequency. Musculoskeletal: Negative. Back pain: improved         Occ RLS symptoms    Skin: Negative. Allergic/Immunologic: Negative. Neurological: Negative. Negative for numbness and headaches. Hematological: Negative. Psychiatric/Behavioral: Negative. Objective:   Physical Exam:  Physical Exam   Constitutional: He is oriented to person, place, and time. He appears well-developed and well-nourished. HENT:   Head: Normocephalic and atraumatic. Nose: Nose normal.   Mouth/Throat: Oropharynx is clear and moist.   Dentures     Eyes: Pupils are equal, round, and reactive to light. Conjunctivae and EOM are normal.   Neck: Normal range of motion. Neck supple. No tracheal deviation present. No thyromegaly present. Cardiovascular: Normal rate, regular rhythm, normal heart sounds and intact distal pulses. No murmur heard. Pulmonary/Chest: Effort normal. No respiratory distress. He has no rales. Abdominal: Soft. Bowel sounds are normal. He exhibits no mass. There is tenderness (epigastric ).  There is no rebound and no guarding. Musculoskeletal: Normal range of motion. Neurological: He is alert and oriented to person, place, and time. He has normal reflexes. Skin: Skin is warm and dry. Psychiatric: He has a normal mood and affect. His behavior is normal.       /82 (Site: Right Upper Arm, Position: Sitting, Cuff Size: Medium Adult)   Pulse 68   Resp 16   Ht 5' 8\" (1.727 m)   Wt 132 lb 3.2 oz (60 kg)   BMI 20.10 kg/m²       Assessment & Plan:         Mixed hyperlipidemia  Stable no change in meds return in 3 mo. Labs pending     Type 2 diabetes mellitus without complication, without long-term current use of insulin (HCC)  Remains somewhat out of control cont meds and diet check labs     Essential hypertension  Stable no change in meds return in 3 mo.  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

## 2019-06-17 ENCOUNTER — APPOINTMENT (OUTPATIENT)
Dept: CT IMAGING | Age: 69
End: 2019-06-17
Payer: COMMERCIAL

## 2019-06-17 ENCOUNTER — HOSPITAL ENCOUNTER (EMERGENCY)
Age: 69
Discharge: HOME | End: 2019-06-17
Attending: EMERGENCY MEDICINE
Payer: COMMERCIAL

## 2019-06-17 VITALS
RESPIRATION RATE: 18 BRPM | BODY MASS INDEX: 19.62 KG/M2 | OXYGEN SATURATION: 96 % | TEMPERATURE: 98.8 F | SYSTOLIC BLOOD PRESSURE: 148 MMHG | WEIGHT: 129.44 LBS | HEIGHT: 68 IN | HEART RATE: 80 BPM | DIASTOLIC BLOOD PRESSURE: 84 MMHG

## 2019-06-17 DIAGNOSIS — K40.90 NON-RECURRENT UNILATERAL INGUINAL HERNIA WITHOUT OBSTRUCTION OR GANGRENE: Primary | ICD-10-CM

## 2019-06-17 DIAGNOSIS — N32.89 BLADDER MASS: ICD-10-CM

## 2019-06-17 LAB
A/G RATIO: 1.1 (ref 1.1–2.2)
ALBUMIN SERPL-MCNC: 4.3 G/DL (ref 3.4–5)
ALP BLD-CCNC: 158 U/L (ref 40–129)
ALT SERPL-CCNC: 10 U/L (ref 10–40)
ANION GAP SERPL CALCULATED.3IONS-SCNC: 14 MMOL/L (ref 3–16)
AST SERPL-CCNC: 15 U/L (ref 15–37)
BASOPHILS ABSOLUTE: 0 K/UL (ref 0–0.2)
BASOPHILS RELATIVE PERCENT: 0.6 %
BILIRUB SERPL-MCNC: 0.5 MG/DL (ref 0–1)
BILIRUBIN URINE: NEGATIVE
BLOOD, URINE: NEGATIVE
BUN BLDV-MCNC: 12 MG/DL (ref 7–20)
CALCIUM SERPL-MCNC: 10 MG/DL (ref 8.3–10.6)
CHLORIDE BLD-SCNC: 96 MMOL/L (ref 99–110)
CLARITY: CLEAR
CO2: 27 MMOL/L (ref 21–32)
COLOR: YELLOW
CREAT SERPL-MCNC: 0.8 MG/DL (ref 0.8–1.3)
EOSINOPHILS ABSOLUTE: 0.1 K/UL (ref 0–0.6)
EOSINOPHILS RELATIVE PERCENT: 1.4 %
GFR AFRICAN AMERICAN: >60
GFR NON-AFRICAN AMERICAN: >60
GLOBULIN: 3.8 G/DL
GLUCOSE BLD-MCNC: 162 MG/DL (ref 70–99)
GLUCOSE URINE: NEGATIVE MG/DL
HCT VFR BLD CALC: 36.9 % (ref 40.5–52.5)
HEMOGLOBIN: 12.3 G/DL (ref 13.5–17.5)
KETONES, URINE: NEGATIVE MG/DL
LEUKOCYTE ESTERASE, URINE: NEGATIVE
LIPASE: 9 U/L (ref 13–60)
LYMPHOCYTES ABSOLUTE: 0.9 K/UL (ref 1–5.1)
LYMPHOCYTES RELATIVE PERCENT: 13.5 %
MCH RBC QN AUTO: 30.1 PG (ref 26–34)
MCHC RBC AUTO-ENTMCNC: 33.4 G/DL (ref 31–36)
MCV RBC AUTO: 90 FL (ref 80–100)
MICROSCOPIC EXAMINATION: NORMAL
MONOCYTES ABSOLUTE: 0.5 K/UL (ref 0–1.3)
MONOCYTES RELATIVE PERCENT: 7.7 %
NEUTROPHILS ABSOLUTE: 5 K/UL (ref 1.7–7.7)
NEUTROPHILS RELATIVE PERCENT: 76.8 %
NITRITE, URINE: NEGATIVE
PDW BLD-RTO: 13.8 % (ref 12.4–15.4)
PH UA: 6 (ref 5–8)
PLATELET # BLD: 221 K/UL (ref 135–450)
PMV BLD AUTO: 8.2 FL (ref 5–10.5)
POTASSIUM SERPL-SCNC: 3.9 MMOL/L (ref 3.5–5.1)
PROTEIN UA: NEGATIVE MG/DL
RBC # BLD: 4.1 M/UL (ref 4.2–5.9)
SODIUM BLD-SCNC: 137 MMOL/L (ref 136–145)
SPECIFIC GRAVITY UA: 1.01 (ref 1–1.03)
TOTAL PROTEIN: 8.1 G/DL (ref 6.4–8.2)
URINE REFLEX TO CULTURE: NORMAL
URINE TYPE: NORMAL
UROBILINOGEN, URINE: 0.2 E.U./DL
WBC # BLD: 6.6 K/UL (ref 4–11)

## 2019-06-17 PROCEDURE — 99284 EMERGENCY DEPT VISIT MOD MDM: CPT

## 2019-06-17 PROCEDURE — 80053 COMPREHEN METABOLIC PANEL: CPT

## 2019-06-17 PROCEDURE — 6360000004 HC RX CONTRAST MEDICATION: Performed by: EMERGENCY MEDICINE

## 2019-06-17 PROCEDURE — 74177 CT ABD & PELVIS W/CONTRAST: CPT

## 2019-06-17 PROCEDURE — 83690 ASSAY OF LIPASE: CPT

## 2019-06-17 PROCEDURE — 96374 THER/PROPH/DIAG INJ IV PUSH: CPT

## 2019-06-17 PROCEDURE — 6360000002 HC RX W HCPCS: Performed by: PHYSICIAN ASSISTANT

## 2019-06-17 PROCEDURE — 81003 URINALYSIS AUTO W/O SCOPE: CPT

## 2019-06-17 PROCEDURE — 85025 COMPLETE CBC W/AUTO DIFF WBC: CPT

## 2019-06-17 RX ORDER — DOCUSATE SODIUM 100 MG/1
100 CAPSULE, LIQUID FILLED ORAL 2 TIMES DAILY PRN
Qty: 30 CAPSULE | Refills: 0 | Status: SHIPPED | OUTPATIENT
Start: 2019-06-17 | End: 2019-08-09

## 2019-06-17 RX ORDER — MORPHINE SULFATE 4 MG/ML
4 INJECTION, SOLUTION INTRAMUSCULAR; INTRAVENOUS EVERY 30 MIN PRN
Status: DISCONTINUED | OUTPATIENT
Start: 2019-06-17 | End: 2019-06-17 | Stop reason: HOSPADM

## 2019-06-17 RX ORDER — HYDROCODONE BITARTRATE AND ACETAMINOPHEN 5; 325 MG/1; MG/1
.5-1 TABLET ORAL EVERY 6 HOURS PRN
Qty: 10 TABLET | Refills: 0 | Status: SHIPPED | OUTPATIENT
Start: 2019-06-17 | End: 2019-06-20

## 2019-06-17 RX ADMIN — IOPAMIDOL 75 ML: 755 INJECTION, SOLUTION INTRAVENOUS at 10:41

## 2019-06-17 RX ADMIN — MORPHINE SULFATE 4 MG: 4 INJECTION INTRAVENOUS at 10:30

## 2019-06-17 ASSESSMENT — PAIN SCALES - GENERAL
PAINLEVEL_OUTOF10: 6
PAINLEVEL_OUTOF10: 7
PAINLEVEL_OUTOF10: 4

## 2019-06-17 ASSESSMENT — ENCOUNTER SYMPTOMS
ABDOMINAL PAIN: 1
DIARRHEA: 0
SHORTNESS OF BREATH: 0
BLOOD IN STOOL: 0
VOMITING: 0
BACK PAIN: 0
NAUSEA: 0

## 2019-06-17 ASSESSMENT — PAIN DESCRIPTION - PAIN TYPE: TYPE: ACUTE PAIN

## 2019-06-17 ASSESSMENT — PAIN DESCRIPTION - LOCATION: LOCATION: ABDOMEN

## 2019-06-17 NOTE — ED PROVIDER NOTES
[facility]        Pt Name: Bertrand Rubio  MRN: [id number]  [place] 1950  Date of evaluation: 6/17/2019  Provider: Xavier Negron PA-C  PCP: France Lopez MD    This patient was seen and evaluated by the attending physician Luz Flores COMPLAINT       Chief Complaint   Patient presents with    Abdominal Pain     left sided abdominal pain patient states \"its a rupture\" patient reports possible hernia rupture. pain started Friday. states feels like a pinching pain. patient reports swelling to lower left side of abdomen. HISTORY OF PRESENT ILLNESS   (Location/Symptom, Timing/Onset, Context/Setting, Quality, Duration, Modifying Factors, Severity)  Note limiting factors. Bertrand Rubio is a 77 y.o. male that presents to the emergency department with a chief complaint of painful left inguinal mass that he is concerned is a hernia that is been present for about 1 week. Is unsure of exactly what he was doing or how long the hernia has been there. He states about 22 years ago he did have right inguinal hernia repair with mesh. Denies any other history of abdominal surgery. Denies difficulty urinating, dysuria, hematuria. States he had a normal bowel movement 3 days ago. Had some diarrhea yesterday but states this is frequent for him with him being on metformin. Denies bloody stools, black tarry stools, chest pain, shortness breath. Denies any pain just sitting there and states he only has pain in certain positions or if he touches on the area. Rates the pain a 7 out of 10. Nursing Notes were all reviewed and agreed with or any disagreements were addressed  in the HPI. REVIEW OF SYSTEMS    (2-9 systems for level 4, 10 or more for level 5)     Review of Systems   Constitutional: Negative for chills and fever. Respiratory: Negative for shortness of breath.     Cardiovascular: Negative for chest R-2Normal      furosemide (LASIX) 20 MG tablet Take 1 tablet by mouth daily, Disp-60 tablet, R-3Normal      albuterol sulfate HFA (PROAIR HFA) 108 (90 Base) MCG/ACT inhaler Inhale 2 puffs into the lungs every 6 hours as needed for Wheezing, Disp-1 Inhaler, R-3Normal      omeprazole (PRILOSEC) 20 MG delayed release capsule Take 20 mg by mouth as needed Historical Med      metFORMIN (GLUCOPHAGE) 500 MG tablet TAKE 2 TABLETS BY MOUTH 2 TIMES DAILY (WITH MEALS), Disp-360 tablet, R-2Normal      amLODIPine (NORVASC) 10 MG tablet TAKE 1 TABLET BY MOUTH EVERY DAY, Disp-90 tablet, R-0Normal      aspirin 81 MG tablet Take 81 mg by mouth dailyHistorical Med               ALLERGIES     Patient has no known allergies. FAMILYHISTORY       Family History   Problem Relation Age of Onset    Breast Cancer Mother           SOCIAL HISTORY       Social History     Socioeconomic History    Marital status:      Spouse name: None    Number of children: None    Years of education: None    Highest education level: None   Occupational History    None   Social Needs    Financial resource strain: None    Food insecurity:     Worry: None     Inability: None    Transportation needs:     Medical: None     Non-medical: None   Tobacco Use    Smoking status: Former Smoker     Packs/day: 0.50     Years: 40.00     Pack years: 20.00     Types: Cigarettes     Start date: 1965     Last attempt to quit: 2017     Years since quittin.1    Smokeless tobacco: Never Used    Tobacco comment:  To try gum or patch to start quiting classes    Substance and Sexual Activity    Alcohol use: No    Drug use: No     Types: Marijuana    Sexual activity: Yes     Partners: Female     Comment: on occasion   Lifestyle    Physical activity:     Days per week: None     Minutes per session: None    Stress: None   Relationships    Social connections:     Talks on phone: None     Gets together: None     Attends Hinduism service: None Active member of club or organization: None     Attends meetings of clubs or organizations: None     Relationship status: None    Intimate partner violence:     Fear of current or ex partner: None     Emotionally abused: None     Physically abused: None     Forced sexual activity: None   Other Topics Concern    None   Social History Narrative    None       SCREENINGS             PHYSICAL EXAM    (up to 7 for level 4, 8 or more for level 5)     ED Triage Vitals [06/17/19 0857]   BP Temp Temp Source Pulse Resp SpO2 Height Weight   (!) 170/91 98.8 °F (37.1 °C) Infrared 80 18 100 % 5' 8\" (1.727 m) 129 lb 7 oz (58.7 kg)       Physical Exam   Constitutional: He is oriented to person, place, and time. He appears well-developed and well-nourished. HENT:   Head: Atraumatic. Eyes: Right eye exhibits no discharge. Left eye exhibits no discharge. Neck: Normal range of motion. Cardiovascular: Normal rate, regular rhythm and normal heart sounds. Exam reveals no gallop and no friction rub. No murmur heard. Pulmonary/Chest: Effort normal and breath sounds normal. No stridor. No respiratory distress. He has no wheezes. He has no rales. Abdominal: Soft. Bowel sounds are normal. He exhibits mass. He exhibits no distension. There is tenderness. There is no rebound and no guarding. No hernia. Painful left inguinal hernia. Unable to reduce secondary to pain at this time. No rigidity. Musculoskeletal: Normal range of motion. He exhibits no edema, tenderness or deformity. Neurological: He is alert and oriented to person, place, and time. No cranial nerve deficit. Skin: Skin is warm and dry. No rash noted. He is not diaphoretic. No erythema. Psychiatric: He has a normal mood and affect. His behavior is normal.   Nursing note and vitals reviewed.       DIAGNOSTIC RESULTS   LABS:    Labs Reviewed   CBC WITH AUTO DIFFERENTIAL - Abnormal; Notable for the following components:       Result Value    RBC 4.10 (*) Hemoglobin 12.3 (*)     Hematocrit 36.9 (*)     Lymphocytes # 0.9 (*)     All other components within normal limits    Narrative:     Performed at:  [facility] [address] [facility]  [place] [address] [employer]   Phone [phone]   COMPREHENSIVE METABOLIC PANEL - Abnormal; Notable for the following components:    Chloride 96 (*)     Glucose 162 (*)     Alkaline Phosphatase 158 (*)     All other components within normal limits    Narrative:     Performed at:  [facility] [address] [facility]  [place] [address] [address] [employer]   Phone [phone]   LIPASE - Abnormal; Notable for the following components:    Lipase 9.0 (*)     All other components within normal limits    Narrative:     Performed at:  [facility] [address] [facility]  [place] [address] [employer]   Phone [phone]   URINE RT REFLEX TO CULTURE    Narrative:     Performed at:  [facility] [address] [address] [employer]   Phone [phone]       All other labs were within normal range or not returned as of this dictation. EKG: All EKG's are interpreted by the Emergency Department Physician who either signs orCo-signs this chart in the absence of a cardiologist.  Please see their note for interpretation of EKG. RADIOLOGY:   Non-plain film images such as CT, Ultrasound and MRI are read by the radiologist. Plain radiographic images are visualized andpreliminarily interpreted by the  ED Provider with the below findings:        Interpretation perthe Radiologist below, if available at the time of this note:    CT ABDOMEN PELVIS W IV CONTRAST Additional Contrast? None   Final Result   1. There is a left inguinal hernia which contains omental fat. There   infiltration of the herniated fat compatible with strangulation and fat   infarct   2. 1 cm polypoid urinary bladder mass. Recommend urologic consultation   3. Prostatic enlargement   4. Chronic calcific pancreatitis   5. Sigmoid diverticulosis           No results found. PROCEDURES   Unless otherwise noted below, none     Procedures    CRITICAL CARE TIME   N/A    CONSULTS: Dr. Cassidy Myers and DIFFERENTIALDIAGNOSIS/MDM:   Vitals:    Vitals:    06/17/19 0857 06/17/19 1100 06/17/19 1130 06/17/19 1200   BP: (!) 170/91 (!) 171/76 (!) 137/113 (!) 148/84   Pulse: 80      Resp: 18      Temp: 98.8 °F (37.1 °C)      TempSrc: Infrared      SpO2: 100% 99% 97% 96%   Weight: 129 lb 7 oz (58.7 kg)      Height: 5' 8\" (1.727 m)          Patient was given thefollowing medications:  Medications   iopamidol (ISOVUE-370) 76 % injection 75 mL (75 mLs Intravenous Given 6/17/19 1041)       Patient presented with some left lower quadrant abdominal pain. Has nonreducible hernia in the left inguinal region. Both myself and attending try to reduce this in Trendelenburg. Laboratory testing is unremarkable. He does have incarcerated omentum fat hernia. We discussed this with general surgery who states that they can follow-up with the patient this week. Patient will call general surgery today to schedule an appointment. Has no pain just sitting there only some pain when touching this or moving a certain way. He was also educated on his incidental bladder mass finding. He will follow-up with urology. Will return if any worsening of symptoms or problems at home. FINAL IMPRESSION      1. Incarcerated omental fat    2.  Bladder mass          DISPOSITION/PLAN   DISPOSITION Decision To Discharge 06/17/2019 12:04:11 PM      PATIENT REFERREDTO:  Garry Chapman MD  [address]  [address] [facility] [address] [address]  [phone]    Call today  For re-check this week    MD Rashaad Saenz Micah  [address] [facility] [address]  [phone]    Schedule an appointment as soon as possible for a visit   3-5 days, For re-check    [facility]  [address] [facility]  [address] [facility] [address]  [phone]    As needed      DISCHARGE MEDICATIONS:  Discharge Medication List as of 6/17/2019 12:26 PM      START taking these medications    Details   HYDROcodone-acetaminophen (NORCO) 5-325 MG per tablet Take 0.5-1 tablets by mouth every 6 hours as needed for Pain for up to 3 days. , Disp-10 tablet, R-0Print      docusate sodium (COLACE) 100 MG capsule Take 1 capsule by mouth 2 times daily as needed for Constipation, Disp-30 capsule, R-0Print             DISCONTINUED MEDICATIONS:  Discharge Medication List as of 6/17/2019 12:26 PM                 (Please note that portions ofthis note were completed with a voice recognition program.  Efforts were made to edit the dictations but occasionally words are mis-transcribed.)    Sylvie Shaver PA-C (electronically signed)            Sylvie Shaver PA-C  06/17/19 9960

## 2019-06-17 NOTE — ED PROVIDER NOTES
This patient was seen by the Mid-Level Provider. I have seen and examined the patient, agree with the workup, evaluation, management and diagnosis. Care plan has been discussed. My assessment reveals a 7year-old male who presents with a left inguinal mass. The patient presents with a left-sided inguinal mass he noticed since around Friday. The patient states is actually not that painful he mostly just complains of the mass. Patient has a history of a right-sided inguinal hernia repaired earlier in life. He denies any difficulties with his bowels. He denies any fevers or chills or nausea or vomiting. I will take it only when he finishes    Exam: Well-nourished male in no acute distress.  exam shows a mass over his left inguinal area consistent with a hernia. There is no significant pain to palpation and there were no pulsatile masses noted. Testicles and scrotum are unremarkable. There is no erythema or signs of infection. I attempted to reduce the hernia without success. MDM: Patient's work-up included a CT of the abdomen pelvis that shows no evidence of an incarcerated hernia. It does show some omental and fat strangulation. Again, however, the patient is not in significant pain. We contacted surgery and discussed the case in detail with him. They have instructed us to discharge the patient with follow-up this week for definitive care. The patient is to return if worse. The patient feels comfortable with this approach.     Results for orders placed or performed during the hospital encounter of 06/17/19   CBC Auto Differential   Result Value Ref Range    WBC 6.6 4.0 - 11.0 K/uL    RBC 4.10 (L) 4.20 - 5.90 M/uL    Hemoglobin 12.3 (L) 13.5 - 17.5 g/dL    Hematocrit 36.9 (L) 40.5 - 52.5 %    MCV 90.0 80.0 - 100.0 fL    MCH 30.1 26.0 - 34.0 pg    MCHC 33.4 31.0 - 36.0 g/dL    RDW 13.8 12.4 - 15.4 %    Platelets 496 633 - 954 K/uL    MPV 8.2 5.0 - 10.5 fL    Neutrophils % 76.8 %    Lymphocytes % 13.5 %    Monocytes % 7.7 %    Eosinophils % 1.4 %    Basophils % 0.6 %    Neutrophils # 5.0 1.7 - 7.7 K/uL    Lymphocytes # 0.9 (L) 1.0 - 5.1 K/uL    Monocytes # 0.5 0.0 - 1.3 K/uL    Eosinophils # 0.1 0.0 - 0.6 K/uL    Basophils # 0.0 0.0 - 0.2 K/uL   Comprehensive Metabolic Panel   Result Value Ref Range    Sodium 137 136 - 145 mmol/L    Potassium 3.9 3.5 - 5.1 mmol/L    Chloride 96 (L) 99 - 110 mmol/L    CO2 27 21 - 32 mmol/L    Anion Gap 14 3 - 16    Glucose 162 (H) 70 - 99 mg/dL    BUN 12 7 - 20 mg/dL    CREATININE 0.8 0.8 - 1.3 mg/dL    GFR Non-African American >60 >60    GFR African American >60 >60    Calcium 10.0 8.3 - 10.6 mg/dL    Total Protein 8.1 6.4 - 8.2 g/dL    Alb 4.3 3.4 - 5.0 g/dL    Albumin/Globulin Ratio 1.1 1.1 - 2.2    Total Bilirubin 0.5 0.0 - 1.0 mg/dL    Alkaline Phosphatase 158 (H) 40 - 129 U/L    ALT 10 10 - 40 U/L    AST 15 15 - 37 U/L    Globulin 3.8 g/dL   Lipase   Result Value Ref Range    Lipase 9.0 (L) 13.0 - 60.0 U/L   Urinalysis Reflex to Culture   Result Value Ref Range    Color, UA YELLOW Straw/Yellow    Clarity, UA Clear Clear    Glucose, Ur Negative Negative mg/dL    Bilirubin Urine Negative Negative    Ketones, Urine Negative Negative mg/dL    Specific Gravity, UA 1.008 1.005 - 1.030    Blood, Urine Negative Negative    pH, UA 6.0 5.0 - 8.0    Protein, UA Negative Negative mg/dL    Urobilinogen, Urine 0.2 <2.0 E.U./dL    Nitrite, Urine Negative Negative    Leukocyte Esterase, Urine Negative Negative    Microscopic Examination Not Indicated     Urine Reflex to Culture Not Indicated     Urine Type Not Specified      Ct Abdomen Pelvis W Iv Contrast Additional Contrast? None    Result Date: 6/17/2019  EXAMINATION: CT OF THE ABDOMEN AND PELVIS WITH CONTRAST 6/17/2019 10:40 am TECHNIQUE: CT of the abdomen and pelvis was performed with the administration of intravenous contrast. Multiplanar reformatted images are provided for review.  Dose modulation, iterative reconstruction,

## 2019-06-17 NOTE — ED NOTES
Discharge instructions and prescriptions given to and reviewed with patient. Verbalized understanding.      Liliana Santiago RN  06/17/19 1744

## 2019-06-21 ENCOUNTER — OFFICE VISIT (OUTPATIENT)
Dept: SURGERY | Age: 69
End: 2019-06-21
Payer: COMMERCIAL

## 2019-06-21 VITALS
SYSTOLIC BLOOD PRESSURE: 146 MMHG | HEIGHT: 68 IN | DIASTOLIC BLOOD PRESSURE: 80 MMHG | BODY MASS INDEX: 20.46 KG/M2 | WEIGHT: 135 LBS

## 2019-06-21 DIAGNOSIS — K40.90 NON-RECURRENT UNILATERAL INGUINAL HERNIA WITHOUT OBSTRUCTION OR GANGRENE: Primary | ICD-10-CM

## 2019-06-21 PROCEDURE — 99243 OFF/OP CNSLTJ NEW/EST LOW 30: CPT | Performed by: SURGERY

## 2019-06-21 SDOH — HEALTH STABILITY: MENTAL HEALTH: HOW OFTEN DO YOU HAVE A DRINK CONTAINING ALCOHOL?: MONTHLY OR LESS

## 2019-06-21 ASSESSMENT — ENCOUNTER SYMPTOMS
ALLERGIC/IMMUNOLOGIC NEGATIVE: 1
GASTROINTESTINAL NEGATIVE: 1
RESPIRATORY NEGATIVE: 1
EYES NEGATIVE: 1

## 2019-06-21 NOTE — PROGRESS NOTES
:     Dulce Harrell is a 77 y.o. male      CC: Bulge in the left groin    HPI: 39-year-old male who presents for evaluation of a bulge in the left groin which was first noted about 3 weeks ago. The bulge became firm and painful about 1 week ago. He was seen in the emergency room 3 days later where he underwent a CAT scan which demonstrated a fat-containing inguinal hernia. CAT scan also showed a small polyp in the bladder. Other than localized left groin discomfort, the patient denies additional symptoms including nausea, vomiting, anorexia, fevers, chills, change in bowel habits or urinary symptoms. Past Medical History:   Diagnosis Date    Allergic rhinitis, cause unspecified 7/15/2010    Cancer ([facility]     lung    Colon polyps     Diabetes mellitus ([facility]     Essential hypertension, benign 7/15/2010    HYPERCHOLESTERAEMIA     Hypertension     Lipoma of other specified sites 7/15/2010    Other abnormal blood chemistry 7/15/2010    Other and unspecified hyperlipidemia 7/15/2010    Other symptoms involving cardiovascular system 7/15/2010    Psychosexual dysfunction with inhibited sexual excitement 7/15/2010       Patient has no known allergies. Past Surgical History:   Procedure Laterality Date    BRONCHOSCOPY Right 04/27/2017    Dr. Jane Celeste - w/R BI stent placement size 12x3    BRONCHOSCOPY  04/24/2017    FAVIO Bernardo - w/EBUS    BRONCHOSCOPY  09/12/2017    COLONOSCOPY  10/31/2007    polyps    COLONOSCOPY  2012    polyp- repeat 2017    INGUINAL HERNIA REPAIR Right 1975        Prior to Visit Medications    Medication Sig Taking?  Authorizing Provider   docusate sodium (COLACE) 100 MG capsule Take 1 capsule by mouth 2 times daily as needed for Constipation Yes Jenn Aceves PA-C   carvedilol (COREG) 12.5 MG tablet Take 1 tablet by mouth 2 times daily (with meals) Yes NIDIA Baron - CNP   rosuvastatin (CRESTOR) 40 MG tablet Take 1 tablet by mouth every evening Yes Juan Maximus Meade MD   lisinopril (PRINIVIL;ZESTRIL) 40 MG tablet TAKE 1 TABLET BY MOUTH EVERY DAY Yes Francisco Demarco MD   furosemide (LASIX) 20 MG tablet Take 1 tablet by mouth daily Yes Jesus Chahal MD   albuterol sulfate HFA (PROAIR HFA) 108 (90 Base) MCG/ACT inhaler Inhale 2 puffs into the lungs every 6 hours as needed for Wheezing Yes Francisco Demarco MD   omeprazole (PRILOSEC) 20 MG delayed release capsule Take 20 mg by mouth as needed  Yes Historical Provider, MD   metFORMIN (GLUCOPHAGE) 500 MG tablet TAKE 2 TABLETS BY MOUTH 2 TIMES DAILY (WITH MEALS) Yes Francisco Demarco MD   amLODIPine (NORVASC) 10 MG tablet TAKE 1 TABLET BY MOUTH EVERY DAY Yes Francisco Demarco MD   aspirin 81 MG tablet Take 81 mg by mouth daily Yes Historical Provider, MD   insulin glargine (LANTUS) 100 UNIT/ML injection pen Inject 20 Units into the skin nightly Indications: 18-20 units at hs  Francisco Demarco MD       Social History     Socioeconomic History    Marital status:      Spouse name: Not on file    Number of children: Not on file    Years of education: Not on file    Highest education level: Not on file   Occupational History    Not on file   Social Needs    Financial resource strain: Not on file    Food insecurity:     Worry: Not on file     Inability: Not on file    Transportation needs:     Medical: Not on file     Non-medical: Not on file   Tobacco Use    Smoking status: Former Smoker     Packs/day: 0.50     Years: 40.00     Pack years: 20.00     Types: Cigarettes     Start date: 1965     Last attempt to quit: 2017     Years since quittin.1    Smokeless tobacco: Never Used    Tobacco comment:  To try gum or patch to start quiting classes    Substance and Sexual Activity    Alcohol use: Yes     Frequency: Monthly or less    Drug use: No     Types: Marijuana    Sexual activity: Yes     Partners: Female     Comment: on occasion   Lifestyle    Physical activity:     Days per week: Not on file Minutes per session: Not on file    Stress: Not on file   Relationships    Social connections:     Talks on phone: Not on file     Gets together: Not on file     Attends Rastafari service: Not on file     Active member of club or organization: Not on file     Attends meetings of clubs or organizations: Not on file     Relationship status: Not on file    Intimate partner violence:     Fear of current or ex partner: Not on file     Emotionally abused: Not on file     Physically abused: Not on file     Forced sexual activity: Not on file   Other Topics Concern    Not on file   Social History Narrative    Not on file       Review of Systems   Constitutional: Negative. HENT: Negative. Eyes: Negative. Respiratory: Negative. Cardiovascular: Negative. Gastrointestinal: Negative. Endocrine: Negative. Genitourinary: Negative. Musculoskeletal: Negative. Skin: Negative. Allergic/Immunologic: Negative. Neurological: Negative. Hematological: Negative. Psychiatric/Behavioral: Negative.        :   Physical Exam   Constitutional: He is oriented to person, place, and time. He appears well-developed and well-nourished. HENT:   Head: Normocephalic and atraumatic. Right Ear: External ear normal.   Left Ear: External ear normal.   Eyes: Conjunctivae and EOM are normal.   Neck: Normal range of motion. Neck supple. Cardiovascular: Normal rate and regular rhythm. Pulmonary/Chest: Effort normal and breath sounds normal.   Abdominal: Soft. He exhibits no distension. Moderate sized firm left inguinal hernia. No evidence of a right inguinal hernia   Musculoskeletal: Normal range of motion. He exhibits no edema. Neurological: He is alert and oriented to person, place, and time. Skin: Skin is warm and dry. Psychiatric: He has a normal mood and affect.  His behavior is normal.       :      49-year-old male who presents for evaluation of a bulge in the left groin region which was noted about 3 weeks ago. He was seen in the emergency 4 days ago where a CAT scan showed a left inguinal hernia attaining fat only. The CAT scan also showed an incidental 1 cm polyp within the bladder. Physical examination reveals a moderate sized, firm left inguinal hernia. Plan: We will plan for open left inguinal hernia repair with mesh. We will wait to schedule surgery until the patient has seen Dr. Maxim Gerard from urology regarding the bladder polyp. If necessary, Dr. Maxim Gerard could perform a cystoscopy prior to the inguinal hernia repair at the same setting. Patient explained risks, benefits and complications of hernia repair including hernia recurrence, infection requiring mesh removal, bowel injury or erosion of mesh into bowel and nerve entrapment.

## 2019-06-21 NOTE — COMMUNICATION BODY
Assessment:     75-year-old male who presents for evaluation of a bulge in the left groin region which was noted about 3 weeks ago. He was seen in the emergency 4 days ago where a CAT scan showed a left inguinal hernia attaining fat only. The CAT scan also showed an incidental 1 cm polyp within the bladder. Physical examination reveals a moderate sized, firm left inguinal hernia. Plan: We will plan for open left inguinal hernia repair with mesh. We will wait to schedule surgery until the patient has seen Dr. Pedro Abraham from urology regarding the bladder polyp. If necessary, Dr. Pedro Abraham could perform a cystoscopy prior to the inguinal hernia repair at the same setting. Patient explained risks, benefits and complications of hernia repair including hernia recurrence, infection requiring mesh removal, bowel injury or erosion of mesh into bowel and nerve entrapment.

## 2019-06-21 NOTE — LETTER
[facility]  [address] [address]  Phone: [phone]  Fax: [phone]    Jerad Nayak        June 21, 2019       Patient: Shaquille Pierce   MR Number: [id number]   YOB: 1950   Date of Visit: 6/21/2019       Dear Dr. Yomaira West: Thank you for the request for consultation for Fern Love. Below are the relevant portions of my assessment and plan of care. Assessment:     25-year-old male who presents for evaluation of a bulge in the left groin region which was noted about 3 weeks ago. He was seen in the emergency 4 days ago where a CAT scan showed a left inguinal hernia attaining fat only. The CAT scan also showed an incidental 1 cm polyp within the bladder. Physical examination reveals a moderate sized, firm left inguinal hernia. Plan: We will plan for open left inguinal hernia repair with mesh. We will wait to schedule surgery until the patient has seen Dr. Charla Holter from urology regarding the bladder polyp. If necessary, Dr. Charla Holter could perform a cystoscopy prior to the inguinal hernia repair at the same setting. If you have questions, please do not hesitate to call me. I look forward to following Ramona Mcneil along with you.     Sincerely,        Ramon Warren MD    CC providers:  Francisco Demarco MD  [address]  [address] [place]YE Facsimile: [phone]     Brittany Prescott PA-C  [address]  [address]  [address] [facility] [address] [place] [address] Street: [phone]

## 2019-07-16 RX ORDER — FUROSEMIDE 20 MG/1
20 TABLET ORAL DAILY
Qty: 30 TABLET | Refills: 2 | Status: SHIPPED | OUTPATIENT
Start: 2019-07-16 | End: 2019-10-09 | Stop reason: SDUPTHER

## 2019-07-23 ENCOUNTER — TELEPHONE (OUTPATIENT)
Dept: CARDIOLOGY CLINIC | Age: 69
End: 2019-07-23

## 2019-07-25 RX ORDER — CARVEDILOL 12.5 MG/1
12.5 TABLET ORAL 2 TIMES DAILY WITH MEALS
Qty: 60 TABLET | Refills: 3 | Status: SHIPPED | OUTPATIENT
Start: 2019-07-25 | End: 2019-11-12 | Stop reason: SDUPTHER

## 2019-08-09 NOTE — PROGRESS NOTES
(including no eye makeup) or nail polish on your fingers or toes. 10. DO NOT wear any jewelry or piercings on day of surgery. All body piercing jewelry must be removed. 11. If you have _x__dentures, they will be removed before going to the OR; we will provide you a container. If you wear ___contact lenses or _x__glasses, they will be removed; please bring a case for them. 12. Please see your family doctor/pediatrician for a history & physical and/or concerning medications. Bring any test results/reports from your physician's office. PCP__________________Phone___________H&P Appt. Date________             13 If you  have a Living Will and Durable Power of  for Healthcare, please bring in a copy. 15. Notify your Surgeon if you develop any illness between now and surgery  time, cough, cold, fever, sore throat, nausea, vomiting, etc.  Please notify your surgeon if you experience dizziness, shortness of breath or blurred vision between now & the time of your surgery             15. DO NOT shave your operative site 96 hours prior to surgery. For face & neck surgery, men may use an electric razor 48 hours prior to surgery. 16. Shower the night before surgery with _x__Antibacterial soap ___Hibiclens             17. To provide excellent care visitors will be limited to one in the room at any given time. 18.  Please bring picture ID and insurance card. 19.  Visit our web site for additional information:  [email or web address]              20.During flu season no children under the age of 15 are permitted in the hospital for the safety of all patients.                               21. If you take a long acting insulin in the evening only  take half of your usual  dose the night  before your procedure              22. If you use a c-pap please bring DOS if staying overnight,             23.For your convenience [facility] has a pharmacy on site to fill your prescriptions. 24. If you use oxygen and have a portable tank please bring it  with you the DOS             25. Bring a complete list of all your medications with name and dose include any supplements. 26. Other__________________________________________   *Please call pre admission testing if you any further questions   Elvis Larson [address]    [phone] [facility]  [phone]   [address] [place] [address]       All above information reviewed with patient in person or by phone. Patient verbalizes understanding. All questions and concerns addressed.                                                                                                  Patient/Rep_patient___________________                                                                                                                                    PRE OP INSTRUCTIONS

## 2019-08-14 ENCOUNTER — ANESTHESIA EVENT (OUTPATIENT)
Dept: OPERATING ROOM | Age: 69
End: 2019-08-14
Payer: COMMERCIAL

## 2019-08-14 ENCOUNTER — ANESTHESIA (OUTPATIENT)
Dept: OPERATING ROOM | Age: 69
End: 2019-08-14
Payer: COMMERCIAL

## 2019-08-14 ENCOUNTER — HOSPITAL ENCOUNTER (OUTPATIENT)
Age: 69
Setting detail: OUTPATIENT SURGERY
Discharge: HOME | End: 2019-08-14
Attending: SURGERY | Admitting: SURGERY
Payer: COMMERCIAL

## 2019-08-14 VITALS
TEMPERATURE: 97 F | SYSTOLIC BLOOD PRESSURE: 139 MMHG | HEIGHT: 68 IN | RESPIRATION RATE: 16 BRPM | OXYGEN SATURATION: 99 % | BODY MASS INDEX: 19.57 KG/M2 | HEART RATE: 74 BPM | WEIGHT: 129.1 LBS | DIASTOLIC BLOOD PRESSURE: 60 MMHG

## 2019-08-14 VITALS
DIASTOLIC BLOOD PRESSURE: 57 MMHG | OXYGEN SATURATION: 100 % | TEMPERATURE: 97.9 F | RESPIRATION RATE: 1 BRPM | SYSTOLIC BLOOD PRESSURE: 107 MMHG

## 2019-08-14 DIAGNOSIS — K40.90 NON-RECURRENT UNILATERAL INGUINAL HERNIA WITHOUT OBSTRUCTION OR GANGRENE: Primary | ICD-10-CM

## 2019-08-14 LAB
ANION GAP SERPL CALCULATED.3IONS-SCNC: 12 MMOL/L (ref 3–16)
BUN BLDV-MCNC: 16 MG/DL (ref 7–20)
CALCIUM SERPL-MCNC: 9.5 MG/DL (ref 8.3–10.6)
CHLORIDE BLD-SCNC: 104 MMOL/L (ref 99–110)
CO2: 27 MMOL/L (ref 21–32)
CREAT SERPL-MCNC: 0.9 MG/DL (ref 0.8–1.3)
GFR AFRICAN AMERICAN: >60
GFR NON-AFRICAN AMERICAN: >60
GLUCOSE BLD-MCNC: 115 MG/DL (ref 70–99)
GLUCOSE BLD-MCNC: 85 MG/DL (ref 70–99)
GLUCOSE BLD-MCNC: 90 MG/DL (ref 70–99)
HCT VFR BLD CALC: 36.2 % (ref 40.5–52.5)
HEMOGLOBIN: 12.1 G/DL (ref 13.5–17.5)
MCH RBC QN AUTO: 29.5 PG (ref 26–34)
MCHC RBC AUTO-ENTMCNC: 33.3 G/DL (ref 31–36)
MCV RBC AUTO: 88.7 FL (ref 80–100)
PDW BLD-RTO: 14.5 % (ref 12.4–15.4)
PERFORMED ON: ABNORMAL
PERFORMED ON: NORMAL
PLATELET # BLD: 206 K/UL (ref 135–450)
PMV BLD AUTO: 7.6 FL (ref 5–10.5)
POTASSIUM SERPL-SCNC: 3.8 MMOL/L (ref 3.5–5.1)
RBC # BLD: 4.08 M/UL (ref 4.2–5.9)
SODIUM BLD-SCNC: 143 MMOL/L (ref 136–145)
WBC # BLD: 5.9 K/UL (ref 4–11)

## 2019-08-14 PROCEDURE — 2580000003 HC RX 258: Performed by: UROLOGY

## 2019-08-14 PROCEDURE — 2580000003 HC RX 258: Performed by: SURGERY

## 2019-08-14 PROCEDURE — 6360000002 HC RX W HCPCS: Performed by: UROLOGY

## 2019-08-14 PROCEDURE — C1781 MESH (IMPLANTABLE): HCPCS | Performed by: SURGERY

## 2019-08-14 PROCEDURE — 49505 PRP I/HERN INIT REDUC >5 YR: CPT | Performed by: SURGERY

## 2019-08-14 PROCEDURE — 2709999900 HC NON-CHARGEABLE SUPPLY: Performed by: SURGERY

## 2019-08-14 PROCEDURE — 3700000000 HC ANESTHESIA ATTENDED CARE: Performed by: SURGERY

## 2019-08-14 PROCEDURE — 7100000000 HC PACU RECOVERY - FIRST 15 MIN: Performed by: SURGERY

## 2019-08-14 PROCEDURE — 2500000003 HC RX 250 WO HCPCS: Performed by: SURGERY

## 2019-08-14 PROCEDURE — 85027 COMPLETE CBC AUTOMATED: CPT

## 2019-08-14 PROCEDURE — 7100000011 HC PHASE II RECOVERY - ADDTL 15 MIN: Performed by: SURGERY

## 2019-08-14 PROCEDURE — 36415 COLL VENOUS BLD VENIPUNCTURE: CPT

## 2019-08-14 PROCEDURE — 7100000001 HC PACU RECOVERY - ADDTL 15 MIN: Performed by: SURGERY

## 2019-08-14 PROCEDURE — 3600000004 HC SURGERY LEVEL 4 BASE: Performed by: SURGERY

## 2019-08-14 PROCEDURE — 7100000010 HC PHASE II RECOVERY - FIRST 15 MIN: Performed by: SURGERY

## 2019-08-14 PROCEDURE — 2500000003 HC RX 250 WO HCPCS: Performed by: NURSE ANESTHETIST, CERTIFIED REGISTERED

## 2019-08-14 PROCEDURE — 88307 TISSUE EXAM BY PATHOLOGIST: CPT

## 2019-08-14 PROCEDURE — 80048 BASIC METABOLIC PNL TOTAL CA: CPT

## 2019-08-14 PROCEDURE — 3700000001 HC ADD 15 MINUTES (ANESTHESIA): Performed by: SURGERY

## 2019-08-14 PROCEDURE — 3600000014 HC SURGERY LEVEL 4 ADDTL 15MIN: Performed by: SURGERY

## 2019-08-14 PROCEDURE — 6360000002 HC RX W HCPCS: Performed by: NURSE ANESTHETIST, CERTIFIED REGISTERED

## 2019-08-14 DEVICE — MESH HERN W3XL6IN INGUINAL POLYPR MFIL RECTANG: Type: IMPLANTABLE DEVICE | Site: GROIN | Status: FUNCTIONAL

## 2019-08-14 RX ORDER — PROPOFOL 10 MG/ML
INJECTION, EMULSION INTRAVENOUS PRN
Status: DISCONTINUED | OUTPATIENT
Start: 2019-08-14 | End: 2019-08-14 | Stop reason: SDUPTHER

## 2019-08-14 RX ORDER — HYDROCODONE BITARTRATE AND ACETAMINOPHEN 5; 325 MG/1; MG/1
2 TABLET ORAL PRN
Status: DISCONTINUED | OUTPATIENT
Start: 2019-08-14 | End: 2019-08-14 | Stop reason: HOSPADM

## 2019-08-14 RX ORDER — ROCURONIUM BROMIDE 10 MG/ML
INJECTION, SOLUTION INTRAVENOUS PRN
Status: DISCONTINUED | OUTPATIENT
Start: 2019-08-14 | End: 2019-08-14 | Stop reason: SDUPTHER

## 2019-08-14 RX ORDER — BUPIVACAINE HYDROCHLORIDE AND EPINEPHRINE 5; 5 MG/ML; UG/ML
INJECTION, SOLUTION EPIDURAL; INTRACAUDAL; PERINEURAL
Status: COMPLETED | OUTPATIENT
Start: 2019-08-14 | End: 2019-08-14

## 2019-08-14 RX ORDER — MAGNESIUM HYDROXIDE 1200 MG/15ML
LIQUID ORAL
Status: COMPLETED | OUTPATIENT
Start: 2019-08-14 | End: 2019-08-14

## 2019-08-14 RX ORDER — HYDROMORPHONE HCL 110MG/55ML
0.25 PATIENT CONTROLLED ANALGESIA SYRINGE INTRAVENOUS EVERY 5 MIN PRN
Status: DISCONTINUED | OUTPATIENT
Start: 2019-08-14 | End: 2019-08-14 | Stop reason: HOSPADM

## 2019-08-14 RX ORDER — ONDANSETRON 2 MG/ML
INJECTION INTRAMUSCULAR; INTRAVENOUS PRN
Status: DISCONTINUED | OUTPATIENT
Start: 2019-08-14 | End: 2019-08-14 | Stop reason: SDUPTHER

## 2019-08-14 RX ORDER — SODIUM CHLORIDE 0.9 % (FLUSH) 0.9 %
10 SYRINGE (ML) INJECTION PRN
Status: CANCELLED | OUTPATIENT
Start: 2019-08-14

## 2019-08-14 RX ORDER — HYDROCODONE BITARTRATE AND ACETAMINOPHEN 5; 325 MG/1; MG/1
1-2 TABLET ORAL EVERY 4 HOURS PRN
Qty: 15 TABLET | Refills: 0 | Status: SHIPPED | OUTPATIENT
Start: 2019-08-14 | End: 2019-08-17

## 2019-08-14 RX ORDER — LIDOCAINE HYDROCHLORIDE 10 MG/ML
1 INJECTION, SOLUTION EPIDURAL; INFILTRATION; INTRACAUDAL; PERINEURAL
Status: CANCELLED | OUTPATIENT
Start: 2019-08-14 | End: 2019-08-14

## 2019-08-14 RX ORDER — GLYCINE 1.5 G/100ML
IRRIGANT IRRIGATION
Status: COMPLETED | OUTPATIENT
Start: 2019-08-14 | End: 2019-08-14

## 2019-08-14 RX ORDER — MEPERIDINE HYDROCHLORIDE 25 MG/ML
12.5 INJECTION INTRAMUSCULAR; INTRAVENOUS; SUBCUTANEOUS EVERY 5 MIN PRN
Status: DISCONTINUED | OUTPATIENT
Start: 2019-08-14 | End: 2019-08-14 | Stop reason: HOSPADM

## 2019-08-14 RX ORDER — FENTANYL CITRATE 50 UG/ML
INJECTION, SOLUTION INTRAMUSCULAR; INTRAVENOUS PRN
Status: DISCONTINUED | OUTPATIENT
Start: 2019-08-14 | End: 2019-08-14 | Stop reason: SDUPTHER

## 2019-08-14 RX ORDER — CIPROFLOXACIN 2 MG/ML
400 INJECTION, SOLUTION INTRAVENOUS
Status: COMPLETED | OUTPATIENT
Start: 2019-08-14 | End: 2019-08-14

## 2019-08-14 RX ORDER — FENTANYL CITRATE 50 UG/ML
50 INJECTION, SOLUTION INTRAMUSCULAR; INTRAVENOUS EVERY 5 MIN PRN
Status: DISCONTINUED | OUTPATIENT
Start: 2019-08-14 | End: 2019-08-14 | Stop reason: HOSPADM

## 2019-08-14 RX ORDER — SODIUM CHLORIDE 0.9 % (FLUSH) 0.9 %
10 SYRINGE (ML) INJECTION EVERY 12 HOURS SCHEDULED
Status: CANCELLED | OUTPATIENT
Start: 2019-08-14

## 2019-08-14 RX ORDER — EPHEDRINE SULFATE/0.9% NACL/PF 50 MG/5 ML
SYRINGE (ML) INTRAVENOUS PRN
Status: DISCONTINUED | OUTPATIENT
Start: 2019-08-14 | End: 2019-08-14 | Stop reason: SDUPTHER

## 2019-08-14 RX ORDER — GLYCOPYRROLATE 1 MG/5 ML
SYRINGE (ML) INTRAVENOUS PRN
Status: DISCONTINUED | OUTPATIENT
Start: 2019-08-14 | End: 2019-08-14 | Stop reason: SDUPTHER

## 2019-08-14 RX ORDER — DIPHENHYDRAMINE HYDROCHLORIDE 50 MG/ML
12.5 INJECTION INTRAMUSCULAR; INTRAVENOUS
Status: DISCONTINUED | OUTPATIENT
Start: 2019-08-14 | End: 2019-08-14 | Stop reason: HOSPADM

## 2019-08-14 RX ORDER — MAGNESIUM HYDROXIDE 1200 MG/15ML
LIQUID ORAL CONTINUOUS PRN
Status: COMPLETED | OUTPATIENT
Start: 2019-08-14 | End: 2019-08-14

## 2019-08-14 RX ORDER — SODIUM CHLORIDE, SODIUM LACTATE, POTASSIUM CHLORIDE, CALCIUM CHLORIDE 600; 310; 30; 20 MG/100ML; MG/100ML; MG/100ML; MG/100ML
INJECTION, SOLUTION INTRAVENOUS CONTINUOUS
Status: DISCONTINUED | OUTPATIENT
Start: 2019-08-14 | End: 2019-08-14 | Stop reason: HOSPADM

## 2019-08-14 RX ORDER — HYDROMORPHONE HCL 110MG/55ML
0.5 PATIENT CONTROLLED ANALGESIA SYRINGE INTRAVENOUS EVERY 5 MIN PRN
Status: DISCONTINUED | OUTPATIENT
Start: 2019-08-14 | End: 2019-08-14 | Stop reason: HOSPADM

## 2019-08-14 RX ORDER — SODIUM CHLORIDE 9 MG/ML
INJECTION, SOLUTION INTRAVENOUS CONTINUOUS
Status: CANCELLED | OUTPATIENT
Start: 2019-08-14

## 2019-08-14 RX ORDER — LIDOCAINE HYDROCHLORIDE 10 MG/ML
0.5 INJECTION, SOLUTION EPIDURAL; INFILTRATION; INTRACAUDAL; PERINEURAL ONCE
Status: DISCONTINUED | OUTPATIENT
Start: 2019-08-14 | End: 2019-08-14 | Stop reason: HOSPADM

## 2019-08-14 RX ORDER — PHENYLEPHRINE HCL IN 0.9% NACL 1 MG/10 ML
SYRINGE (ML) INTRAVENOUS PRN
Status: DISCONTINUED | OUTPATIENT
Start: 2019-08-14 | End: 2019-08-14 | Stop reason: SDUPTHER

## 2019-08-14 RX ORDER — PROMETHAZINE HYDROCHLORIDE 25 MG/ML
6.25 INJECTION, SOLUTION INTRAMUSCULAR; INTRAVENOUS EVERY 30 MIN PRN
Status: DISCONTINUED | OUTPATIENT
Start: 2019-08-14 | End: 2019-08-14 | Stop reason: HOSPADM

## 2019-08-14 RX ORDER — NEOSTIGMINE METHYLSULFATE 5 MG/5 ML
SYRINGE (ML) INTRAVENOUS PRN
Status: DISCONTINUED | OUTPATIENT
Start: 2019-08-14 | End: 2019-08-14 | Stop reason: SDUPTHER

## 2019-08-14 RX ORDER — FENTANYL CITRATE 50 UG/ML
25 INJECTION, SOLUTION INTRAMUSCULAR; INTRAVENOUS EVERY 5 MIN PRN
Status: DISCONTINUED | OUTPATIENT
Start: 2019-08-14 | End: 2019-08-14 | Stop reason: HOSPADM

## 2019-08-14 RX ORDER — HYDROCODONE BITARTRATE AND ACETAMINOPHEN 5; 325 MG/1; MG/1
1 TABLET ORAL PRN
Status: DISCONTINUED | OUTPATIENT
Start: 2019-08-14 | End: 2019-08-14 | Stop reason: HOSPADM

## 2019-08-14 RX ORDER — LIDOCAINE HYDROCHLORIDE 20 MG/ML
INJECTION, SOLUTION EPIDURAL; INFILTRATION; INTRACAUDAL; PERINEURAL PRN
Status: DISCONTINUED | OUTPATIENT
Start: 2019-08-14 | End: 2019-08-14 | Stop reason: SDUPTHER

## 2019-08-14 RX ADMIN — PROPOFOL 150 MG: 10 INJECTION, EMULSION INTRAVENOUS at 07:37

## 2019-08-14 RX ADMIN — Medication 100 MCG: at 07:52

## 2019-08-14 RX ADMIN — FENTANYL CITRATE 100 MCG: 50 INJECTION, SOLUTION INTRAMUSCULAR; INTRAVENOUS at 07:37

## 2019-08-14 RX ADMIN — Medication 200 MCG: at 08:32

## 2019-08-14 RX ADMIN — SODIUM CHLORIDE, POTASSIUM CHLORIDE, SODIUM LACTATE AND CALCIUM CHLORIDE: 600; 310; 30; 20 INJECTION, SOLUTION INTRAVENOUS at 06:43

## 2019-08-14 RX ADMIN — Medication 10 MG: at 08:55

## 2019-08-14 RX ADMIN — LIDOCAINE HYDROCHLORIDE 80 MG: 20 INJECTION, SOLUTION EPIDURAL; INFILTRATION; INTRACAUDAL; PERINEURAL at 07:37

## 2019-08-14 RX ADMIN — FENTANYL CITRATE 50 MCG: 50 INJECTION, SOLUTION INTRAMUSCULAR; INTRAVENOUS at 08:23

## 2019-08-14 RX ADMIN — Medication 3 MG: at 09:02

## 2019-08-14 RX ADMIN — ONDANSETRON 4 MG: 2 INJECTION INTRAMUSCULAR; INTRAVENOUS at 09:02

## 2019-08-14 RX ADMIN — Medication 200 MCG: at 08:37

## 2019-08-14 RX ADMIN — Medication 100 MCG: at 07:49

## 2019-08-14 RX ADMIN — ROCURONIUM BROMIDE 10 MG: 10 INJECTION, SOLUTION INTRAVENOUS at 08:23

## 2019-08-14 RX ADMIN — SODIUM CHLORIDE, POTASSIUM CHLORIDE, SODIUM LACTATE AND CALCIUM CHLORIDE: 600; 310; 30; 20 INJECTION, SOLUTION INTRAVENOUS at 07:30

## 2019-08-14 RX ADMIN — Medication 10 MG: at 08:51

## 2019-08-14 RX ADMIN — Medication 200 MCG: at 07:58

## 2019-08-14 RX ADMIN — CIPROFLOXACIN 400 MG: 2 INJECTION, SOLUTION INTRAVENOUS at 07:27

## 2019-08-14 RX ADMIN — Medication 0.4 MG: at 09:02

## 2019-08-14 RX ADMIN — ROCURONIUM BROMIDE 30 MG: 10 INJECTION, SOLUTION INTRAVENOUS at 07:37

## 2019-08-14 ASSESSMENT — PULMONARY FUNCTION TESTS
PIF_VALUE: 17
PIF_VALUE: 18
PIF_VALUE: 17
PIF_VALUE: 17
PIF_VALUE: 0
PIF_VALUE: 17
PIF_VALUE: 16
PIF_VALUE: 17
PIF_VALUE: 16
PIF_VALUE: 16
PIF_VALUE: 17
PIF_VALUE: 15
PIF_VALUE: 16
PIF_VALUE: 17
PIF_VALUE: 15
PIF_VALUE: 17
PIF_VALUE: 18
PIF_VALUE: 20
PIF_VALUE: 16
PIF_VALUE: 19
PIF_VALUE: 18
PIF_VALUE: 17
PIF_VALUE: 16
PIF_VALUE: 17
PIF_VALUE: 18
PIF_VALUE: 17
PIF_VALUE: 15
PIF_VALUE: 18
PIF_VALUE: 16
PIF_VALUE: 15
PIF_VALUE: 16
PIF_VALUE: 18
PIF_VALUE: 1
PIF_VALUE: 17
PIF_VALUE: 18
PIF_VALUE: 15
PIF_VALUE: 17
PIF_VALUE: 0
PIF_VALUE: 17
PIF_VALUE: 0
PIF_VALUE: 17
PIF_VALUE: 16
PIF_VALUE: 18
PIF_VALUE: 17
PIF_VALUE: 1
PIF_VALUE: 17
PIF_VALUE: 18
PIF_VALUE: 18
PIF_VALUE: 16
PIF_VALUE: 17
PIF_VALUE: 2
PIF_VALUE: 17
PIF_VALUE: 18
PIF_VALUE: 17
PIF_VALUE: 16
PIF_VALUE: 10
PIF_VALUE: 17
PIF_VALUE: 17
PIF_VALUE: 24
PIF_VALUE: 17
PIF_VALUE: 17
PIF_VALUE: 16
PIF_VALUE: 18
PIF_VALUE: 18
PIF_VALUE: 17
PIF_VALUE: 18
PIF_VALUE: 18
PIF_VALUE: 20
PIF_VALUE: 17
PIF_VALUE: 18
PIF_VALUE: 2
PIF_VALUE: 0
PIF_VALUE: 15
PIF_VALUE: 15
PIF_VALUE: 17
PIF_VALUE: 4
PIF_VALUE: 16
PIF_VALUE: 17
PIF_VALUE: 18
PIF_VALUE: 17
PIF_VALUE: 16
PIF_VALUE: 17
PIF_VALUE: 17
PIF_VALUE: 18
PIF_VALUE: 17
PIF_VALUE: 17
PIF_VALUE: 18
PIF_VALUE: 16
PIF_VALUE: 17
PIF_VALUE: 8
PIF_VALUE: 16

## 2019-08-14 ASSESSMENT — PAIN SCALES - GENERAL: PAINLEVEL_OUTOF10: 0

## 2019-08-14 ASSESSMENT — COPD QUESTIONNAIRES: CAT_SEVERITY: MODERATE

## 2019-08-14 NOTE — PROGRESS NOTES
Patient/report received from Juju Higgins PACU RN, vss, a/o, denies pain/needs at present, call light in reach, family at bedside, will continue to monitor

## 2019-08-14 NOTE — H&P
Take 81 mg by mouth daily    Historical Provider, MD       Active Problems:    * No active hospital problems. *  Resolved Problems:    * No resolved hospital problems. *      Blood pressure (!) 163/94, pulse 90, temperature 97 °F (36.1 °C), temperature source Temporal, resp. rate 18, height 5' 8\" (1.727 m), weight 129 lb 1.6 oz (58.6 kg), SpO2 100 %. Review of Systems    Physical Exam   Cardiovascular: Normal rate and regular rhythm.    Pulmonary/Chest: Effort normal and breath sounds normal.       Assessment:  LIH    Plan:  Open LIH repair with mesh     Madhav Murguia MD  8/14/2019

## 2019-08-14 NOTE — PROGRESS NOTES
CLINICAL PHARMACY NOTE: MEDS TO [address] Select Patient?: No  Total # of Prescriptions Filled: 1   The following medications were delivered to the patient:  · NORCO 5-325 mg tabs  Total # of Interventions Completed: 0  Time Spent (min): 15    Additional Documentation:    Patient signed and paid for one Rx bedside.

## 2019-08-14 NOTE — ANESTHESIA PRE PROCEDURE
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    Lipoma of other specified sites D17.79    Allergic rhinitis J30.9    Mixed hyperlipidemia E78.2    Type 2 diabetes mellitus without complication, without long-term current use of insulin ([address] E11.9    Essential hypertension I10    Vitamin D deficiency E55.9    RLS (restless legs syndrome) G25.81    Gout M10.9    Lung mass R91.8    Primary cancer of right middle lobe of lung (HCC) C34.2    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    Postprocedural pneumothorax J95.811    Squamous cell lung cancer ([address] C34.90    Dyslipidemia E78.5    Atelectasis of right lung J98.11    Chronic bronchitis ([address] J42    Lung cancer, main bronchus (HCC) C34.00    COPD, moderate ([address] J44.9    Hyperglycemia without ketosis R73.9    Chest pain R07.9    Abnormal echocardiogram R93.1    Gastroesophageal reflux disease without esophagitis K21.9    Pleural effusion, bilateral J90    Acute pulmonary edema (HCC) N49.8    Acute systolic (congestive) heart failure (HCC) I50.21       Past Medical History:        Diagnosis Date    Allergic rhinitis, cause unspecified 7/15/2010    Cancer ([address]     lung    Colon polyps     Diabetes mellitus ([address]     Essential hypertension, benign 7/15/2010    HYPERCHOLESTERAEMIA     Hypertension     Lipoma of other specified sites 7/15/2010    Other abnormal blood chemistry 7/15/2010    Other and unspecified hyperlipidemia 7/15/2010    Other symptoms involving cardiovascular system 7/15/2010    Psychosexual dysfunction with inhibited sexual excitement 7/15/2010       Past Surgical History:  08/14/2019       CMP:   Lab Results   Component Value Date     08/14/2019    K 3.8 08/14/2019    K 3.9 12/19/2018     08/14/2019    CO2 27 08/14/2019    BUN 16 08/14/2019    CREATININE 0.9 08/14/2019    GFRAA >60 08/14/2019    GFRAA >60 10/27/2012    AGRATIO 1.1 06/17/2019    LABGLOM >60 08/14/2019    LABGLOM 86 03/28/2017    GLUCOSE 90 08/14/2019    GLUCOSE 150 06/29/2017    PROT 8.1 06/17/2019    PROT 8.1 06/29/2017    CALCIUM 9.5 08/14/2019    BILITOT 0.5 06/17/2019    ALKPHOS 158 06/17/2019    AST 15 06/17/2019    ALT 10 06/17/2019       POC Tests:   Recent Labs     08/14/19  0646   POCGLU 85       Coags:   Lab Results   Component Value Date    PROTIME 10.6 04/11/2018    INR 0.94 04/11/2018    APTT 26.4 04/11/2018       HCG (If Applicable): No results found for: PREGTESTUR, PREGSERUM, HCG, HCGQUANT     ABGs: No results found for: PHART, PO2ART, ACV8JAL, POY9WMK, BEART, H9ZGHIDX     Type & Screen (If Applicable):  No results found for: LABABO, [address]    Anesthesia Evaluation  Patient summary reviewed and Nursing notes reviewed  Airway: Mallampati: II  TM distance: >3 FB   Neck ROM: full  Mouth opening: > = 3 FB Dental:          Pulmonary:   (+) COPD: moderate,                            ROS comment: LUNG CA HAD RADIATION AND CHEMO  PLEURAL EFFUSION   Cardiovascular:  Exercise tolerance: good (>4 METS),   (+) hypertension: moderate, CHF:,                ROS comment: ECHO  Technically difficult examination. Lung Cancer.   -Normal left ventricular cavity size and wall thickness.   -Moderately reduced systolic function with an ejection fraction of 40-45%.  -No obvious regional wall motion abnormalities noted.   -Normal diastolic function.   - Mild mitral regurgitation.   -There is sclerotic nodular thickening of the aortic valve cusps.    -Thickening on the tricuspid annulus.   -Trivial tricuspid regurgitation with a RVSP of 29 mmHg.        Neuro/Psych:               GI/Hepatic/Renal:   (+) GERD:

## 2019-08-15 NOTE — OP NOTE
free of the pubic  tubercle and encircled with a Penrose drain. The patient had a  moderate-sized indirect inguinal hernia sac. There were inflammatory  changes along the cord making dissection more difficult than usual.  The  sac was dissected down to its base. The sac had omentum adherent to the  sac wall. We elected to reduce the sac and mass. A 2-0 Vicryl was used  to decrease the size of the internal ring. We selected a 3 x 6 piece of polypropylene mesh. The mesh was rounded  inferiorly and cut with a slit from the superior direction. The mesh  was then sutured in place with interrupted 0-Prolene suture starting at  the level of the pubic tubercle. Several interrupted tacking sutures  were placed along the shelving edge of the inguinal ligament. Several  interrupted tacking sutures were also placed medially. The patch was  approximated around the cord with an 0-Prolene suture. This was checked  to make sure it was snug, but not too tight. The tails of mesh were  tucked up underneath the external abdominal oblique. The external  abdominal oblique was closed with running 2-0 Vicryl suture. The fascia  was injected with 0.5% Marcaine with epinephrine. The subcutaneous  layer was closed with interrupted 3-0 Vicryl sutures followed by running  4-0 subcuticular suture in the skin. Dermabond was then applied. Please refer to Dr. Austin Urena note separately.         Brice Salcido MD    D: 08/14/2019 9:03:01       T: 08/14/2019 9:09:56     [email or web address]  Job#: [id number]     Doc#: [id number]    CC:  MD Dianne Ribeiro MD

## 2019-08-28 ENCOUNTER — OFFICE VISIT (OUTPATIENT)
Dept: SURGERY | Age: 69
End: 2019-08-28

## 2019-08-28 VITALS — WEIGHT: 125 LBS | BODY MASS INDEX: 19.01 KG/M2 | SYSTOLIC BLOOD PRESSURE: 150 MMHG | DIASTOLIC BLOOD PRESSURE: 82 MMHG

## 2019-08-28 DIAGNOSIS — K40.90 NON-RECURRENT UNILATERAL INGUINAL HERNIA WITHOUT OBSTRUCTION OR GANGRENE: Primary | ICD-10-CM

## 2019-08-28 PROCEDURE — 99024 POSTOP FOLLOW-UP VISIT: CPT | Performed by: SURGERY

## 2019-08-28 NOTE — COMMUNICATION BODY
Assessment:  70-year-old male status post open left inguinal hernia repair with mesh. Doing well postoperatively. Plan:  Continue lifting restrictions for 2 more weeks. Follow-up as needed.

## 2019-09-04 ENCOUNTER — TELEPHONE (OUTPATIENT)
Dept: CASE MANAGEMENT | Age: 69
End: 2019-09-04

## 2019-09-11 ENCOUNTER — HOSPITAL ENCOUNTER (OUTPATIENT)
Age: 69
Discharge: HOME | End: 2019-09-11
Payer: COMMERCIAL

## 2019-09-11 DIAGNOSIS — E78.2 MIXED HYPERLIPIDEMIA: ICD-10-CM

## 2019-09-11 DIAGNOSIS — J44.9 COPD, MODERATE (HCC): ICD-10-CM

## 2019-09-11 DIAGNOSIS — C77.1 SECONDARY MALIGNANT NEOPLASM OF INTRATHORACIC LYMPH NODES (HCC): ICD-10-CM

## 2019-09-11 DIAGNOSIS — C34.2 PRIMARY CANCER OF RIGHT MIDDLE LOBE OF LUNG (HCC): ICD-10-CM

## 2019-09-11 DIAGNOSIS — I10 ESSENTIAL HYPERTENSION: ICD-10-CM

## 2019-09-11 DIAGNOSIS — G25.81 RLS (RESTLESS LEGS SYNDROME): ICD-10-CM

## 2019-09-11 DIAGNOSIS — J90 PLEURAL EFFUSION, BILATERAL: ICD-10-CM

## 2019-09-11 DIAGNOSIS — I50.22 CHRONIC SYSTOLIC HEART FAILURE (HCC): ICD-10-CM

## 2019-09-11 DIAGNOSIS — C34.91 SQUAMOUS CELL CARCINOMA OF RIGHT LUNG (HCC): ICD-10-CM

## 2019-09-11 DIAGNOSIS — E11.9 TYPE 2 DIABETES MELLITUS WITHOUT COMPLICATION, WITHOUT LONG-TERM CURRENT USE OF INSULIN (HCC): ICD-10-CM

## 2019-09-11 DIAGNOSIS — M1A.0490 IDIOPATHIC CHRONIC GOUT OF HAND WITHOUT TOPHUS, UNSPECIFIED LATERALITY: ICD-10-CM

## 2019-09-11 DIAGNOSIS — E55.9 VITAMIN D DEFICIENCY: ICD-10-CM

## 2019-09-11 LAB
A/G RATIO: 1.3 (ref 1.1–2.2)
ALBUMIN SERPL-MCNC: 4.6 G/DL (ref 3.4–5)
ALP BLD-CCNC: 172 U/L (ref 40–129)
ALT SERPL-CCNC: 76 U/L (ref 10–40)
ANION GAP SERPL CALCULATED.3IONS-SCNC: 14 MMOL/L (ref 3–16)
AST SERPL-CCNC: 49 U/L (ref 15–37)
BASOPHILS ABSOLUTE: 0.1 K/UL (ref 0–0.2)
BASOPHILS RELATIVE PERCENT: 0.6 %
BILIRUB SERPL-MCNC: <0.2 MG/DL (ref 0–1)
BUN BLDV-MCNC: 11 MG/DL (ref 7–20)
CALCIUM SERPL-MCNC: 10.2 MG/DL (ref 8.3–10.6)
CHLORIDE BLD-SCNC: 100 MMOL/L (ref 99–110)
CHOLESTEROL, TOTAL: 123 MG/DL (ref 0–199)
CO2: 25 MMOL/L (ref 21–32)
CREAT SERPL-MCNC: 0.9 MG/DL (ref 0.8–1.3)
EOSINOPHILS ABSOLUTE: 0.2 K/UL (ref 0–0.6)
EOSINOPHILS RELATIVE PERCENT: 2.4 %
GFR AFRICAN AMERICAN: >60
GFR NON-AFRICAN AMERICAN: >60
GLOBULIN: 3.6 G/DL
GLUCOSE BLD-MCNC: 158 MG/DL (ref 70–99)
HCT VFR BLD CALC: 33.5 % (ref 40.5–52.5)
HDLC SERPL-MCNC: 42 MG/DL (ref 40–60)
HEMOGLOBIN: 11.3 G/DL (ref 13.5–17.5)
LDL CHOLESTEROL CALCULATED: 45 MG/DL
LYMPHOCYTES ABSOLUTE: 1 K/UL (ref 1–5.1)
LYMPHOCYTES RELATIVE PERCENT: 12.5 %
MCH RBC QN AUTO: 29.7 PG (ref 26–34)
MCHC RBC AUTO-ENTMCNC: 33.8 G/DL (ref 31–36)
MCV RBC AUTO: 87.9 FL (ref 80–100)
MONOCYTES ABSOLUTE: 0.6 K/UL (ref 0–1.3)
MONOCYTES RELATIVE PERCENT: 7.7 %
NEUTROPHILS ABSOLUTE: 6.4 K/UL (ref 1.7–7.7)
NEUTROPHILS RELATIVE PERCENT: 76.8 %
PDW BLD-RTO: 13.9 % (ref 12.4–15.4)
PLATELET # BLD: 209 K/UL (ref 135–450)
PMV BLD AUTO: 8.3 FL (ref 5–10.5)
POTASSIUM SERPL-SCNC: 4.8 MMOL/L (ref 3.5–5.1)
PRO-BNP: 205 PG/ML (ref 0–124)
RBC # BLD: 3.81 M/UL (ref 4.2–5.9)
SODIUM BLD-SCNC: 139 MMOL/L (ref 136–145)
TOTAL PROTEIN: 8.2 G/DL (ref 6.4–8.2)
TRIGL SERPL-MCNC: 181 MG/DL (ref 0–150)
TSH REFLEX: 1.84 UIU/ML (ref 0.27–4.2)
URIC ACID, SERUM: 7 MG/DL (ref 3.5–7.2)
VLDLC SERPL CALC-MCNC: 36 MG/DL
WBC # BLD: 8.3 K/UL (ref 4–11)

## 2019-09-11 PROCEDURE — 85025 COMPLETE CBC W/AUTO DIFF WBC: CPT

## 2019-09-11 PROCEDURE — 84443 ASSAY THYROID STIM HORMONE: CPT

## 2019-09-11 PROCEDURE — 80061 LIPID PANEL: CPT

## 2019-09-11 PROCEDURE — 83880 ASSAY OF NATRIURETIC PEPTIDE: CPT

## 2019-09-11 PROCEDURE — 80053 COMPREHEN METABOLIC PANEL: CPT

## 2019-09-11 PROCEDURE — 36415 COLL VENOUS BLD VENIPUNCTURE: CPT

## 2019-09-11 PROCEDURE — 83036 HEMOGLOBIN GLYCOSYLATED A1C: CPT

## 2019-09-11 PROCEDURE — 84550 ASSAY OF BLOOD/URIC ACID: CPT

## 2019-09-12 LAB
ESTIMATED AVERAGE GLUCOSE: 182.9 MG/DL
HBA1C MFR BLD: 8 %

## 2019-09-17 ENCOUNTER — OFFICE VISIT (OUTPATIENT)
Dept: INTERNAL MEDICINE CLINIC | Age: 69
End: 2019-09-17
Payer: COMMERCIAL

## 2019-09-17 VITALS
RESPIRATION RATE: 14 BRPM | SYSTOLIC BLOOD PRESSURE: 118 MMHG | HEIGHT: 68 IN | BODY MASS INDEX: 19.7 KG/M2 | DIASTOLIC BLOOD PRESSURE: 78 MMHG | HEART RATE: 68 BPM | WEIGHT: 130 LBS

## 2019-09-17 DIAGNOSIS — Z00.00 ROUTINE GENERAL MEDICAL EXAMINATION AT A HEALTH CARE FACILITY: ICD-10-CM

## 2019-09-17 DIAGNOSIS — C34.2 PRIMARY CANCER OF RIGHT MIDDLE LOBE OF LUNG (HCC): ICD-10-CM

## 2019-09-17 DIAGNOSIS — I10 ESSENTIAL HYPERTENSION: ICD-10-CM

## 2019-09-17 DIAGNOSIS — K21.9 GASTROESOPHAGEAL REFLUX DISEASE WITHOUT ESOPHAGITIS: ICD-10-CM

## 2019-09-17 DIAGNOSIS — E78.2 MIXED HYPERLIPIDEMIA: ICD-10-CM

## 2019-09-17 DIAGNOSIS — C34.01 MALIGNANT NEOPLASM OF RIGHT MAIN BRONCHUS (HCC): ICD-10-CM

## 2019-09-17 DIAGNOSIS — J44.9 COPD, MODERATE (HCC): ICD-10-CM

## 2019-09-17 DIAGNOSIS — E55.9 VITAMIN D DEFICIENCY: ICD-10-CM

## 2019-09-17 DIAGNOSIS — J30.2 SEASONAL ALLERGIC RHINITIS, UNSPECIFIED TRIGGER: Chronic | ICD-10-CM

## 2019-09-17 DIAGNOSIS — G25.81 RLS (RESTLESS LEGS SYNDROME): ICD-10-CM

## 2019-09-17 DIAGNOSIS — Z00.00 WELL ADULT EXAM: Primary | ICD-10-CM

## 2019-09-17 DIAGNOSIS — E11.9 TYPE 2 DIABETES MELLITUS WITHOUT COMPLICATION, WITHOUT LONG-TERM CURRENT USE OF INSULIN (HCC): ICD-10-CM

## 2019-09-17 DIAGNOSIS — I50.42 CHRONIC COMBINED SYSTOLIC AND DIASTOLIC CONGESTIVE HEART FAILURE (HCC): ICD-10-CM

## 2019-09-17 DIAGNOSIS — M1A.0490 IDIOPATHIC CHRONIC GOUT OF HAND WITHOUT TOPHUS, UNSPECIFIED LATERALITY: ICD-10-CM

## 2019-09-17 DIAGNOSIS — Z23 NEED FOR INFLUENZA VACCINATION: ICD-10-CM

## 2019-09-17 PROCEDURE — G0438 PPPS, INITIAL VISIT: HCPCS | Performed by: INTERNAL MEDICINE

## 2019-09-17 PROCEDURE — 90471 IMMUNIZATION ADMIN: CPT | Performed by: INTERNAL MEDICINE

## 2019-09-17 PROCEDURE — 90653 IIV ADJUVANT VACCINE IM: CPT | Performed by: INTERNAL MEDICINE

## 2019-09-17 PROCEDURE — 99214 OFFICE O/P EST MOD 30 MIN: CPT | Performed by: INTERNAL MEDICINE

## 2019-09-17 ASSESSMENT — PATIENT HEALTH QUESTIONNAIRE - PHQ9
SUM OF ALL RESPONSES TO PHQ QUESTIONS 1-9: 0
SUM OF ALL RESPONSES TO PHQ QUESTIONS 1-9: 0

## 2019-09-17 ASSESSMENT — ENCOUNTER SYMPTOMS
ALLERGIC/IMMUNOLOGIC NEGATIVE: 1
EYES NEGATIVE: 1
RHINORRHEA: 1
GASTROINTESTINAL NEGATIVE: 1
RESPIRATORY NEGATIVE: 1

## 2019-09-17 ASSESSMENT — LIFESTYLE VARIABLES
HOW MANY STANDARD DRINKS CONTAINING ALCOHOL DO YOU HAVE ON A TYPICAL DAY: 0
HOW OFTEN DO YOU HAVE SIX OR MORE DRINKS ON ONE OCCASION: 0
HOW OFTEN DO YOU HAVE A DRINK CONTAINING ALCOHOL: 1
AUDIT-C TOTAL SCORE: 1

## 2019-09-17 NOTE — PROGRESS NOTES
Subjective:      Patient ID: Yariel Garcia is a 77 y.o. male. HPI  Here today for  Medicare complete physical and review of chronic problems as listed under assessment and plan,no new c/o feels good     Hyperlipidemia   This is a chronic problem. The current episode started more than 1 year ago. The problem is controlled. Recent lipid tests were reviewed and are low. Exacerbating diseases include diabetes. There are no known factors aggravating his hyperlipidemia. Pertinent negatives include no chest pain. Current antihyperlipidemic treatment includes diet change, exercise and statins. The current treatment provides significant improvement of lipids. There are no compliance problems. Risk factors for coronary artery disease include dyslipidemia, diabetes mellitus and hypertension. Hypertension   This is a chronic problem. The current episode started more than 1 year ago. The problem has been waxing and waning since onset. The problem is controlled. Pertinent negatives include no chest pain, headaches or palpitations. Risk factors for coronary artery disease include diabetes mellitus, male gender and dyslipidemia. Past treatments include ACE inhibitors, calcium channel blockers, diuretics, beta blockers and lifestyle changes. The current treatment provides significant improvement. There are no compliance problems. Hypertensive end-organ damage includes heart failure. Diabetes   He presents for his follow-up diabetic visit. He has type 2 diabetes mellitus. His disease course has been fluctuating. There are no hypoglycemic associated symptoms. Pertinent negatives for hypoglycemia include no headaches. Pertinent negatives for diabetes include no chest pain, no foot paresthesias and no polyphagia. Symptoms are stable. Diabetic complications include heart disease. Risk factors for coronary artery disease include diabetes mellitus, dyslipidemia and hypertension.  Current diabetic treatment includes diet, insulin injections and oral agent (monotherapy). He is compliant with treatment all of the time. His weight is stable. He is following a diabetic, low fat/cholesterol and low salt diet. Meal planning includes avoidance of concentrated sweets. He participates in exercise three times a week. His home blood glucose trend is fluctuating minimally. An ACE inhibitor/angiotensin II receptor blocker is being taken. Eye exam is current. No Known Allergies    Current Outpatient Medications   Medication Sig Dispense Refill    carvedilol (COREG) 12.5 MG tablet Take 1 tablet by mouth 2 times daily (with meals) 60 tablet 3    furosemide (LASIX) 20 MG tablet Take 1 tablet by mouth daily 30 tablet 2    rosuvastatin (CRESTOR) 40 MG tablet Take 1 tablet by mouth every evening 90 tablet 1    insulin glargine (LANTUS) 100 UNIT/ML injection pen Inject 20 Units into the skin nightly Indications: 18-20 units at hs 18 mL 3    lisinopril (PRINIVIL;ZESTRIL) 40 MG tablet TAKE 1 TABLET BY MOUTH EVERY DAY 90 tablet 2    albuterol sulfate HFA (PROAIR HFA) 108 (90 Base) MCG/ACT inhaler Inhale 2 puffs into the lungs every 6 hours as needed for Wheezing 1 Inhaler 3    omeprazole (PRILOSEC) 20 MG delayed release capsule Take 20 mg by mouth as needed       metFORMIN (GLUCOPHAGE) 500 MG tablet TAKE 2 TABLETS BY MOUTH 2 TIMES DAILY (WITH MEALS) 360 tablet 2    amLODIPine (NORVASC) 10 MG tablet TAKE 1 TABLET BY MOUTH EVERY DAY 90 tablet 0    aspirin 81 MG tablet Take 81 mg by mouth daily       No current facility-administered medications for this visit.         Past Medical History:   Diagnosis Date    Allergic rhinitis, cause unspecified 7/15/2010    Cancer ([facility]     lung    Colon polyps     Diabetes mellitus ([facility]     Essential hypertension, benign 7/15/2010    HYPERCHOLESTERAEMIA     Hypertension     Lipoma of other specified sites 7/15/2010    Other abnormal blood chemistry 7/15/2010    Other and unspecified hyperlipidemia 7/15/2010  Other symptoms involving cardiovascular system 7/15/2010    Psychosexual dysfunction with inhibited sexual excitement 7/15/2010       Family History   Problem Relation Age of Onset    Breast Cancer Mother        Past Surgical History:   Procedure Laterality Date    BRONCHOSCOPY Right 2017    Dr. Oralia Kraus - w/R BI stent placement size 12x3    BRONCHOSCOPY  2017    FAVIO Aguilar - w/EBUS    BRONCHOSCOPY  2017    COLONOSCOPY  10/31/2007    polyps    COLONOSCOPY  2012    polyp- repeat 2017    CYSTOSCOPY N/A 2019    CYSTOSCOPY TRANSURETHRAL RESECTION BLADDER performed by Dolores Akbar MD at [place] [address] Left 2019    OPEN LEFT INGUINAL HERNIA REPAIR WITH MESH performed by Kimmy Sequeira MD at [address] [place] [address] Right 1975       Social History     Socioeconomic History    Marital status:      Spouse name: Not on file    Number of children: Not on file    Years of education: Not on file    Highest education level: Not on file   Occupational History    Not on file   Social Needs    Financial resource strain: Not on file    Food insecurity:     Worry: Not on file     Inability: Not on file    Transportation needs:     Medical: Not on file     Non-medical: Not on file   Tobacco Use    Smoking status: Former Smoker     Packs/day: 0.50     Years: 40.00     Pack years: 20.00     Types: Cigarettes     Start date: 1965     Last attempt to quit: 2017     Years since quittin.4    Smokeless tobacco: Never Used    Tobacco comment:  To try gum or patch to start quiting classes    Substance and Sexual Activity    Alcohol use: Yes     Frequency: Monthly or less     Comment: rare    Drug use: Not Currently     Types: Marijuana    Sexual activity: Yes     Partners: Female     Comment: on occasion   Lifestyle    Physical activity:     Days per week: Not on file     Minutes per session: Not on file    Stress: Not on file History   Problem Relation Age of Onset    Breast Cancer Mother      Social History     Socioeconomic History    Marital status:      Spouse name: Not on file    Number of children: Not on file    Years of education: Not on file    Highest education level: Not on file   Occupational History    Not on file   Social Needs    Financial resource strain: Not on file    Food insecurity:     Worry: Not on file     Inability: Not on file    Transportation needs:     Medical: Not on file     Non-medical: Not on file   Tobacco Use    Smoking status: Former Smoker     Packs/day: 0.50     Years: 40.00     Pack years: 20.00     Types: Cigarettes     Start date: 1965     Last attempt to quit: 2017     Years since quittin.4    Smokeless tobacco: Never Used    Tobacco comment: To try gum or patch to start quiting classes    Substance and Sexual Activity    Alcohol use: Yes     Frequency: Monthly or less     Comment: rare    Drug use: Not Currently     Types: Marijuana    Sexual activity: Yes     Partners: Female     Comment: on occasion   Lifestyle    Physical activity:     Days per week: Not on file     Minutes per session: Not on file    Stress: Not on file   Relationships    Social connections:     Talks on phone: Not on file     Gets together: Not on file     Attends Confucianism service: Not on file     Active member of club or organization: Not on file     Attends meetings of clubs or organizations: Not on file     Relationship status: Not on file    Intimate partner violence:     Fear of current or ex partner: Not on file     Emotionally abused: Not on file     Physically abused: Not on file     Forced sexual activity: Not on file   Other Topics Concern    Not on file   Social History Narrative    Not on file     }        Visual Acuity: Corrected:            L  20/20            R 20/20    Cognitive Screening:  Clock drawing test score: 5/5. Mini-mental status exam score: not needed. Assessment/Plan:  Luis Rivero was seen today for medicare awv and discuss labs. 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 Ethnicity: Non-/Non    : 1950 Race: Luke Lam is here for Medicare AWV and Discuss Labs    Screenings for behavioral, psychosocial and functional/safety risks, and cognitive dysfunction are all negative except as indicated below. These results, as well as other patient data from the [address] [facility] [address] form, are documented in Flowsheets linked to this Encounter. No Known Allergies  Prior to Visit Medications    Medication Sig Taking?  Authorizing Provider   carvedilol (COREG) 12.5 MG tablet Take 1 tablet by mouth 2 times daily (with meals) Yes NIDIA Gomez - CNP   furosemide (LASIX) 20 MG tablet Take 1 tablet by mouth daily Yes Robby Brambila APRN - CNP   rosuvastatin (CRESTOR) 40 MG tablet Take 1 tablet by mouth every evening Yes Brittany Lugo MD   insulin glargine (LANTUS) 100 UNIT/ML injection pen Inject 20 Units into the skin nightly Indications: 18-20 units at hs Yes Juan Deya Us MD   lisinopril (PRINIVIL;ZESTRIL) 40 MG tablet TAKE 1 TABLET BY MOUTH EVERY DAY Yes Dean Landaverde MD   albuterol sulfate HFA (PROAIR HFA) 108 (90 Base) MCG/ACT inhaler Inhale 2 puffs into the lungs every 6 hours as needed for Wheezing Yes Dean Landaverde MD   omeprazole (PRILOSEC) 20 MG delayed release capsule Take 20 mg by mouth as needed  Yes Historical Provider, MD   metFORMIN (GLUCOPHAGE) 500 MG tablet TAKE 2 TABLETS BY MOUTH 2 TIMES DAILY (WITH MEALS) Yes Dean Landaverde MD   amLODIPine (NORVASC) 10 MG tablet TAKE 1 TABLET BY MOUTH Eunice Little Yes Dean Landaverde MD   aspirin 81 MG tablet Take 81 mg by mouth daily Yes Historical Provider, MD     Past Medical History:   Diagnosis Date    Allergic rhinitis, cause unspecified 7/15/2010    [facility]     lung    Colon polyps     Diabetes mellitus ([facility]     Essential hypertension, benign 7/15/2010    HYPERCHOLESTERAEMIA     Hypertension     Lipoma of other specified sites 7/15/2010    Other abnormal blood chemistry 7/15/2010    Other and unspecified hyperlipidemia 7/15/2010    Other symptoms involving cardiovascular system 7/15/2010    Psychosexual dysfunction with inhibited sexual excitement 7/15/2010     Past Surgical History:   Procedure Laterality Date    BRONCHOSCOPY Right 04/27/2017    Dr. Wilmer Nguyen - w/R BI stent placement size 12x3    BRONCHOSCOPY  04/24/2017    FAVIO Aguilar - w/EBUS    BRONCHOSCOPY  09/12/2017    COLONOSCOPY  10/31/2007    polyps    COLONOSCOPY  2012    polyp- repeat 2017    CYSTOSCOPY N/A 8/14/2019    CYSTOSCOPY TRANSURETHRAL RESECTION BLADDER performed by Jinny Najjar, MD at [facility] Left 8/14/2019    OPEN LEFT INGUINAL HERNIA REPAIR WITH MESH performed by Donna Lea MD at [address] [place] [address] Right 1975     Family History   Problem Relation Age of Onset    Breast Cancer Mother        CareTeam (Including outside providers/suppliers regularly involved in providing

## 2019-09-23 ENCOUNTER — HOSPITAL ENCOUNTER (OUTPATIENT)
Dept: CT IMAGING | Age: 69
Discharge: HOME | End: 2019-09-23
Payer: COMMERCIAL

## 2019-09-23 ENCOUNTER — OFFICE VISIT (OUTPATIENT)
Dept: PULMONOLOGY | Age: 69
End: 2019-09-23
Payer: COMMERCIAL

## 2019-09-23 VITALS
RESPIRATION RATE: 18 BRPM | SYSTOLIC BLOOD PRESSURE: 132 MMHG | OXYGEN SATURATION: 99 % | HEIGHT: 68 IN | BODY MASS INDEX: 19.82 KG/M2 | DIASTOLIC BLOOD PRESSURE: 68 MMHG | HEART RATE: 93 BPM | WEIGHT: 130.8 LBS

## 2019-09-23 DIAGNOSIS — R91.8 PULMONARY NODULES/LESIONS, MULTIPLE: ICD-10-CM

## 2019-09-23 DIAGNOSIS — C34.91 SQUAMOUS CELL CARCINOMA OF RIGHT LUNG (HCC): ICD-10-CM

## 2019-09-23 DIAGNOSIS — C34.81 MALIGNANT NEOPLASM OF OVERLAPPING SITES OF RIGHT LUNG (HCC): Primary | ICD-10-CM

## 2019-09-23 DIAGNOSIS — C34.81 MALIGNANT NEOPLASM OF OVERLAPPING SITES OF RIGHT LUNG (HCC): ICD-10-CM

## 2019-09-23 PROCEDURE — 71250 CT THORAX DX C-: CPT

## 2019-09-23 PROCEDURE — 99213 OFFICE O/P EST LOW 20 MIN: CPT | Performed by: INTERNAL MEDICINE

## 2019-09-23 NOTE — PROGRESS NOTES
(GLUCOPHAGE) 500 MG tablet TAKE 2 TABLETS BY MOUTH 2 TIMES DAILY (WITH MEALS) 360 tablet 2    amLODIPine (NORVASC) 10 MG tablet TAKE 1 TABLET BY MOUTH EVERY DAY 90 tablet 0    aspirin 81 MG tablet Take 81 mg by mouth daily       No current facility-administered medications on file prior to visit. REVIEW OF SYSTEMS:    CONSTITUTIONAL: Negative for fevers and chills  HEENT: Negative for oropharyngeal exudate, post nasal drip, sinus pain / pressure, nasal congestion, ear pain  RESPIRATORY:  See HPI  CARDIOVASCULAR: Negative for chest pain, palpitations, edema  GASTROINTESTINAL: Negative for nausea, vomiting, diarrhea, constipation and abdominal pain  HEMATOLOGICAL: Negative for adenopathy  SKIN: Negative for clubbing, cyanosis, skin lesions  EXTREMITIES: Negative for weakness, decreased ROM  NEUROLOGICAL: Negative for unilateral weakness, speech or gait abnormalities  PSYCH: Negative for anxiety, depression    Objective:   PHYSICAL EXAM:        VITALS:  Wt 130 lb 12.8 oz (59.3 kg)   BMI 19.89 kg/m²     CONSTITUTIONAL:  Awake, alert, cooperative, no apparent distress, and appears stated age  HEENT: No oropharyngeal exudate, PERRL, no cervical adenopathy, no tracheal deviation, thyroid size normal  LUNGS:  No increased work of breathing and clear to auscultation, no crackles. No wheezing. Transmitted breath sounds in RUL  CARDIOVASCULAR:  normal S1 and S2 and no JVD  ABDOMEN:  Normal bowel sounds, non-distended and non-tender to palpation  EXT: No edema, no calf tenderness. Pulses are present bilaterally. NEUROLOGIC:  Mental Status Exam:  Level of Alertness:   awake  Orientation:   person, place, time. SKIN:  normal skin color, texture, turgor, no redness, warmth, or swelling, with the exception of the area around his right scapula which has small areas of erythema and hyperpigmentation. DATA:      Radiology Review:  Pertinent images / reports were reviewed as a part of this visit.   Chest x-ray reveals the following:     Post procedural chest film shows the small bore chest tube   extending to the lung apex with virtually complete reexpansion of   the right lung following manual evacuation of pneumothorax. Consolidated lung in the right upper chest is consistent with   atelectasis. Trachea is midline.       Impression:       Chest tube placement with reexpansion of the right lung         3/18  No change since 1/24/2018, with extensive atelectasis in the right   lung and soft tissue thickening in the right hilar region. 10/18  Impression       1.  Posttherapeutic changes reidentified in the right upper lobe including confluent airspace disease posteriorly into the superior segment of the lower lobe, similar to the previous study. There are slightly increased groundglass and reticular opacities    anteriorly in the upper lobe, which are nonspecific but could represent ongoing fibrosis versus infection or inflammation. Attention on follow-up.       2.  The small amount of loculated right apical pleural air on the previous study has been replaced with a small amount of pleural fluid.       3.  Borderline sized right paraesophageal lymph node, increased from previous study. 3/2019  Impression       1. Posttherapy changes noted with consolidation and bronchiectasis right apex.         No evidence recurrent or residual neoplasm.       Subcentimeter pretracheal lymph node unchanged benign. No significant lymphadenopathy.       Coronary artery calcification which is a risk factor for acute coronary syndrome.       Mild compression fracture superior endplate T7 with adjacent bony sclerosis may relate to posttraumatic or osteoporotic compression fracture. Pathologic compression fracture is not excluded. Continued follow-up recommended.  Findings present previously     9/2019  Impression       Consolidation/atelectasis and volume loss in the right thorax without change.       Chronic pancreatitis.       Stable small left pulmonary nodules. Last PFTs: CONCLUSION:  Moderate obstructive defect without bronchodilator  response. Air trapping present and moderate decrease in diffusion. These findings are most consistent with the diagnosis of COPD. Assessment: This is a 77 y.o. 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 Has a rescue inhaler. CT in December showed no growth, but no shrinkage in the esophageal lymph node and was only 3 weeks between scan. Repeat scan today shows no changes. Lymph node was first seen in October so has been stable for 11 months. Repeat scan in 6 months give history of malignancy. CHF:  Didn't get repeat echo this past April. On coreg, lisinopril, norvasc and lasix. The patient is not currently smoking. Recommend maintaining a smoke-free lifestyle. More than half the time of this 15 minute visit was spent counseling the patient. RTC 6 months w/ MD. Call or RTC sooner if symptoms persist or worsen acutely.       Carlo Rose

## 2019-09-24 ENCOUNTER — TELEPHONE (OUTPATIENT)
Dept: ORTHOPEDIC SURGERY | Age: 69
End: 2019-09-24

## 2019-10-02 ENCOUNTER — TELEPHONE (OUTPATIENT)
Dept: CASE MANAGEMENT | Age: 69
End: 2019-10-02

## 2019-10-07 RX ORDER — LISINOPRIL 40 MG/1
TABLET ORAL
Qty: 90 TABLET | Refills: 2 | Status: SHIPPED | OUTPATIENT
Start: 2019-10-07 | End: 2020-08-31

## 2019-10-09 RX ORDER — FUROSEMIDE 20 MG/1
20 TABLET ORAL DAILY
Qty: 90 TABLET | Refills: 0 | Status: SHIPPED | OUTPATIENT
Start: 2019-10-09 | End: 2019-12-31

## 2019-10-10 ENCOUNTER — OFFICE VISIT (OUTPATIENT)
Dept: CARDIOLOGY CLINIC | Age: 69
End: 2019-10-10
Payer: COMMERCIAL

## 2019-10-10 VITALS
OXYGEN SATURATION: 99 % | RESPIRATION RATE: 16 BRPM | SYSTOLIC BLOOD PRESSURE: 128 MMHG | DIASTOLIC BLOOD PRESSURE: 70 MMHG | WEIGHT: 132.5 LBS | BODY MASS INDEX: 20.08 KG/M2 | HEART RATE: 66 BPM | HEIGHT: 68 IN

## 2019-10-10 DIAGNOSIS — I10 ESSENTIAL HYPERTENSION: ICD-10-CM

## 2019-10-10 DIAGNOSIS — I50.42 CHRONIC COMBINED SYSTOLIC AND DIASTOLIC CONGESTIVE HEART FAILURE (HCC): Primary | ICD-10-CM

## 2019-10-10 DIAGNOSIS — I42.0 DILATED CARDIOMYOPATHY (HCC): ICD-10-CM

## 2019-10-10 PROBLEM — R93.1 ABNORMAL ECHOCARDIOGRAM: Status: RESOLVED | Noted: 2018-08-07 | Resolved: 2019-10-10

## 2019-10-10 PROCEDURE — 99213 OFFICE O/P EST LOW 20 MIN: CPT | Performed by: NURSE PRACTITIONER

## 2019-10-10 ASSESSMENT — ENCOUNTER SYMPTOMS
GASTROINTESTINAL NEGATIVE: 1
RESPIRATORY NEGATIVE: 1

## 2019-10-17 PROBLEM — Z00.00 WELL ADULT EXAM: Status: RESOLVED | Noted: 2017-05-18 | Resolved: 2019-10-17

## 2019-11-12 RX ORDER — CARVEDILOL 12.5 MG/1
12.5 TABLET ORAL 2 TIMES DAILY WITH MEALS
Qty: 180 TABLET | Refills: 3 | Status: SHIPPED | OUTPATIENT
Start: 2019-11-12 | End: 2020-05-14

## 2019-11-22 ENCOUNTER — TELEPHONE (OUTPATIENT)
Dept: CARDIOLOGY CLINIC | Age: 69
End: 2019-11-22

## 2019-11-27 ENCOUNTER — HOSPITAL ENCOUNTER (OUTPATIENT)
Age: 69
Discharge: HOME | End: 2019-11-27
Payer: COMMERCIAL

## 2019-11-27 DIAGNOSIS — E11.9 TYPE 2 DIABETES MELLITUS WITHOUT COMPLICATION, WITHOUT LONG-TERM CURRENT USE OF INSULIN (HCC): ICD-10-CM

## 2019-11-27 DIAGNOSIS — J44.9 COPD, MODERATE (HCC): ICD-10-CM

## 2019-11-27 DIAGNOSIS — C34.2 PRIMARY CANCER OF RIGHT MIDDLE LOBE OF LUNG (HCC): ICD-10-CM

## 2019-11-27 DIAGNOSIS — J30.2 SEASONAL ALLERGIC RHINITIS, UNSPECIFIED TRIGGER: Chronic | ICD-10-CM

## 2019-11-27 DIAGNOSIS — M1A.0490 IDIOPATHIC CHRONIC GOUT OF HAND WITHOUT TOPHUS, UNSPECIFIED LATERALITY: ICD-10-CM

## 2019-11-27 DIAGNOSIS — C34.01 MALIGNANT NEOPLASM OF RIGHT MAIN BRONCHUS (HCC): ICD-10-CM

## 2019-11-27 DIAGNOSIS — I10 ESSENTIAL HYPERTENSION: ICD-10-CM

## 2019-11-27 DIAGNOSIS — E78.2 MIXED HYPERLIPIDEMIA: ICD-10-CM

## 2019-11-27 DIAGNOSIS — E55.9 VITAMIN D DEFICIENCY: ICD-10-CM

## 2019-11-27 DIAGNOSIS — I50.42 CHRONIC COMBINED SYSTOLIC AND DIASTOLIC CONGESTIVE HEART FAILURE (HCC): ICD-10-CM

## 2019-11-27 DIAGNOSIS — G25.81 RLS (RESTLESS LEGS SYNDROME): ICD-10-CM

## 2019-11-27 DIAGNOSIS — K21.9 GASTROESOPHAGEAL REFLUX DISEASE WITHOUT ESOPHAGITIS: ICD-10-CM

## 2019-11-27 LAB
A/G RATIO: 1.2 (ref 1.1–2.2)
ALBUMIN SERPL-MCNC: 4.7 G/DL (ref 3.4–5)
ALP BLD-CCNC: 190 U/L (ref 40–129)
ALT SERPL-CCNC: 34 U/L (ref 10–40)
ANION GAP SERPL CALCULATED.3IONS-SCNC: 16 MMOL/L (ref 3–16)
AST SERPL-CCNC: 32 U/L (ref 15–37)
BASOPHILS ABSOLUTE: 0.1 K/UL (ref 0–0.2)
BASOPHILS RELATIVE PERCENT: 1.2 %
BILIRUB SERPL-MCNC: 0.3 MG/DL (ref 0–1)
BUN BLDV-MCNC: 12 MG/DL (ref 7–20)
CALCIUM SERPL-MCNC: 9.9 MG/DL (ref 8.3–10.6)
CHLORIDE BLD-SCNC: 99 MMOL/L (ref 99–110)
CO2: 26 MMOL/L (ref 21–32)
CREAT SERPL-MCNC: 1 MG/DL (ref 0.8–1.3)
EOSINOPHILS ABSOLUTE: 0.2 K/UL (ref 0–0.6)
EOSINOPHILS RELATIVE PERCENT: 2.1 %
ESTIMATED AVERAGE GLUCOSE: 197.3 MG/DL
GFR AFRICAN AMERICAN: >60
GFR NON-AFRICAN AMERICAN: >60
GLOBULIN: 4 G/DL
GLUCOSE BLD-MCNC: 138 MG/DL (ref 70–99)
HBA1C MFR BLD: 8.5 %
HCT VFR BLD CALC: 36.7 % (ref 40.5–52.5)
HEMOGLOBIN: 12.4 G/DL (ref 13.5–17.5)
LYMPHOCYTES ABSOLUTE: 1.5 K/UL (ref 1–5.1)
LYMPHOCYTES RELATIVE PERCENT: 18 %
MCH RBC QN AUTO: 29.9 PG (ref 26–34)
MCHC RBC AUTO-ENTMCNC: 33.7 G/DL (ref 31–36)
MCV RBC AUTO: 88.6 FL (ref 80–100)
MONOCYTES ABSOLUTE: 0.6 K/UL (ref 0–1.3)
MONOCYTES RELATIVE PERCENT: 7.5 %
NEUTROPHILS ABSOLUTE: 5.9 K/UL (ref 1.7–7.7)
NEUTROPHILS RELATIVE PERCENT: 71.2 %
PDW BLD-RTO: 14.1 % (ref 12.4–15.4)
PLATELET # BLD: 254 K/UL (ref 135–450)
PMV BLD AUTO: 8.7 FL (ref 5–10.5)
POTASSIUM SERPL-SCNC: 4.7 MMOL/L (ref 3.5–5.1)
RBC # BLD: 4.14 M/UL (ref 4.2–5.9)
SODIUM BLD-SCNC: 141 MMOL/L (ref 136–145)
TOTAL PROTEIN: 8.7 G/DL (ref 6.4–8.2)
WBC # BLD: 8.2 K/UL (ref 4–11)

## 2019-11-27 PROCEDURE — 80053 COMPREHEN METABOLIC PANEL: CPT

## 2019-11-27 PROCEDURE — 36415 COLL VENOUS BLD VENIPUNCTURE: CPT

## 2019-11-27 PROCEDURE — 83036 HEMOGLOBIN GLYCOSYLATED A1C: CPT

## 2019-11-27 PROCEDURE — 85025 COMPLETE CBC W/AUTO DIFF WBC: CPT

## 2019-12-31 RX ORDER — FUROSEMIDE 20 MG/1
TABLET ORAL
Qty: 90 TABLET | Refills: 0 | Status: SHIPPED | OUTPATIENT
Start: 2019-12-31 | End: 2020-03-27

## 2020-01-06 ENCOUNTER — OFFICE VISIT (OUTPATIENT)
Dept: INTERNAL MEDICINE CLINIC | Age: 70
End: 2020-01-06
Payer: COMMERCIAL

## 2020-01-06 VITALS
BODY MASS INDEX: 19.97 KG/M2 | HEIGHT: 68 IN | RESPIRATION RATE: 14 BRPM | WEIGHT: 131.8 LBS | DIASTOLIC BLOOD PRESSURE: 80 MMHG | HEART RATE: 66 BPM | SYSTOLIC BLOOD PRESSURE: 132 MMHG

## 2020-01-06 PROBLEM — C67.8 MALIGNANT NEOPLASM OF OVERLAPPING SITES OF BLADDER (HCC): Status: ACTIVE | Noted: 2020-01-06

## 2020-01-06 PROBLEM — C67.2 MALIGNANT NEOPLASM OF LATERAL WALL OF URINARY BLADDER (HCC): Status: ACTIVE | Noted: 2020-01-06

## 2020-01-06 PROBLEM — R19.5 CHANGE IN CONSISTENCY OF STOOL: Status: ACTIVE | Noted: 2020-01-06

## 2020-01-06 PROCEDURE — 99214 OFFICE O/P EST MOD 30 MIN: CPT | Performed by: INTERNAL MEDICINE

## 2020-01-06 ASSESSMENT — ENCOUNTER SYMPTOMS
RESPIRATORY NEGATIVE: 1
EYES NEGATIVE: 1
GASTROINTESTINAL NEGATIVE: 1
ALLERGIC/IMMUNOLOGIC NEGATIVE: 1
RHINORRHEA: 1

## 2020-01-06 ASSESSMENT — PATIENT HEALTH QUESTIONNAIRE - PHQ9
SUM OF ALL RESPONSES TO PHQ QUESTIONS 1-9: 0
2. FEELING DOWN, DEPRESSED OR HOPELESS: 0
SUM OF ALL RESPONSES TO PHQ QUESTIONS 1-9: 0
1. LITTLE INTEREST OR PLEASURE IN DOING THINGS: 0
SUM OF ALL RESPONSES TO PHQ9 QUESTIONS 1 & 2: 0

## 2020-01-06 NOTE — PROGRESS NOTES
artery disease include diabetes mellitus, dyslipidemia and hypertension. Current diabetic treatment includes diet, insulin injections and oral agent (monotherapy). He is compliant with treatment all of the time. His weight is stable. He is following a diabetic, low fat/cholesterol and low salt diet. Meal planning includes avoidance of concentrated sweets. He participates in exercise three times a week. His home blood glucose trend is fluctuating minimally. An ACE inhibitor/angiotensin II receptor blocker is being taken. Eye exam is current. No Known Allergies    Current Outpatient Medications   Medication Sig Dispense Refill    metFORMIN (GLUCOPHAGE) 500 MG tablet Take 2 tablets by mouth 2 times daily (with meals) 360 tablet 2    furosemide (LASIX) 20 MG tablet TAKE 1 TABLET BY MOUTH EVERY DAY 90 tablet 0    insulin glargine (LANTUS) 100 UNIT/ML injection pen INJECT 20 UNITS INTO THE SKIN NIGHTLY INDICATIONS: 18-20 UNITS AT AT BEDTIME 18 pen 3    carvedilol (COREG) 12.5 MG tablet Take 1 tablet by mouth 2 times daily (with meals) 180 tablet 3    lisinopril (PRINIVIL;ZESTRIL) 40 MG tablet TAKE 1 TABLET BY MOUTH EVERY DAY 90 tablet 2    rosuvastatin (CRESTOR) 40 MG tablet Take 1 tablet by mouth every evening 90 tablet 1    albuterol sulfate HFA (PROAIR HFA) 108 (90 Base) MCG/ACT inhaler Inhale 2 puffs into the lungs every 6 hours as needed for Wheezing 1 Inhaler 3    omeprazole (PRILOSEC) 20 MG delayed release capsule Take 20 mg by mouth as needed       amLODIPine (NORVASC) 10 MG tablet TAKE 1 TABLET BY MOUTH EVERY DAY 90 tablet 0    aspirin 81 MG tablet Take 81 mg by mouth daily       No current facility-administered medications for this visit.         Past Medical History:   Diagnosis Date    Allergic rhinitis, cause unspecified 7/15/2010    Cancer ([facility] [address]     lung    Colon polyps     Diabetes mellitus ([facility] [address]     Essential hypertension, benign 7/15/2010    HYPERCHOLESTERAEMIA     Hypertension     Lifestyle    Physical activity:     Days per week: Not on file     Minutes per session: Not on file    Stress: Not on file   Relationships    Social connections:     Talks on phone: Not on file     Gets together: Not on file     Attends [employer] service: Not on file     Active member of club or organization: Not on file     Attends meetings of clubs or organizations: Not on file     Relationship status: Not on file    Intimate partner violence:     Fear of current or ex partner: Not on file     Emotionally abused: Not on file     Physically abused: Not on file     Forced sexual activity: Not on file   Other Topics Concern    Not on file   Social History Narrative    Not on file       Review of Systems  Review of Systems   HENT: Positive for congestion, postnasal drip and rhinorrhea. Eyes: Negative. Reading glasses   Respiratory: Negative. S/P CA of Lung as noted cont F/U with Dr Melody Jones etc    Cardiovascular: Negative. Negative for chest pain and palpitations. Gastrointestinal: Negative. Needs repeat colonoscopy for polyp  Was to be done 2017 but not done 2nd to lung CA send for repeat now as per Dr Harris   S/P DAYRON inguinal hernia 2019  Now with greasy stool as noted    Endocrine: Negative. Negative for polyphagia. Genitourinary: Positive for frequency. Nocturia  Needs PSA in future- had recent Cysto for prostate nodule Bx was negative done at [facility] -s/p cysto 11/19 for bladder Ca as noted now resolved    Musculoskeletal: Negative. Back pain: improved         Occ RLS symptoms    Skin: Negative. Allergic/Immunologic: Negative. Neurological: Negative. Negative for numbness and headaches. Hematological: Negative. Psychiatric/Behavioral: Negative. Objective:   Physical Exam:  Physical Exam  Constitutional:       Appearance: He is well-developed. HENT:      Head: Normocephalic and atraumatic.       Right Ear: External ear normal.      Left Ear: current meds and F/U     Change in consistency of stool  Is to see Dr Juan Luis Lewis for colonoscopy recent greasy stool

## 2020-03-09 RX ORDER — ALBUTEROL SULFATE 90 UG/1
2 AEROSOL, METERED RESPIRATORY (INHALATION) EVERY 6 HOURS PRN
Qty: 1 INHALER | Refills: 3 | Status: SHIPPED | OUTPATIENT
Start: 2020-03-09 | End: 2020-03-11 | Stop reason: SDUPTHER

## 2020-03-11 RX ORDER — ALBUTEROL SULFATE 90 UG/1
2 AEROSOL, METERED RESPIRATORY (INHALATION) EVERY 6 HOURS PRN
Qty: 1 INHALER | Refills: 3 | Status: SHIPPED | OUTPATIENT
Start: 2020-03-11 | End: 2020-05-14 | Stop reason: ALTCHOICE

## 2020-03-18 ENCOUNTER — HOSPITAL ENCOUNTER (OUTPATIENT)
Dept: CT IMAGING | Age: 70
Discharge: HOME | End: 2020-03-18
Payer: COMMERCIAL

## 2020-03-18 PROCEDURE — 71250 CT THORAX DX C-: CPT

## 2020-03-26 ENCOUNTER — TELEPHONE (OUTPATIENT)
Dept: PULMONOLOGY | Age: 70
End: 2020-03-26

## 2020-03-26 NOTE — TELEPHONE ENCOUNTER
Please advise patient called and states he had a CT scan done and would like the results   Please call patient   [phone]

## 2020-03-26 NOTE — TELEPHONE ENCOUNTER
Can someone tell Mr. Polanco that his CT was again stable. We will resume follow up when the pandemic is over.

## 2020-03-27 RX ORDER — FUROSEMIDE 20 MG/1
TABLET ORAL
Qty: 90 TABLET | Refills: 0 | Status: SHIPPED | OUTPATIENT
Start: 2020-03-27 | End: 2020-06-23

## 2020-04-24 ENCOUNTER — TELEPHONE (OUTPATIENT)
Dept: OTHER | Facility: CLINIC | Age: 70
End: 2020-04-24

## 2020-05-13 ASSESSMENT — ENCOUNTER SYMPTOMS
RESPIRATORY NEGATIVE: 1
GASTROINTESTINAL NEGATIVE: 1

## 2020-05-13 NOTE — PROGRESS NOTES
(ferritin <100 ng/ml or 100-300 ng/ml if transferrin saturation <20%), to improve functional status and QoL. Assessment/Plan:    Encounter Diagnoses   Name     Dilated cardiomyopathy ([address] On GDT. Repeat echo at next [address] hypertension Elevated, increase coreg    Chronic combined systolic and diastolic congestive heart failure (HCC) Compensated, labs due    Tachycardia Increase coreg         Instructions:   1. Medications: increase coreg to 25mg twice a day, call if your blood pressure starts to run low (<100 top number)  2. Lifestyle Recommendations: Weigh yourself every day in the morning after urination, call Frankeliu Hogan if wt increases 2-3lb in one day or 5lb in one week, Limit sodium to 2000mg/day and fluids to 2L or 64oz/day. Add a fish oil supplement if you are not already taking one. 3. Follow up: 6 months and echo        [place]: [phone]      I appreciate the opportunity of cooperating in the care of this individual.    Angie Piña CNP, 5/13/2020,8:46 AM    QUALITY MEASURES  1. Tobacco Cessation Counseling: NA  2. Retake of BP if >140/90:   NA  3. Documentation to PCP/referring for new patient:  Sent to PCP at close of office visit  4. CAD patient on anti-platelet: NA  5. CAD patient on STATIN therapy:  NA  6. Patient with CHF and aFib on anticoagulation:  NA   7. Patient Education:NA   8. BB for LVSD :  Yes   9. ACE/ARB for LVSD:  Yes   10.  Left Ventricular Ejection Fraction (LVEF) Assessment:  Yes

## 2020-05-14 ENCOUNTER — OFFICE VISIT (OUTPATIENT)
Dept: CARDIOLOGY CLINIC | Age: 70
End: 2020-05-14
Payer: COMMERCIAL

## 2020-05-14 VITALS
OXYGEN SATURATION: 96 % | WEIGHT: 133.1 LBS | HEIGHT: 68 IN | BODY MASS INDEX: 20.17 KG/M2 | HEART RATE: 100 BPM | DIASTOLIC BLOOD PRESSURE: 90 MMHG | SYSTOLIC BLOOD PRESSURE: 160 MMHG

## 2020-05-14 PROBLEM — R00.0 TACHYCARDIA: Status: ACTIVE | Noted: 2020-05-14

## 2020-05-14 PROCEDURE — 99214 OFFICE O/P EST MOD 30 MIN: CPT | Performed by: NURSE PRACTITIONER

## 2020-05-14 PROCEDURE — 93000 ELECTROCARDIOGRAM COMPLETE: CPT | Performed by: NURSE PRACTITIONER

## 2020-05-14 RX ORDER — CARVEDILOL 25 MG/1
25 TABLET ORAL 2 TIMES DAILY WITH MEALS
Qty: 60 TABLET | Refills: 3 | Status: SHIPPED | OUTPATIENT
Start: 2020-05-14 | End: 2020-09-01

## 2020-05-21 ENCOUNTER — HOSPITAL ENCOUNTER (OUTPATIENT)
Age: 70
Discharge: HOME | End: 2020-05-21
Payer: COMMERCIAL

## 2020-05-21 LAB
A/G RATIO: 1.2 (ref 1.1–2.2)
ALBUMIN SERPL-MCNC: 4.5 G/DL (ref 3.4–5)
ALP BLD-CCNC: 169 U/L (ref 40–129)
ALT SERPL-CCNC: 57 U/L (ref 10–40)
ANION GAP SERPL CALCULATED.3IONS-SCNC: 10 MMOL/L (ref 3–16)
AST SERPL-CCNC: 54 U/L (ref 15–37)
BASOPHILS ABSOLUTE: 0.1 K/UL (ref 0–0.2)
BASOPHILS RELATIVE PERCENT: 1.3 %
BILIRUB SERPL-MCNC: 0.3 MG/DL (ref 0–1)
BUN BLDV-MCNC: 12 MG/DL (ref 7–20)
CALCIUM SERPL-MCNC: 9.7 MG/DL (ref 8.3–10.6)
CHLORIDE BLD-SCNC: 101 MMOL/L (ref 99–110)
CHOLESTEROL, TOTAL: 127 MG/DL (ref 0–199)
CO2: 29 MMOL/L (ref 21–32)
CREAT SERPL-MCNC: 0.9 MG/DL (ref 0.8–1.3)
EOSINOPHILS ABSOLUTE: 0.1 K/UL (ref 0–0.6)
EOSINOPHILS RELATIVE PERCENT: 2 %
GFR AFRICAN AMERICAN: >60
GFR NON-AFRICAN AMERICAN: >60
GLOBULIN: 3.7 G/DL
GLUCOSE BLD-MCNC: 132 MG/DL (ref 70–99)
HCT VFR BLD CALC: 38.5 % (ref 40.5–52.5)
HDLC SERPL-MCNC: 42 MG/DL (ref 40–60)
HEMOGLOBIN: 12.9 G/DL (ref 13.5–17.5)
LDL CHOLESTEROL CALCULATED: 63 MG/DL
LYMPHOCYTES ABSOLUTE: 1.2 K/UL (ref 1–5.1)
LYMPHOCYTES RELATIVE PERCENT: 16.8 %
MCH RBC QN AUTO: 29.9 PG (ref 26–34)
MCHC RBC AUTO-ENTMCNC: 33.6 G/DL (ref 31–36)
MCV RBC AUTO: 88.9 FL (ref 80–100)
MONOCYTES ABSOLUTE: 0.6 K/UL (ref 0–1.3)
MONOCYTES RELATIVE PERCENT: 7.8 %
NEUTROPHILS ABSOLUTE: 5.3 K/UL (ref 1.7–7.7)
NEUTROPHILS RELATIVE PERCENT: 72.1 %
PDW BLD-RTO: 13.3 % (ref 12.4–15.4)
PLATELET # BLD: 198 K/UL (ref 135–450)
PMV BLD AUTO: 8.7 FL (ref 5–10.5)
POTASSIUM SERPL-SCNC: 5.3 MMOL/L (ref 3.5–5.1)
PRO-BNP: 149 PG/ML (ref 0–124)
RBC # BLD: 4.33 M/UL (ref 4.2–5.9)
SODIUM BLD-SCNC: 140 MMOL/L (ref 136–145)
TOTAL PROTEIN: 8.2 G/DL (ref 6.4–8.2)
TRIGL SERPL-MCNC: 109 MG/DL (ref 0–150)
TSH REFLEX: 1.56 UIU/ML (ref 0.27–4.2)
VLDLC SERPL CALC-MCNC: 22 MG/DL
WBC # BLD: 7.3 K/UL (ref 4–11)

## 2020-05-21 PROCEDURE — 85025 COMPLETE CBC W/AUTO DIFF WBC: CPT

## 2020-05-21 PROCEDURE — 36415 COLL VENOUS BLD VENIPUNCTURE: CPT

## 2020-05-21 PROCEDURE — 83880 ASSAY OF NATRIURETIC PEPTIDE: CPT

## 2020-05-21 PROCEDURE — 80061 LIPID PANEL: CPT

## 2020-05-21 PROCEDURE — 83036 HEMOGLOBIN GLYCOSYLATED A1C: CPT

## 2020-05-21 PROCEDURE — 80053 COMPREHEN METABOLIC PANEL: CPT

## 2020-05-21 PROCEDURE — 84443 ASSAY THYROID STIM HORMONE: CPT

## 2020-05-22 ENCOUNTER — TELEPHONE (OUTPATIENT)
Dept: CARDIOLOGY CLINIC | Age: 70
End: 2020-05-22

## 2020-05-22 LAB
ESTIMATED AVERAGE GLUCOSE: 194.4 MG/DL
HBA1C MFR BLD: 8.4 %

## 2020-06-09 ENCOUNTER — OFFICE VISIT (OUTPATIENT)
Dept: INTERNAL MEDICINE CLINIC | Age: 70
End: 2020-06-09
Payer: COMMERCIAL

## 2020-06-09 VITALS
BODY MASS INDEX: 20.1 KG/M2 | TEMPERATURE: 97.3 F | HEIGHT: 68 IN | RESPIRATION RATE: 14 BRPM | SYSTOLIC BLOOD PRESSURE: 130 MMHG | DIASTOLIC BLOOD PRESSURE: 80 MMHG | WEIGHT: 132.6 LBS | HEART RATE: 66 BPM

## 2020-06-09 PROCEDURE — 3052F HG A1C>EQUAL 8.0%<EQUAL 9.0%: CPT | Performed by: INTERNAL MEDICINE

## 2020-06-09 PROCEDURE — 99214 OFFICE O/P EST MOD 30 MIN: CPT | Performed by: INTERNAL MEDICINE

## 2020-06-09 ASSESSMENT — ENCOUNTER SYMPTOMS
RESPIRATORY NEGATIVE: 1
GASTROINTESTINAL NEGATIVE: 1
COUGH: 0
EYES NEGATIVE: 1
ALLERGIC/IMMUNOLOGIC NEGATIVE: 1
SHORTNESS OF BREATH: 0
RHINORRHEA: 1
WHEEZING: 0

## 2020-06-09 NOTE — ASSESSMENT & PLAN NOTE
Still slightly out of control GI c/o when metformin was increase will cont 500 BID and Lantis as noted will add Elba Graham if he can afford it given script and # of assistance program

## 2020-06-10 ENCOUNTER — OFFICE VISIT (OUTPATIENT)
Dept: PULMONOLOGY | Age: 70
End: 2020-06-10
Payer: COMMERCIAL

## 2020-06-10 ENCOUNTER — TELEPHONE (OUTPATIENT)
Dept: PULMONOLOGY | Age: 70
End: 2020-06-10

## 2020-06-10 VITALS — TEMPERATURE: 97.8 F

## 2020-06-10 PROCEDURE — 99213 OFFICE O/P EST LOW 20 MIN: CPT | Performed by: INTERNAL MEDICINE

## 2020-06-10 NOTE — PROGRESS NOTES
scraped the chest tube is coming out. He does not want to have a third one placed.      Past Medical History:    Past Medical History:   Diagnosis Date    Allergic rhinitis, cause unspecified 7/15/2010    Cancer ([facility] [address]     lung    Colon polyps     Diabetes mellitus ([facility]     Essential hypertension, benign 7/15/2010    HYPERCHOLESTERAEMIA     Hypertension     Lipoma of other specified sites 7/15/2010    Other abnormal blood chemistry 7/15/2010    Other and unspecified hyperlipidemia 7/15/2010    Other symptoms involving cardiovascular system 7/15/2010    Psychosexual dysfunction with inhibited sexual excitement 7/15/2010       Social History:    Social History     Tobacco Use   Smoking Status Former Smoker    Packs/day: 0.50    Years: 40.00    Pack years: 20.00    Types: Cigarettes    Start date: 7/29/1965    Last attempt to quit: 4/23/2017    Years since quitting: 3.1   Smokeless Tobacco Never Used   Tobacco Comment    To try gum or patch to start quiting classes        Current Medications:  Current Outpatient Medications on File Prior to Visit   Medication Sig Dispense Refill    metFORMIN (GLUCOPHAGE) 500 MG tablet 500 mg 2 tablets daily as needed      dapagliflozin (FARXIGA) 5 MG tablet Take 1 tablet by mouth every morning 90 tablet 1    carvedilol (COREG) 25 MG tablet Take 1 tablet by mouth 2 times daily (with meals) 60 tablet 3    furosemide (LASIX) 20 MG tablet TAKE 1 TABLET BY MOUTH EVERY DAY 90 tablet 0    insulin glargine (LANTUS) 100 UNIT/ML injection pen INJECT 20 UNITS INTO THE SKIN NIGHTLY INDICATIONS: 18-20 UNITS AT AT BEDTIME 18 pen 3    lisinopril (PRINIVIL;ZESTRIL) 40 MG tablet TAKE 1 TABLET BY MOUTH EVERY DAY 90 tablet 2    rosuvastatin (CRESTOR) 40 MG tablet Take 1 tablet by mouth every evening 90 tablet 1    omeprazole (PRILOSEC) 20 MG delayed release capsule Take 20 mg by mouth as needed       amLODIPine (NORVASC) 10 MG tablet TAKE 1 TABLET BY MOUTH EVERY DAY 90 tablet pneumothorax. Consolidated lung in the right upper chest is consistent with   atelectasis. Trachea is midline.       Impression:       Chest tube placement with reexpansion of the right lung         3/18  No change since 1/24/2018, with extensive atelectasis in the right   lung and soft tissue thickening in the right hilar region. 10/18  Impression       1.  Posttherapeutic changes reidentified in the right upper lobe including confluent airspace disease posteriorly into the superior segment of the lower lobe, similar to the previous study. There are slightly increased groundglass and reticular opacities    anteriorly in the upper lobe, which are nonspecific but could represent ongoing fibrosis versus infection or inflammation. Attention on follow-up.       2.  The small amount of loculated right apical pleural air on the previous study has been replaced with a small amount of pleural fluid.       3.  Borderline sized right paraesophageal lymph node, increased from previous study. 3/2019  Impression       1. Posttherapy changes noted with consolidation and bronchiectasis right apex.         No evidence recurrent or residual neoplasm.       Subcentimeter pretracheal lymph node unchanged benign. No significant lymphadenopathy.       Coronary artery calcification which is a risk factor for acute coronary syndrome.       Mild compression fracture superior endplate T7 with adjacent bony sclerosis may relate to posttraumatic or osteoporotic compression fracture. Pathologic compression fracture is not excluded. Continued follow-up recommended. Findings present previously     9/2019  Impression       Consolidation/atelectasis and volume loss in the right thorax without change.       Chronic pancreatitis.       Stable small left pulmonary nodules. Last PFTs: CONCLUSION:  Moderate obstructive defect without bronchodilator  response. Air trapping present and moderate decrease in diffusion.    These findings

## 2020-06-23 RX ORDER — FUROSEMIDE 20 MG/1
TABLET ORAL
Qty: 90 TABLET | Refills: 1 | Status: SHIPPED | OUTPATIENT
Start: 2020-06-23 | End: 2020-12-29

## 2020-08-31 ENCOUNTER — HOSPITAL ENCOUNTER (OUTPATIENT)
Age: 70
Discharge: HOME | End: 2020-08-31
Payer: MEDICARE

## 2020-08-31 ENCOUNTER — HOSPITAL ENCOUNTER (OUTPATIENT)
Dept: CT IMAGING | Age: 70
Discharge: HOME | End: 2020-08-31
Payer: MEDICARE

## 2020-08-31 LAB
A/G RATIO: 1.3 (ref 1.1–2.2)
ALBUMIN SERPL-MCNC: 4.6 G/DL (ref 3.4–5)
ALP BLD-CCNC: 140 U/L (ref 40–129)
ALT SERPL-CCNC: 28 U/L (ref 10–40)
ANION GAP SERPL CALCULATED.3IONS-SCNC: 14 MMOL/L (ref 3–16)
AST SERPL-CCNC: 30 U/L (ref 15–37)
BASOPHILS ABSOLUTE: 0.1 K/UL (ref 0–0.2)
BASOPHILS RELATIVE PERCENT: 1 %
BILIRUB SERPL-MCNC: 0.3 MG/DL (ref 0–1)
BUN BLDV-MCNC: 16 MG/DL (ref 7–20)
CALCIUM SERPL-MCNC: 9.7 MG/DL (ref 8.3–10.6)
CHLORIDE BLD-SCNC: 103 MMOL/L (ref 99–110)
CHOLESTEROL, TOTAL: 162 MG/DL (ref 0–199)
CO2: 25 MMOL/L (ref 21–32)
CREAT SERPL-MCNC: 1 MG/DL (ref 0.8–1.3)
EOSINOPHILS ABSOLUTE: 0.1 K/UL (ref 0–0.6)
EOSINOPHILS RELATIVE PERCENT: 1.8 %
ESTIMATED AVERAGE GLUCOSE: 182.9 MG/DL
GFR AFRICAN AMERICAN: >60
GFR NON-AFRICAN AMERICAN: >60
GLOBULIN: 3.6 G/DL
GLUCOSE BLD-MCNC: 80 MG/DL (ref 70–99)
HBA1C MFR BLD: 8 %
HCT VFR BLD CALC: 38.9 % (ref 40.5–52.5)
HDLC SERPL-MCNC: 40 MG/DL (ref 40–60)
HEMOGLOBIN: 12.9 G/DL (ref 13.5–17.5)
LDL CHOLESTEROL CALCULATED: 88 MG/DL
LYMPHOCYTES ABSOLUTE: 1.2 K/UL (ref 1–5.1)
LYMPHOCYTES RELATIVE PERCENT: 15.1 %
MCH RBC QN AUTO: 30.1 PG (ref 26–34)
MCHC RBC AUTO-ENTMCNC: 33.2 G/DL (ref 31–36)
MCV RBC AUTO: 90.6 FL (ref 80–100)
MONOCYTES ABSOLUTE: 0.5 K/UL (ref 0–1.3)
MONOCYTES RELATIVE PERCENT: 6.7 %
NEUTROPHILS ABSOLUTE: 6 K/UL (ref 1.7–7.7)
NEUTROPHILS RELATIVE PERCENT: 75.4 %
PDW BLD-RTO: 13.6 % (ref 12.4–15.4)
PLATELET # BLD: 212 K/UL (ref 135–450)
PMV BLD AUTO: 8.5 FL (ref 5–10.5)
POTASSIUM SERPL-SCNC: 3.8 MMOL/L (ref 3.5–5.1)
RBC # BLD: 4.29 M/UL (ref 4.2–5.9)
SODIUM BLD-SCNC: 142 MMOL/L (ref 136–145)
TOTAL PROTEIN: 8.2 G/DL (ref 6.4–8.2)
TRIGL SERPL-MCNC: 171 MG/DL (ref 0–150)
TSH REFLEX: 2.61 UIU/ML (ref 0.27–4.2)
VLDLC SERPL CALC-MCNC: 34 MG/DL
WBC # BLD: 8 K/UL (ref 4–11)

## 2020-08-31 PROCEDURE — 80053 COMPREHEN METABOLIC PANEL: CPT

## 2020-08-31 PROCEDURE — 83036 HEMOGLOBIN GLYCOSYLATED A1C: CPT

## 2020-08-31 PROCEDURE — 36415 COLL VENOUS BLD VENIPUNCTURE: CPT

## 2020-08-31 PROCEDURE — 80061 LIPID PANEL: CPT

## 2020-08-31 PROCEDURE — 71250 CT THORAX DX C-: CPT

## 2020-08-31 PROCEDURE — 85025 COMPLETE CBC W/AUTO DIFF WBC: CPT

## 2020-08-31 PROCEDURE — 84443 ASSAY THYROID STIM HORMONE: CPT

## 2020-08-31 RX ORDER — LISINOPRIL 40 MG/1
TABLET ORAL
Qty: 90 TABLET | Refills: 2 | Status: SHIPPED | OUTPATIENT
Start: 2020-08-31 | End: 2021-05-25

## 2020-09-01 RX ORDER — CARVEDILOL 25 MG/1
TABLET ORAL
Qty: 180 TABLET | Refills: 1 | Status: SHIPPED | OUTPATIENT
Start: 2020-09-01 | End: 2021-03-15

## 2020-09-14 ENCOUNTER — OFFICE VISIT (OUTPATIENT)
Dept: INTERNAL MEDICINE CLINIC | Age: 70
End: 2020-09-14
Payer: MEDICARE

## 2020-09-14 VITALS
HEART RATE: 78 BPM | SYSTOLIC BLOOD PRESSURE: 120 MMHG | DIASTOLIC BLOOD PRESSURE: 78 MMHG | BODY MASS INDEX: 19.37 KG/M2 | TEMPERATURE: 97.5 F | OXYGEN SATURATION: 99 % | WEIGHT: 127.8 LBS | RESPIRATION RATE: 14 BRPM | HEIGHT: 68 IN

## 2020-09-14 PROBLEM — J90 PLEURAL EFFUSION, BILATERAL: Status: RESOLVED | Noted: 2018-12-18 | Resolved: 2020-09-14

## 2020-09-14 PROCEDURE — 90654 INFLUENZA, QUADV, ADJUVANTED, 65 YRS +, IM, PF, PREFILL SYR, 0.5ML (FLUAD): CPT | Performed by: INTERNAL MEDICINE

## 2020-09-14 PROCEDURE — G0008 ADMIN INFLUENZA VIRUS VAC: HCPCS | Performed by: INTERNAL MEDICINE

## 2020-09-14 PROCEDURE — 3052F HG A1C>EQUAL 8.0%<EQUAL 9.0%: CPT | Performed by: INTERNAL MEDICINE

## 2020-09-14 PROCEDURE — 99214 OFFICE O/P EST MOD 30 MIN: CPT | Performed by: INTERNAL MEDICINE

## 2020-09-14 ASSESSMENT — ENCOUNTER SYMPTOMS
WHEEZING: 0
RESPIRATORY NEGATIVE: 1
SHORTNESS OF BREATH: 0
RHINORRHEA: 1
GASTROINTESTINAL NEGATIVE: 1
ALLERGIC/IMMUNOLOGIC NEGATIVE: 1
EYES NEGATIVE: 1
COUGH: 0

## 2020-09-14 NOTE — ASSESSMENT & PLAN NOTE
Slightly improved control cont diet  Does not tolerate metfomin GI c/o no on Farxiga and Lantis to cont to adjust meds etc

## 2020-09-14 NOTE — PROGRESS NOTES
Subjective:      Patient ID: Mayra Almeida is a 68 y.o. male. HPI  Here today for follow up of chronic problems as per HPI and as problems listed under assessment and plan,no new c/o feels good     Diabetes   He presents for his follow-up diabetic visit. He has type 2 diabetes mellitus. His disease course has been fluctuating. There are no hypoglycemic associated symptoms. Pertinent negatives for hypoglycemia include no headaches. Pertinent negatives for diabetes include no chest pain, no foot paresthesias and no polyphagia. Symptoms are stable. Diabetic complications include heart disease. Risk factors for coronary artery disease include diabetes mellitus, dyslipidemia and hypertension. Current diabetic treatment includes diet, insulin injections and oral agent (monotherapy). He is compliant with treatment all of the time. His weight is stable. He is following a diabetic, low fat/cholesterol and low salt diet. Meal planning includes avoidance of concentrated sweets. He participates in exercise three times a week. His home blood glucose trend is fluctuating minimally. An ACE inhibitor/angiotensin II receptor blocker is being taken. Eye exam is current. Hypertension   This is a chronic problem. The current episode started more than 1 year ago. The problem has been waxing and waning since onset. The problem is controlled. Pertinent negatives include no chest pain, headaches, palpitations or shortness of breath. Risk factors for coronary artery disease include diabetes mellitus, male gender and dyslipidemia. Past treatments include ACE inhibitors, calcium channel blockers, diuretics, beta blockers and lifestyle changes. The current treatment provides significant improvement. There are no compliance problems. Hypertensive end-organ damage includes heart failure. Hyperlipidemia   This is a chronic problem. The current episode started more than 1 year ago. The problem is controlled.  Recent lipid tests were reviewed and are low. Exacerbating diseases include diabetes. There are no known factors aggravating his hyperlipidemia. Pertinent negatives include no chest pain or shortness of breath. Current antihyperlipidemic treatment includes diet change, exercise and statins. The current treatment provides significant improvement of lipids. There are no compliance problems. Risk factors for coronary artery disease include dyslipidemia, diabetes mellitus and hypertension. Allergies   Allergen Reactions    Metformin And Related Diarrhea       Current Outpatient Medications   Medication Sig Dispense Refill    Chlorpheniramine-Phenylephrine (KARL PO) Take by mouth 2 times daily       carvedilol (COREG) 25 MG tablet TAKE 1 TABLET BY MOUTH TWICE A DAY WITH MEALS 180 tablet 1    lisinopril (PRINIVIL;ZESTRIL) 40 MG tablet TAKE 1 TABLET BY MOUTH EVERY DAY 90 tablet 2    furosemide (LASIX) 20 MG tablet TAKE 1 TABLET BY MOUTH EVERY DAY 90 tablet 1    dapagliflozin (FARXIGA) 5 MG tablet Take 1 tablet by mouth every morning 90 tablet 1    insulin glargine (LANTUS) 100 UNIT/ML injection pen INJECT 20 UNITS INTO THE SKIN NIGHTLY INDICATIONS: 18-20 UNITS AT AT BEDTIME 18 pen 3    rosuvastatin (CRESTOR) 40 MG tablet Take 1 tablet by mouth every evening 90 tablet 1    omeprazole (PRILOSEC) 20 MG delayed release capsule Take 20 mg by mouth as needed       amLODIPine (NORVASC) 10 MG tablet TAKE 1 TABLET BY MOUTH EVERY DAY 90 tablet 0    aspirin 81 MG tablet Take 81 mg by mouth daily       No current facility-administered medications for this visit.         Past Medical History:   Diagnosis Date    Allergic rhinitis, cause unspecified 7/15/2010    Cancer ([facility]     lung    Colon polyps     Diabetes mellitus ([facility]     Essential hypertension, benign 7/15/2010    HYPERCHOLESTERAEMIA     Hypertension     Lipoma of other specified sites 7/15/2010    Other abnormal blood chemistry 7/15/2010    Other and unspecified hyperlipidemia 7/15/2010    Other symptoms involving cardiovascular system 7/15/2010    Psychosexual dysfunction with inhibited sexual excitement 7/15/2010       Family History   Problem Relation Age of Onset    Breast Cancer Mother        Past Surgical History:   Procedure Laterality Date    BRONCHOSCOPY Right 04/27/2017    Dr. Kourtney Deshpande - w/R BI stent placement size 12x3    BRONCHOSCOPY  04/24/2017    FAVIO Aguilar - w/EBUS    BRONCHOSCOPY  09/12/2017    COLONOSCOPY  10/31/2007    polyps    COLONOSCOPY  2012    polyp- repeat 2017    CYSTOSCOPY N/A 8/14/2019    CYSTOSCOPY TRANSURETHRAL RESECTION BLADDER performed by Audelia Brown MD at [facility] Left 8/14/2019    OPEN LEFT INGUINAL HERNIA REPAIR WITH MESH performed by Anni Chappell MD at [address] [place] [address] Right [facility]       Social History     Socioeconomic History    Marital status:      Spouse name: Not on file    Number of children: Not on file    Years of education: Not on file    Highest education level: Not on file   Occupational History    Not on file   Social Needs    Financial resource strain: Not on file    Food insecurity     Worry: Not on file     Inability: Not on file    Transportation needs     Medical: Not on file     Non-medical: Not on file   Tobacco Use    Smoking status: Former Smoker     Packs/day: 0.50     Years: 40.00     Pack years: 20.00     Types: Cigarettes     Start date: 7/29/1965     Last attempt to quit: 4/23/2017     Years since quitting: 3.3    Smokeless tobacco: Never Used    Tobacco comment:  To try gum or patch to start quiting classes    Substance and Sexual Activity    Alcohol use: Yes     Frequency: Monthly or less     Comment: rare    Drug use: Not Currently     Types: Marijuana    Sexual activity: Yes     Partners: Female     Comment: on occasion   Lifestyle    Physical activity     Days per week: Not on file     Minutes per session: Not on file    Stress: Not on file   Relationships    Social connections     Talks on phone: Not on file     Gets together: Not on file     Attends Sikh service: Not on file     Active member of club or organization: Not on file     Attends meetings of clubs or organizations: Not on file     Relationship status: Not on file    Intimate partner violence     Fear of current or ex partner: Not on file     Emotionally abused: Not on file     Physically abused: Not on file     Forced sexual activity: Not on file   Other Topics Concern    Not on file   Social History Narrative    Not on file       Review of Systems  Review of Systems   Constitutional: Negative. HENT: Positive for congestion and rhinorrhea. Eyes: Negative. Reading glasses   Respiratory: Negative. Negative for cough, shortness of breath and wheezing. S/P CA of Lung as noted cont F/U with Dr Shah Space etc    Cardiovascular: Negative. Negative for chest pain and palpitations. Gastrointestinal: Negative. Needs repeat colonoscopy for polyp  Was to be done 2017 but not done 2nd to lung CA send for repeat now was normal 2020  as per Dr Junior Abebe   S/P L inguinal hernia 2019  Now with greasy stool as noted  Improved    Endocrine: Negative. Negative for polyphagia. Genitourinary: Positive for frequency. Nocturia  Needs PSA in future- had recent Cysto for prostate nodule Bx was negative done at [facility] -s/p cysto 11/19 for bladder Ca as noted now resolved    Musculoskeletal: Negative. Back pain: improved         Occ RLS symptoms    Skin: Negative. Allergic/Immunologic: Negative. Neurological: Negative. Negative for numbness and headaches. Hematological: Negative. Psychiatric/Behavioral: Negative. Objective:   Physical Exam:  Physical Exam  Constitutional:       Appearance: He is well-developed. HENT:      Head: Normocephalic and atraumatic.       Right Ear: External ear normal.      Left Ear: External ear normal. Eyes:      Conjunctiva/sclera: Conjunctivae normal.      Pupils: Pupils are equal, round, and reactive to light. Neck:      Musculoskeletal: Normal range of motion and neck supple. Thyroid: No thyromegaly. Trachea: No tracheal deviation. Cardiovascular:      Rate and Rhythm: Normal rate and regular rhythm. Pulses: Normal pulses. Heart sounds: Normal heart sounds. No murmur. Pulmonary:      Effort: Pulmonary effort is normal.      Breath sounds: Normal breath sounds. Abdominal:      General: Abdomen is flat. Bowel sounds are normal.      Palpations: Abdomen is soft. Genitourinary:     Comments: NA   Musculoskeletal: Normal range of motion. Right lower leg: No edema. Left lower leg: No edema. Skin:     General: Skin is warm and dry. Neurological:      General: No focal deficit present. Mental Status: He is alert and oriented to person, place, and time. Mental status is at baseline. Deep Tendon Reflexes: Reflexes are normal and symmetric. Psychiatric:         Mood and Affect: Mood normal.         Behavior: Behavior normal.         Thought Content: Thought content normal.         /78 (Site: Right Upper Arm, Position: Sitting, Cuff Size: Medium Adult)   Pulse 78   Temp 97.5 °F (36.4 °C) (Oral)   Resp 14   Ht 5' 8\" (1.727 m)   Wt 127 lb 12.8 oz (58 kg)   SpO2 99%   BMI 19.43 kg/m²       Assessment & Plan:         Allergic rhinitis  Prn Tx with Flonase,Claritin and Mucinex DM     COPD, moderate (HCC)  Remains stable no current meds  No increase in RADFORD or SOB     Chronic combined systolic and diastolic congestive heart failure (HCC)  Under good control with current meds     Essential hypertension  Stable continue current meds and return in 3 mo.         Gastroesophageal reflux disease without esophagitis  Well controled with diet and meds     Gout  No recent symptoms cont meds and diet     Lung cancer, main bronchus (HCC)  Remains stable cont current F/U etc     Malignant neoplasm of lateral wall of urinary bladder (HCC)  Stable cont F/U with      Mixed hyperlipidemia  Stable continue current meds and return in 3 mo.     cpe     Primary cancer of right middle lobe of lung ([address]  Remains stable     Type 2 diabetes mellitus without complication, with long-term current use of insulin (HCC)  Slightly improved control cont diet  Does not tolerate metfomin GI c/o no on Farxiga and [place] to cont to adjust meds etc

## 2020-09-14 NOTE — PROGRESS NOTES
Vaccine Information Sheet, \"Influenza - Inactivated\"  given to Naima Rapp, or parent/legal guardian of  Naima Rapp and verbalized understanding. Patient responses:    Have you ever had a reaction to a flu vaccine? No  Do you have any current illness? No  Have you ever had Guillian [place] Syndrome? No  Do you have a serious allergy to any of the follow: Neomycin, Polymyxin, Thimerosal, eggs or egg products? No    Flu vaccine given per order. Please see immunization tab. Risks and benefits explained. Current VIS given.       Immunizations Administered     Name Date Dose Route    Influenza, Quadv, adjuvanted, 65 yrs +, IM, PF (Fluad) 9/14/2020 0.5 mL Intramuscular    Site: Deltoid- Left    Lot: [id number]    NDC: 56741-463-06

## 2020-09-22 ENCOUNTER — TELEPHONE (OUTPATIENT)
Dept: INTERNAL MEDICINE | Age: 70
End: 2020-09-22

## 2020-09-23 NOTE — TELEPHONE ENCOUNTER
CLINICAL PHARMACY: STATIN REVIEW    SUBJECTIVE:   Identified as DM care gap for [place]: statin therapy. OBJECTIVE:  Allergies   Allergen Reactions    Metformin And Related Diarrhea       Medications per current medication list:  Current Outpatient Medications   Medication Sig Dispense Refill    Chlorpheniramine-Phenylephrine (KARL PO) Take by mouth 2 times daily       carvedilol (COREG) 25 MG tablet TAKE 1 TABLET BY MOUTH TWICE A DAY WITH MEALS 180 tablet 1    lisinopril (PRINIVIL;ZESTRIL) 40 MG tablet TAKE 1 TABLET BY MOUTH EVERY DAY 90 tablet 2    furosemide (LASIX) 20 MG tablet TAKE 1 TABLET BY MOUTH EVERY DAY 90 tablet 1    dapagliflozin (FARXIGA) 5 MG tablet Take 1 tablet by mouth every morning 90 tablet 1    insulin glargine (LANTUS) 100 UNIT/ML injection pen INJECT 20 UNITS INTO THE SKIN NIGHTLY INDICATIONS: 18-20 UNITS AT AT BEDTIME 18 pen 3    rosuvastatin (CRESTOR) 40 MG tablet Take 1 tablet by mouth every evening 90 tablet 1    omeprazole (PRILOSEC) 20 MG delayed release capsule Take 20 mg by mouth as needed       amLODIPine (NORVASC) 10 MG tablet TAKE 1 TABLET BY MOUTH EVERY DAY 90 tablet 0    aspirin 81 MG tablet Take 81 mg by mouth daily       No current facility-administered medications for this visit.         Labs:  Lab Results   Component Value Date    CHOL 162 08/31/2020    CHOL 127 05/21/2020    CHOL 123 09/11/2019     Lab Results   Component Value Date    TRIG 171 (H) 08/31/2020    TRIG 109 05/21/2020    TRIG 181 (H) 09/11/2019     Lab Results   Component Value Date    HDL 40 08/31/2020    HDL 42 05/21/2020    HDL 42 09/11/2019     Lab Results   Component Value Date    LDLCALC 88 08/31/2020    LDLCALC 63 05/21/2020    LDLCALC 45 09/11/2019     Lab Results   Component Value Date    LABVLDL 34 08/31/2020    LABVLDL 22 05/21/2020    LABVLDL 36 09/11/2019     Lab Results   Component Value Date    CHOLHDLRATIO 4.1 03/28/2017    CHOLHDLRATIO 3.5 10/25/2016    CHOLHDLRATIO 7.4 (H) 03/07/2016     Lab Results   Component Value Date    ALT 28 08/31/2020        The 10-year ASCVD risk score (Fabienne Maki, et al., 2013) is: 35%    Values used to calculate the score:      Age: 68 years      Sex: Male      Is Non- : No      Diabetic: Yes      Tobacco smoker: No      Systolic Blood Pressure: 807 mmHg      Is BP treated: Yes      HDL Cholesterol: 40 mg/dL      Total Cholesterol: 162 mg/dL      ASSESSMENT:    2019 ADA Guidelines Age:     >/= 36years old:   o History of ASCVD or 10-year ASCVD risk > 20% - high-intensity statin is recommended. o Rosuvastatin 40 mg in med list    PLAN:  Per [facility] last [facility] 3/2019 for 30 day, no active script on file. Dr Moises Hutton. Attempted to reach patient for review.  LM    Thank you,    Refugio Jerry, PharmD, [place] Pharmacist  Direct: [phone], Ext [phone]

## 2020-09-24 RX ORDER — ROSUVASTATIN CALCIUM 20 MG/1
20 TABLET, COATED ORAL DAILY
Qty: 90 TABLET | Refills: 1 | Status: SHIPPED | OUTPATIENT
Start: 2020-09-24 | End: 2021-03-24

## 2020-10-08 ENCOUNTER — TELEPHONE (OUTPATIENT)
Dept: PHARMACY | Facility: CLINIC | Age: 70
End: 2020-10-08

## 2020-10-08 NOTE — TELEPHONE ENCOUNTER
CLINICAL PHARMACY: STATIN REVIEW    SUBJECTIVE:   Identified as DM care gap for [place]: statin therapy. OBJECTIVE:  Allergies   Allergen Reactions    Metformin And Related Diarrhea       Medications per current medication list:  Current Outpatient Medications   Medication Sig Dispense Refill    rosuvastatin (CRESTOR) 20 MG tablet Take 1 tablet by mouth daily 90 tablet 1    carvedilol (COREG) 25 MG tablet TAKE 1 TABLET BY MOUTH TWICE A DAY WITH MEALS 180 tablet 1    lisinopril (PRINIVIL;ZESTRIL) 40 MG tablet TAKE 1 TABLET BY MOUTH EVERY DAY 90 tablet 2    furosemide (LASIX) 20 MG tablet TAKE 1 TABLET BY MOUTH EVERY DAY 90 tablet 1    dapagliflozin (FARXIGA) 5 MG tablet Take 1 tablet by mouth every morning 90 tablet 1    insulin glargine (LANTUS) 100 UNIT/ML injection pen INJECT 20 UNITS INTO THE SKIN NIGHTLY INDICATIONS: 18-20 UNITS AT AT BEDTIME 18 pen 3    omeprazole (PRILOSEC) 20 MG delayed release capsule Take 20 mg by mouth as needed       amLODIPine (NORVASC) 10 MG tablet TAKE 1 TABLET BY MOUTH EVERY DAY 90 tablet 0    aspirin 81 MG tablet Take 81 mg by mouth daily       No current facility-administered medications for this visit.         Labs:  Lab Results   Component Value Date    CHOL 162 08/31/2020    CHOL 127 05/21/2020    CHOL 123 09/11/2019     Lab Results   Component Value Date    TRIG 171 (H) 08/31/2020    TRIG 109 05/21/2020    TRIG 181 (H) 09/11/2019     Lab Results   Component Value Date    HDL 40 08/31/2020    HDL 42 05/21/2020    HDL 42 09/11/2019     Lab Results   Component Value Date    LDLCALC 88 08/31/2020    LDLCALC 63 05/21/2020    LDLCALC 45 09/11/2019     Lab Results   Component Value Date    LABVLDL 34 08/31/2020    LABVLDL 22 05/21/2020    LABVLDL 36 09/11/2019     Lab Results   Component Value Date    CHOLHDLRATIO 4.1 03/28/2017    CHOLHDLRATIO 3.5 10/25/2016    CHOLHDLRATIO 7.4 (H) 03/07/2016     Lab Results   Component Value Date    ALT 28 08/31/2020        The 10-year ASCVD risk score (Odalis Hobbs et al., 2013) is: 35%    Values used to calculate the score:      Age: 68 years      Sex: Male      Is Non- : No      Diabetic: Yes      Tobacco smoker: No      Systolic Blood Pressure: 872 mmHg      Is BP treated: Yes      HDL Cholesterol: 40 mg/dL      Total Cholesterol: 162 mg/dL      ASSESSMENT:    2019 ADA Guidelines Age:     >/= 36years old:   o History of ASCVD or 10-year ASCVD risk > 20% - high-intensity statin is recommended. o Previous outreach pharmD reduced dose to rosuvastatin 20 mg- need to confirm     PLAN:  Picked up rosuvastatin 20 mg on 9/25/20 for 90 day, 1 refill, billed through [address] [place] [address]. Pharmacist states patient has 3 active insurance now in their system. It went through [employer] for $0 co-pay this fill. Pharmacist states did not get [employer] on file until Oct 5th. She will put note in the system to bill through [employer] for next fill but will not be until end of Dec.     Thank you,    Pierce Vu, PharmD, [facility] Pharmacist  Direct: [phone], Ext [phone]  ==============================  CLINICAL PHARMACY CONSULT: MED RECONCILIATION/REVIEW ADDENDUM    For Pharmacy Admin Tracking Only    PHSO: Yes  Total # of Interventions Recommended: 1  - Updated Order #: 1 Updated Order Reason(s):  Other  - Maintenance Safety Lab Monitoring #: 1  Recommended intervention potential cost savings: 0  Total Interventions Accepted: 0  Time Spent (min): 15    Noris Swanson, [address] [place] [address]

## 2020-10-22 ENCOUNTER — TELEPHONE (OUTPATIENT)
Dept: INTERNAL MEDICINE CLINIC | Age: 70
End: 2020-10-22

## 2020-10-22 NOTE — TELEPHONE ENCOUNTER
Pt having some dental work done and Dr Jaz Cooper which is pt dentist doctor called wanted to know pt A1c number which was 8.0 as of 8/31 that was given to him however he has more questions regarding pt bp medication and requesting a call back pls advise

## 2020-11-17 ASSESSMENT — ENCOUNTER SYMPTOMS
RESPIRATORY NEGATIVE: 1
GASTROINTESTINAL NEGATIVE: 1

## 2020-11-17 NOTE — PROGRESS NOTES
[address]   Congestive Heart Failure    PrimaryCare Doctor:  Em Mayes MD      Chief Complaint:  CHF    History of Present Illness:  Kvng Lee is a 65 y.o. male with PMH COPD, lung cancer, HTN, DM, HLD, HFmrEF (40-45%) who presents today for CHF f/u. OV 6months ago: tachy - increase coreg to 25mg twice a day  Today he states that he feels great, very active - cleaned gutters, cuts his grass with a push mower and he denies chest pain, dyspnea, palpitations, orthopnea, PND, exertional chest pressure/discomfort, fatigue, early saiety, edema, syncope. wts stable    ER Visit: No  Recent Hospitalization: No    Baseline Weight: 131-132lb    Baseline BNP:<500    EF: 40-45%  Cardiac Imaging:  Echo 12/18/2018:  Summary   -Technically difficult examination. Lung Cancer.   -Normal left ventricular cavity size and wall thickness.   -Moderately reduced systolic function with an ejection fraction of 40-45%.  -No obvious regional wall motion abnormalities noted.   -Normal diastolic function.   - Mild mitral regurgitation.   -There is sclerotic nodular thickening of the aortic valve cusps.  -Thickening on the tricuspid annulus.   -Trivial tricuspid regurgitation with a RVSP of 29 mmHg.     Echo 8/8/2018:   Summary   -This is a limited exam to evaluate aortic valve. Patient had a complete   exam 4/12/2018   -Normal global systolic function with an ejection fraction of 55%.  -No obvious regional wall motion abnormalities noted.   -There is sclerotic nodular thickening of the aortic valve cusps without evidence of significant stenosis or regurgitation.     MPI 4/12/18:  Summary   No EKG evidence for ischemia with exercise   Normal LV size and systolic function.   There is normal isotope uptake at stress and rest. There is no evidence of  myocardial ischemia or scar.      Device: No   ICD counseling:No     Activity: at baseline  Can you walk 1-2 blocks or do a moderate amount of house/yard work? Yes    NYHA Class: I     Sodium Restrictions: 3g  Fluid Restrictions: 48-64 oz/day  Sodium and fluid restriction compliance: good    Pt Education: The patient has received education on the following topics: dietary sodium restriction, heart failure medications, the importance of physical activity, symptom management and weight monitoring     Past Medical History:   has a past medical history of Allergic rhinitis, cause unspecified, Cancer ([facility] [address] Colon polyps, Diabetes mellitus ([facility] [address] Essential hypertension, benign, HYPERCHOLESTERAEMIA, Hypertension, Lipoma of other specified sites, Other abnormal blood chemistry, Other and unspecified hyperlipidemia, Other symptoms involving cardiovascular system, and Psychosexual dysfunction with inhibited sexual excitement. Surgical History:   has a past surgical history that includes Colonoscopy (10/31/2007); Colonoscopy (2012); bronchoscopy (Right, 04/27/2017); bronchoscopy (04/24/2017); Inguinal hernia repair (Right, 1975); bronchoscopy (09/12/2017); hernia repair (Left, 8/14/2019); and Cystoscopy (N/A, 8/14/2019). Social History:   reports that he quit smoking about 3 years ago. His smoking use included cigarettes. He started smoking about 55 years ago. He has a 20.00 pack-year smoking history. He has never used smokeless tobacco. He reports current alcohol use. He reports previous drug use. Drug: Marijuana. Family History:   Family History   Problem Relation Age of Onset    Breast Cancer Mother      HomeMedications:  Prior to Admission medications    Medication Sig Start Date End Date Taking?  Authorizing Provider   rosuvastatin (CRESTOR) 20 MG tablet Take 1 tablet by mouth daily 9/24/20   Keesha Skaggs MD   carvedilol (COREG) 25 MG tablet TAKE 1 TABLET BY MOUTH TWICE A DAY WITH MEALS 9/1/20   NIDIA Sung - CNP   lisinopril (PRINIVIL;ZESTRIL) 40 MG tablet TAKE 1 TABLET BY MOUTH EVERY DAY 8/31/20   Keesha Skaggs MD   furosemide (LASIX) 20 MG tablet TAKE 1 TABLET BY MOUTH Thought Content: Thought content normal.         Judgment: Judgment normal.         Lab Data:    CBC:   Lab Results   Component Value Date    WBC 8.0 08/31/2020    WBC 7.3 05/21/2020    WBC 8.2 11/27/2019    RBC 4.29 08/31/2020    RBC 4.33 05/21/2020    RBC 4.14 11/27/2019    RBC 4.24 06/29/2017    RBC 4.04 06/22/2017    RBC 4.43 06/15/2017    HGB 12.9 08/31/2020    HGB 12.9 05/21/2020    HGB 12.4 11/27/2019    HCT 38.9 08/31/2020    HCT 38.5 05/21/2020    HCT 36.7 11/27/2019    MCV 90.6 08/31/2020    MCV 88.9 05/21/2020    MCV 88.6 11/27/2019    RDW 13.6 08/31/2020    RDW 13.3 05/21/2020    RDW 14.1 11/27/2019     08/31/2020     05/21/2020     11/27/2019     BMP:  Lab Results   Component Value Date     08/31/2020     05/21/2020     11/27/2019    K 3.8 08/31/2020    K 5.3 05/21/2020    K 4.7 11/27/2019    K 3.9 12/19/2018    K 3.9 04/13/2018     08/31/2020     05/21/2020    CL 99 11/27/2019    CO2 25 08/31/2020    CO2 29 05/21/2020    CO2 26 11/27/2019    PHOS 2.5 05/04/2017    BUN 16 08/31/2020    BUN 12 05/21/2020    BUN 12 11/27/2019    CREATININE 1.0 08/31/2020    CREATININE 0.9 05/21/2020    CREATININE 1.0 11/27/2019     BNP:   Lab Results   Component Value Date    PROBNP 149 05/21/2020    PROBNP 205 09/11/2019    PROBNP 575 03/13/2019     Iron Studies:  No components found for: FE,  TIBC,  FERRITIN  Iron Deficiency Anemia:  No    IV Iron Therapy:  No  2017 ACC/AHA HF Guidelines:   intravenous iron replacement in patients with New York Heart Association (NYHA) class II and III HF and iron deficiency (ferritin <100 ng/ml or 100-300 ng/ml if transferrin saturation <20%), to improve functional status and QoL. Assessment/Plan:    Encounter Diagnoses   Name     Dilated cardiomyopathy ([address] On GDT.  Repeat echo before next OV    Essential hypertension controlled    Chronic combined systolic and diastolic congestive heart failure (HCC) Compensated, labs done per PCP    Tachycardia improved         Instructions:   1. Medications:continue current medications  2. Lifestyle Recommendations: Weigh yourself every day in the morning after urination, call Jose Alejandro Anthony if wt increases 2-3lb in one day or 5lb in one week, Limit sodium to 2000mg/day and fluids to 2L or 64oz/day. 3. Follow up: 6 months with echo before         [place]: [phone]      I appreciate the opportunity of cooperating in the care of this individual.    Srini Lopez CNP, 11/17/2020,9:06 AM    QUALITY MEASURES  1. Tobacco Cessation Counseling: NA  2. Retake of BP if >140/90:   NA  3. Documentation to PCP/referring for new patient:  Sent to PCP at close of office visit  4. CAD patient on anti-platelet: NA  5. CAD patient on STATIN therapy:  NA  6. Patient with CHF and aFib on anticoagulation:  NA   7. Patient Education:NA   8. BB for LVSD :  Yes   9. ACE/ARB for LVSD:  Yes   10.  Left Ventricular Ejection Fraction (LVEF) Assessment:  Yes

## 2020-11-18 ENCOUNTER — OFFICE VISIT (OUTPATIENT)
Dept: CARDIOLOGY CLINIC | Age: 70
End: 2020-11-18
Payer: MEDICARE

## 2020-11-18 VITALS
OXYGEN SATURATION: 99 % | HEIGHT: 67 IN | SYSTOLIC BLOOD PRESSURE: 142 MMHG | BODY MASS INDEX: 20.09 KG/M2 | HEART RATE: 75 BPM | DIASTOLIC BLOOD PRESSURE: 80 MMHG | WEIGHT: 128 LBS

## 2020-11-18 PROCEDURE — 99214 OFFICE O/P EST MOD 30 MIN: CPT | Performed by: NURSE PRACTITIONER

## 2020-12-21 ENCOUNTER — VIRTUAL VISIT (OUTPATIENT)
Dept: PULMONOLOGY | Age: 70
End: 2020-12-21
Payer: MEDICARE

## 2020-12-21 PROCEDURE — 99442 PR PHYS/QHP TELEPHONE EVALUATION 11-20 MIN: CPT | Performed by: INTERNAL MEDICINE

## 2020-12-21 RX ORDER — FLUTICASONE PROPIONATE 50 MCG
2 SPRAY, SUSPENSION (ML) NASAL DAILY
Qty: 3 BOTTLE | Refills: 6 | Status: SHIPPED | OUTPATIENT
Start: 2020-12-21 | End: 2021-01-20 | Stop reason: ALTCHOICE

## 2020-12-21 NOTE — PROGRESS NOTES
Pulmonology Telephone Visit    Pursuant to the emergency declaration under the [facility] waiver authority and the [employer] General Act this Telephone Visit was insisted, with patient's consent, to reduce the patient's risk of exposure to COVID-19 and provide continuity of care for an established patient. The patient was at home, while the provider was at the clinic. Services were provided through a synchronous discussion through a Telephone Call to substitute for in-person clinic visit, and coded as such. Total time spent with patient was 13 minutes. [facility] Pulmonary and Critical [facility]    Outpatient Follow Up Note    Subjective:   CHIEF COMPLAINT / HPI:     The patient is 65 y.o. male who presents today for COPD, and lung cancer. Myriam Graham says he's coughing a bit for the last month. The albuterol inhaler helps the cough. He states that the cough occurs when he is doing work in the yard about once a week and when he lays down at night he will start coughing. He does report some sinus drainage. Previous history:  Myriam Graham is doing pretty well, however, he was admitted to [place] in December for shortness of breath from new systolic heart failure. He had a CTPA which was negative for clot and showed a stable esophageal lymph node. He had an echo showeing an EF of 40-45%. He's now on lasix and coreg. Previous history: Since last time I saw Mr. Kailee Herron in the office I performed a bronchoscopy with intent to do ebus however he had complete obstruction of his right upper lobe bronchus required debulking and biopsy and eventually stenting of the airway by Dr. Chano Espinoza. Patient's lung biopsies came back with squamous cell carcinoma. Following the stent placement which blocks off his right upper lobe Mr. Kailee Herron developed an asymptomatic right sided pneumothorax with complete collapse of the right lung.   He was admitted to the hospital and we placed a small bore chest tube which evacuated the pneumothorax and then the chest tube fell out a day and a half later without resolution of the pneumothorax is in the hospital.  However, when he went to his next CT simulation for radiation oncology 4 days later the pneumothorax had recurred. Instead of admitting him this time because he was again asymptomatic, I sent him to interventional radiology where a new small bore chest tube was placed and attached to a pneumo stat. He's had a small chest tube in for 2 weeks without recurrence of the pneumothorax thus far. He's feeling pretty well today and had radiation therapy earlier today. He's concerned because the tubing on his chest tube is getting longer and he scraped the chest tube is coming out. He does not want to have a third one placed.      Past Medical History:    Past Medical History:   Diagnosis Date    Allergic rhinitis, cause unspecified 7/15/2010    Cancer ([facility] [address]     lung    Colon polyps     Diabetes mellitus ([facility] [address]     Essential hypertension, benign 7/15/2010    HYPERCHOLESTERAEMIA     Hypertension     Lipoma of other specified sites 7/15/2010    Other abnormal blood chemistry 7/15/2010    Other and unspecified hyperlipidemia 7/15/2010    Other symptoms involving cardiovascular system 7/15/2010    Psychosexual dysfunction with inhibited sexual excitement 7/15/2010       Social History:    Social History     Tobacco Use   Smoking Status Former Smoker    Packs/day: 0.50    Years: 40.00    Pack years: 20.00    Types: Cigarettes    Start date: 7/29/1965   Floydene Ada Quit date: 4/23/2017    Years since quitting: 3.6   Smokeless Tobacco Never Used   Tobacco Comment    To try gum or patch to start quiting classes        Current Medications:  Current Outpatient Medications on File Prior to Visit   Medication Sig Dispense Refill    rosuvastatin (CRESTOR) 20 MG tablet Take 1 tablet by mouth daily 90 tablet 1    carvedilol (COREG) 25 MG tablet TAKE 1 TABLET BY MOUTH TWICE A DAY WITH MEALS 180 tablet 1    lisinopril (PRINIVIL;ZESTRIL) 40 MG tablet TAKE 1 TABLET BY MOUTH EVERY DAY 90 tablet 2    furosemide (LASIX) 20 MG tablet TAKE 1 TABLET BY MOUTH EVERY DAY 90 tablet 1    dapagliflozin (FARXIGA) 5 MG tablet Take 1 tablet by mouth every morning 90 tablet 1    insulin glargine (LANTUS) 100 UNIT/ML injection pen INJECT 20 UNITS INTO THE SKIN NIGHTLY INDICATIONS: 18-20 UNITS AT AT BEDTIME 18 pen 3    omeprazole (PRILOSEC) 20 MG delayed release capsule Take 20 mg by mouth as needed       amLODIPine (NORVASC) 10 MG tablet TAKE 1 TABLET BY MOUTH EVERY DAY 90 tablet 0    aspirin 81 MG tablet Take 81 mg by mouth daily       No current facility-administered medications on file prior to visit. REVIEW OF SYSTEMS:    CONSTITUTIONAL: Negative for fevers and chills  HEENT: Negative for oropharyngeal exudate, post nasal drip, sinus pain / pressure, nasal congestion, ear pain  RESPIRATORY:  See HPI  CARDIOVASCULAR: Negative for chest pain, palpitations, edema  GASTROINTESTINAL: Negative for nausea, vomiting, diarrhea, constipation and abdominal pain  HEMATOLOGICAL: Negative for adenopathy  SKIN: Negative for clubbing, cyanosis, skin lesions  EXTREMITIES: Negative for weakness, decreased ROM  NEUROLOGICAL: Negative for unilateral weakness, speech or gait abnormalities  PSYCH: Negative for anxiety, depression    Objective:   PHYSICAL EXAM:        VITALS:  There were no vitals taken for this visit. No exam as this was a phone visit    DATA:      Radiology Review:  Pertinent images / reports were reviewed as a part of this visit. Chest x-ray reveals the following:     Post procedural chest film shows the small bore chest tube   extending to the lung apex with virtually complete reexpansion of   the right lung following manual evacuation of pneumothorax.    Consolidated lung in the right upper chest is consistent with atelectasis. Trachea is midline.       Impression:       Chest tube placement with reexpansion of the right lung         3/18  No change since 1/24/2018, with extensive atelectasis in the right   lung and soft tissue thickening in the right hilar region. 10/18  Impression       1.  Posttherapeutic changes reidentified in the right upper lobe including confluent airspace disease posteriorly into the superior segment of the lower lobe, similar to the previous study. There are slightly increased groundglass and reticular opacities    anteriorly in the upper lobe, which are nonspecific but could represent ongoing fibrosis versus infection or inflammation. Attention on follow-up.       2.  The small amount of loculated right apical pleural air on the previous study has been replaced with a small amount of pleural fluid.       3.  Borderline sized right paraesophageal lymph node, increased from previous study. 3/2019  Impression       1. Posttherapy changes noted with consolidation and bronchiectasis right apex.         No evidence recurrent or residual neoplasm.       Subcentimeter pretracheal lymph node unchanged benign. No significant lymphadenopathy.       Coronary artery calcification which is a risk factor for acute coronary syndrome.       Mild compression fracture superior endplate T7 with adjacent bony sclerosis may relate to posttraumatic or osteoporotic compression fracture. Pathologic compression fracture is not excluded. Continued follow-up recommended. Findings present previously     9/2019  Impression       Consolidation/atelectasis and volume loss in the right thorax without change.       Chronic pancreatitis.       Stable small left pulmonary nodules. Last PFTs: CONCLUSION:  Moderate obstructive defect without bronchodilator  response. Air trapping present and moderate decrease in diffusion. These findings are most consistent with the diagnosis of COPD. Assessment:    This is a 65 y.o. 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 Albuterol does seem to help the symptoms sound a little bit more like asthma. Wrote for a nasal steroid and will consider using a long-acting inhaler if the cough persists or worsens. CT in December and March in late August showed no changes. He is due for a 6-month follow-up CT in late February. CHF:  On coreg, lisinopril, norvasc and lasix. Well compensated    The patient is not currently smoking. Recommend maintaining a smoke-free lifestyle. More than half the time of this 13 minute visit was spent counseling the patient. RTC 3 months w/ MD. Call or RTC sooner if symptoms persist or worsen acutely.       Abrahan Cali

## 2020-12-28 NOTE — TELEPHONE ENCOUNTER
Prescription refill    Last OV:11/18/20    Last Refill:06/23/20    Labs:08/31/20    Future Appt:none scheduled at this time

## 2020-12-29 RX ORDER — FUROSEMIDE 20 MG/1
TABLET ORAL
Qty: 90 TABLET | Refills: 1 | Status: SHIPPED | OUTPATIENT
Start: 2020-12-29 | End: 2021-05-25

## 2021-01-13 ENCOUNTER — HOSPITAL ENCOUNTER (OUTPATIENT)
Age: 71
Discharge: HOME | End: 2021-01-13
Payer: MEDICARE

## 2021-01-13 DIAGNOSIS — Z79.4 TYPE 2 DIABETES MELLITUS WITHOUT COMPLICATION, WITH LONG-TERM CURRENT USE OF INSULIN (HCC): ICD-10-CM

## 2021-01-13 DIAGNOSIS — C34.01 MALIGNANT NEOPLASM OF RIGHT MAIN BRONCHUS (HCC): ICD-10-CM

## 2021-01-13 DIAGNOSIS — J30.2 SEASONAL ALLERGIC RHINITIS, UNSPECIFIED TRIGGER: Chronic | ICD-10-CM

## 2021-01-13 DIAGNOSIS — E11.9 TYPE 2 DIABETES MELLITUS WITHOUT COMPLICATION, WITH LONG-TERM CURRENT USE OF INSULIN (HCC): ICD-10-CM

## 2021-01-13 DIAGNOSIS — E78.2 MIXED HYPERLIPIDEMIA: ICD-10-CM

## 2021-01-13 DIAGNOSIS — C34.2 PRIMARY CANCER OF RIGHT MIDDLE LOBE OF LUNG (HCC): ICD-10-CM

## 2021-01-13 DIAGNOSIS — I50.42 CHRONIC COMBINED SYSTOLIC AND DIASTOLIC CONGESTIVE HEART FAILURE (HCC): ICD-10-CM

## 2021-01-13 DIAGNOSIS — M1A.0490 IDIOPATHIC CHRONIC GOUT OF HAND WITHOUT TOPHUS, UNSPECIFIED LATERALITY: ICD-10-CM

## 2021-01-13 DIAGNOSIS — R35.1 NOCTURIA: ICD-10-CM

## 2021-01-13 DIAGNOSIS — J44.9 COPD, MODERATE (HCC): ICD-10-CM

## 2021-01-13 DIAGNOSIS — I10 ESSENTIAL HYPERTENSION: ICD-10-CM

## 2021-01-13 DIAGNOSIS — C67.2 MALIGNANT NEOPLASM OF LATERAL WALL OF URINARY BLADDER (HCC): ICD-10-CM

## 2021-01-13 DIAGNOSIS — K21.9 GASTROESOPHAGEAL REFLUX DISEASE WITHOUT ESOPHAGITIS: ICD-10-CM

## 2021-01-13 LAB
A/G RATIO: 1.3 (ref 1.1–2.2)
ALBUMIN SERPL-MCNC: 4.6 G/DL (ref 3.4–5)
ALP BLD-CCNC: 157 U/L (ref 40–129)
ALT SERPL-CCNC: 79 U/L (ref 10–40)
ANION GAP SERPL CALCULATED.3IONS-SCNC: 13 MMOL/L (ref 3–16)
AST SERPL-CCNC: 77 U/L (ref 15–37)
BASOPHILS ABSOLUTE: 0.1 K/UL (ref 0–0.2)
BASOPHILS RELATIVE PERCENT: 0.9 %
BILIRUB SERPL-MCNC: 0.3 MG/DL (ref 0–1)
BILIRUBIN URINE: NEGATIVE
BLOOD, URINE: NEGATIVE
BUN BLDV-MCNC: 15 MG/DL (ref 7–20)
CALCIUM SERPL-MCNC: 9.6 MG/DL (ref 8.3–10.6)
CHLORIDE BLD-SCNC: 103 MMOL/L (ref 99–110)
CHOLESTEROL, TOTAL: 158 MG/DL (ref 0–199)
CLARITY: CLEAR
CO2: 24 MMOL/L (ref 21–32)
COLOR: YELLOW
CREAT SERPL-MCNC: 0.9 MG/DL (ref 0.8–1.3)
CREATININE URINE: 156.8 MG/DL (ref 39–259)
EOSINOPHILS ABSOLUTE: 0.1 K/UL (ref 0–0.6)
EOSINOPHILS RELATIVE PERCENT: 1 %
EPITHELIAL CELLS, UA: 1 /HPF (ref 0–5)
GFR AFRICAN AMERICAN: >60
GFR NON-AFRICAN AMERICAN: >60
GLOBULIN: 3.5 G/DL
GLUCOSE BLD-MCNC: 115 MG/DL (ref 70–99)
GLUCOSE URINE: NEGATIVE MG/DL
HCT VFR BLD CALC: 39.7 % (ref 40.5–52.5)
HDLC SERPL-MCNC: 48 MG/DL (ref 40–60)
HEMOGLOBIN: 13.5 G/DL (ref 13.5–17.5)
HYALINE CASTS: 1 /LPF (ref 0–8)
KETONES, URINE: NEGATIVE MG/DL
LDL CHOLESTEROL CALCULATED: 88 MG/DL
LEUKOCYTE ESTERASE, URINE: NEGATIVE
LYMPHOCYTES ABSOLUTE: 1.1 K/UL (ref 1–5.1)
LYMPHOCYTES RELATIVE PERCENT: 15.9 %
MCH RBC QN AUTO: 30 PG (ref 26–34)
MCHC RBC AUTO-ENTMCNC: 34 G/DL (ref 31–36)
MCV RBC AUTO: 88.3 FL (ref 80–100)
MICROALBUMIN UR-MCNC: 6.3 MG/DL
MICROALBUMIN/CREAT UR-RTO: 40.2 MG/G (ref 0–30)
MICROSCOPIC EXAMINATION: YES
MONOCYTES ABSOLUTE: 0.4 K/UL (ref 0–1.3)
MONOCYTES RELATIVE PERCENT: 6.1 %
NEUTROPHILS ABSOLUTE: 5.3 K/UL (ref 1.7–7.7)
NEUTROPHILS RELATIVE PERCENT: 76.1 %
NITRITE, URINE: NEGATIVE
PDW BLD-RTO: 13.5 % (ref 12.4–15.4)
PH UA: 6 (ref 5–8)
PLATELET # BLD: 208 K/UL (ref 135–450)
PMV BLD AUTO: 8.5 FL (ref 5–10.5)
POTASSIUM SERPL-SCNC: 4.1 MMOL/L (ref 3.5–5.1)
PROSTATE SPECIFIC ANTIGEN: 1.15 NG/ML (ref 0–4)
PROTEIN UA: 30 MG/DL
RBC # BLD: 4.49 M/UL (ref 4.2–5.9)
RBC UA: 1 /HPF (ref 0–4)
SODIUM BLD-SCNC: 140 MMOL/L (ref 136–145)
SPECIFIC GRAVITY UA: 1.02 (ref 1–1.03)
TOTAL PROTEIN: 8.1 G/DL (ref 6.4–8.2)
TRIGL SERPL-MCNC: 112 MG/DL (ref 0–150)
URIC ACID, SERUM: 6 MG/DL (ref 3.5–7.2)
URINE REFLEX TO CULTURE: ABNORMAL
URINE TYPE: ABNORMAL
UROBILINOGEN, URINE: 0.2 E.U./DL
VLDLC SERPL CALC-MCNC: 22 MG/DL
WBC # BLD: 7 K/UL (ref 4–11)
WBC UA: 3 /HPF (ref 0–5)

## 2021-01-13 PROCEDURE — 83036 HEMOGLOBIN GLYCOSYLATED A1C: CPT

## 2021-01-13 PROCEDURE — 80053 COMPREHEN METABOLIC PANEL: CPT

## 2021-01-13 PROCEDURE — 81001 URINALYSIS AUTO W/SCOPE: CPT

## 2021-01-13 PROCEDURE — 85025 COMPLETE CBC W/AUTO DIFF WBC: CPT

## 2021-01-13 PROCEDURE — 82043 UR ALBUMIN QUANTITATIVE: CPT

## 2021-01-13 PROCEDURE — 84153 ASSAY OF PSA TOTAL: CPT

## 2021-01-13 PROCEDURE — 82570 ASSAY OF URINE CREATININE: CPT

## 2021-01-13 PROCEDURE — 84550 ASSAY OF BLOOD/URIC ACID: CPT

## 2021-01-13 PROCEDURE — 80061 LIPID PANEL: CPT

## 2021-01-14 LAB
ESTIMATED AVERAGE GLUCOSE: 191.5 MG/DL
HBA1C MFR BLD: 8.3 %

## 2021-01-19 DIAGNOSIS — E11.9 TYPE 2 DIABETES MELLITUS WITHOUT COMPLICATION, WITH LONG-TERM CURRENT USE OF INSULIN (HCC): ICD-10-CM

## 2021-01-19 DIAGNOSIS — Z79.4 TYPE 2 DIABETES MELLITUS WITHOUT COMPLICATION, WITH LONG-TERM CURRENT USE OF INSULIN (HCC): ICD-10-CM

## 2021-01-19 RX ORDER — DAPAGLIFLOZIN 5 MG/1
TABLET, FILM COATED ORAL
Qty: 90 TABLET | Refills: 1 | Status: SHIPPED | OUTPATIENT
Start: 2021-01-19 | End: 2021-01-20 | Stop reason: SDUPTHER

## 2021-01-20 ENCOUNTER — OFFICE VISIT (OUTPATIENT)
Dept: INTERNAL MEDICINE CLINIC | Age: 71
End: 2021-01-20
Payer: MEDICARE

## 2021-01-20 VITALS
HEIGHT: 67 IN | WEIGHT: 129 LBS | DIASTOLIC BLOOD PRESSURE: 82 MMHG | RESPIRATION RATE: 14 BRPM | SYSTOLIC BLOOD PRESSURE: 132 MMHG | HEART RATE: 66 BPM | BODY MASS INDEX: 20.25 KG/M2 | TEMPERATURE: 97.7 F

## 2021-01-20 DIAGNOSIS — Z00.00 ROUTINE GENERAL MEDICAL EXAMINATION AT A HEALTH CARE FACILITY: ICD-10-CM

## 2021-01-20 DIAGNOSIS — I10 ESSENTIAL HYPERTENSION: ICD-10-CM

## 2021-01-20 DIAGNOSIS — E11.9 TYPE 2 DIABETES MELLITUS WITHOUT COMPLICATION, WITH LONG-TERM CURRENT USE OF INSULIN (HCC): Primary | ICD-10-CM

## 2021-01-20 DIAGNOSIS — I50.42 CHRONIC COMBINED SYSTOLIC AND DIASTOLIC CONGESTIVE HEART FAILURE (HCC): ICD-10-CM

## 2021-01-20 DIAGNOSIS — G25.81 RLS (RESTLESS LEGS SYNDROME): ICD-10-CM

## 2021-01-20 DIAGNOSIS — M1A.0490 IDIOPATHIC CHRONIC GOUT OF HAND WITHOUT TOPHUS, UNSPECIFIED LATERALITY: ICD-10-CM

## 2021-01-20 DIAGNOSIS — D17.79 LIPOMA OF OTHER SPECIFIED SITES: ICD-10-CM

## 2021-01-20 DIAGNOSIS — C34.91 SQUAMOUS CELL CARCINOMA OF RIGHT LUNG (HCC): ICD-10-CM

## 2021-01-20 DIAGNOSIS — C77.1 SECONDARY MALIGNANT NEOPLASM OF INTRATHORACIC LYMPH NODES (HCC): ICD-10-CM

## 2021-01-20 DIAGNOSIS — Z00.00 WELL ADULT EXAM: ICD-10-CM

## 2021-01-20 DIAGNOSIS — C67.2 MALIGNANT NEOPLASM OF LATERAL WALL OF URINARY BLADDER (HCC): ICD-10-CM

## 2021-01-20 DIAGNOSIS — E55.9 VITAMIN D DEFICIENCY: ICD-10-CM

## 2021-01-20 DIAGNOSIS — J44.9 COPD, MODERATE (HCC): ICD-10-CM

## 2021-01-20 DIAGNOSIS — C34.01 MALIGNANT NEOPLASM OF RIGHT MAIN BRONCHUS (HCC): ICD-10-CM

## 2021-01-20 DIAGNOSIS — K21.9 GASTROESOPHAGEAL REFLUX DISEASE WITHOUT ESOPHAGITIS: ICD-10-CM

## 2021-01-20 DIAGNOSIS — J30.2 SEASONAL ALLERGIC RHINITIS, UNSPECIFIED TRIGGER: Chronic | ICD-10-CM

## 2021-01-20 DIAGNOSIS — C34.2 PRIMARY CANCER OF RIGHT MIDDLE LOBE OF LUNG (HCC): ICD-10-CM

## 2021-01-20 DIAGNOSIS — E78.2 MIXED HYPERLIPIDEMIA: ICD-10-CM

## 2021-01-20 DIAGNOSIS — Z79.4 TYPE 2 DIABETES MELLITUS WITHOUT COMPLICATION, WITH LONG-TERM CURRENT USE OF INSULIN (HCC): Primary | ICD-10-CM

## 2021-01-20 PROBLEM — R00.0 TACHYCARDIA: Status: RESOLVED | Noted: 2020-05-14 | Resolved: 2021-01-20

## 2021-01-20 PROBLEM — R19.5 CHANGE IN CONSISTENCY OF STOOL: Status: RESOLVED | Noted: 2020-01-06 | Resolved: 2021-01-20

## 2021-01-20 PROCEDURE — 3052F HG A1C>EQUAL 8.0%<EQUAL 9.0%: CPT | Performed by: INTERNAL MEDICINE

## 2021-01-20 PROCEDURE — 99214 OFFICE O/P EST MOD 30 MIN: CPT | Performed by: INTERNAL MEDICINE

## 2021-01-20 PROCEDURE — G0439 PPPS, SUBSEQ VISIT: HCPCS | Performed by: INTERNAL MEDICINE

## 2021-01-20 ASSESSMENT — PATIENT HEALTH QUESTIONNAIRE - PHQ9
SUM OF ALL RESPONSES TO PHQ QUESTIONS 1-9: 0
2. FEELING DOWN, DEPRESSED OR HOPELESS: 0
SUM OF ALL RESPONSES TO PHQ QUESTIONS 1-9: 0

## 2021-01-20 ASSESSMENT — ENCOUNTER SYMPTOMS
ALLERGIC/IMMUNOLOGIC NEGATIVE: 1
RHINORRHEA: 1
GASTROINTESTINAL NEGATIVE: 1
RESPIRATORY NEGATIVE: 1
EYES NEGATIVE: 1

## 2021-01-20 ASSESSMENT — LIFESTYLE VARIABLES
AUDIT-C TOTAL SCORE: 1
HOW MANY STANDARD DRINKS CONTAINING ALCOHOL DO YOU HAVE ON A TYPICAL DAY: 0
HOW OFTEN DO YOU HAVE SIX OR MORE DRINKS ON ONE OCCASION: 0

## 2021-01-20 NOTE — PROGRESS NOTES
Subjective:      Patient ID: Reyna Macdonald is a 65 y.o. male. HPI  Here today for complete physical and review of chronic problems as listed under assessment and plan,no new c/o feels good     Hyperlipidemia  This is a chronic problem. The current episode started more than 1 year ago. The problem is controlled. Recent lipid tests were reviewed and are low. Exacerbating diseases include diabetes. There are no known factors aggravating his hyperlipidemia. Pertinent negatives include no chest pain. Current antihyperlipidemic treatment includes diet change, exercise and statins. The current treatment provides significant improvement of lipids. There are no compliance problems. Risk factors for coronary artery disease include dyslipidemia, diabetes mellitus and hypertension. Hypertension  This is a chronic problem. The current episode started more than 1 year ago. The problem has been waxing and waning since onset. The problem is controlled. Pertinent negatives include no chest pain, headaches or palpitations. Risk factors for coronary artery disease include diabetes mellitus, male gender and dyslipidemia. Past treatments include ACE inhibitors, calcium channel blockers, diuretics, beta blockers and lifestyle changes. The current treatment provides significant improvement. There are no compliance problems. Hypertensive end-organ damage includes heart failure. Diabetes  He presents for his follow-up diabetic visit. He has type 2 diabetes mellitus. His disease course has been fluctuating. There are no hypoglycemic associated symptoms. Pertinent negatives for hypoglycemia include no headaches. Pertinent negatives for diabetes include no chest pain, no foot paresthesias and no polyphagia. Symptoms are stable. Diabetic complications include heart disease. Risk factors for coronary artery disease include diabetes mellitus, dyslipidemia and hypertension.  Current diabetic treatment includes diet, insulin injections and oral agent (monotherapy). He is compliant with treatment all of the time. His weight is fluctuating minimally. He is following a diabetic, low fat/cholesterol and low salt diet. Meal planning includes avoidance of concentrated sweets. He participates in exercise three times a week. His home blood glucose trend is fluctuating minimally. His breakfast blood glucose is taken between 7-8 am. His breakfast blood glucose range is generally 130-140 mg/dl. An ACE inhibitor/angiotensin II receptor blocker is being taken. He does not see a podiatrist.Eye exam is current. Allergies   Allergen Reactions    Metformin And Related Diarrhea       Current Outpatient Medications   Medication Sig Dispense Refill    dapagliflozin (FARXIGA) 10 MG tablet Take 1 tablet by mouth every morning 90 tablet 3    furosemide (LASIX) 20 MG tablet TAKE 1 TABLET BY MOUTH EVERY DAY 90 tablet 1    rosuvastatin (CRESTOR) 20 MG tablet Take 1 tablet by mouth daily 90 tablet 1    carvedilol (COREG) 25 MG tablet TAKE 1 TABLET BY MOUTH TWICE A DAY WITH MEALS 180 tablet 1    lisinopril (PRINIVIL;ZESTRIL) 40 MG tablet TAKE 1 TABLET BY MOUTH EVERY DAY 90 tablet 2    insulin glargine (LANTUS) 100 UNIT/ML injection pen INJECT 20 UNITS INTO THE SKIN NIGHTLY INDICATIONS: 18-20 UNITS AT AT BEDTIME 18 pen 3    omeprazole (PRILOSEC) 20 MG delayed release capsule Take 20 mg by mouth as needed       amLODIPine (NORVASC) 10 MG tablet TAKE 1 TABLET BY MOUTH EVERY DAY 90 tablet 0    aspirin 81 MG tablet Take 81 mg by mouth daily       No current facility-administered medications for this visit.         Past Medical History:   Diagnosis Date    Allergic rhinitis, cause unspecified 7/15/2010    Cancer ([facility] [address]     lung    Colon polyps     Diabetes mellitus ([facility] [address]     Essential hypertension, benign 7/15/2010    HYPERCHOLESTERAEMIA     Hypertension     Lipoma of other specified sites 7/15/2010    Other abnormal blood chemistry 7/15/2010    Other and unspecified hyperlipidemia 7/15/2010    Other symptoms involving cardiovascular system 7/15/2010    Psychosexual dysfunction with inhibited sexual excitement 7/15/2010       Family History   Problem Relation Age of Onset    Breast Cancer Mother        Past Surgical History:   Procedure Laterality Date    BRONCHOSCOPY Right 04/27/2017    Dr. Carole Block - w/R BI stent placement size 12x3    BRONCHOSCOPY  04/24/2017    FAVIO Aguilar - w/EBUS    BRONCHOSCOPY  09/12/2017    COLONOSCOPY  10/31/2007    polyps    COLONOSCOPY  2012    polyp- repeat 2017    COLONOSCOPY  02/2020    polyp Dr Bandar Mike repeat 2023     CYSTOSCOPY N/A 8/14/2019    CYSTOSCOPY TRANSURETHRAL RESECTION BLADDER performed by Poornima Hewitt MD at [place] [address] Left 8/14/2019    OPEN LEFT INGUINAL HERNIA REPAIR WITH MESH performed by Mayuri Rausch MD at [address] [place] [address]       Social History     Socioeconomic History    Marital status:      Spouse name: Not on file    Number of children: Not on file    Years of education: Not on file    Highest education level: Not on file   Occupational History    Not on file   Social Needs    Financial resource strain: Not on file    Food insecurity     Worry: Not on file     Inability: Not on file    Transportation needs     Medical: Not on file     Non-medical: Not on file   Tobacco Use    Smoking status: Former Smoker     Packs/day: 0.50     Years: 40.00     Pack years: 20.00     Types: Cigarettes     Start date: 7/29/1965     Quit date: 4/23/2017     Years since quitting: 3.7    Smokeless tobacco: Never Used    Tobacco comment:  To try gum or patch to start quiting classes    Substance and Sexual Activity    Alcohol use: Yes     Frequency: Monthly or less     Comment: rare    Drug use: Not Currently     Types: Marijuana    Sexual activity: Yes     Partners: Female     Comment: on occasion   Lifestyle    Physical activity     Days per week: Not on file     Minutes per session: Not on file    Stress: Not on file   Relationships    Social connections     Talks on phone: Not on file     Gets together: Not on file     Attends Jehovah's witness service: Not on file     Active member of club or organization: Not on file     Attends meetings of clubs or organizations: Not on file     Relationship status: Not on file    Intimate partner violence     Fear of current or ex partner: Not on file     Emotionally abused: Not on file     Physically abused: Not on file     Forced sexual activity: Not on file   Other Topics Concern    Not on file   Social History Narrative    Not on file       Review of Systems  Review of Systems   Constitutional: Positive for unexpected weight change (up a few # ). HENT: Positive for congestion, postnasal drip and rhinorrhea. Eyes: Negative. Reading glasses   Respiratory: Negative. S/P CA of Lung as noted cont F/U with Dr Prieto Lehigh etc    Cardiovascular: Negative. Negative for chest pain and palpitations. Gastrointestinal: Negative. Needs repeat colonoscopy for polyp  Was to be done 2017 but not done 2nd to lung CA -r repeat 2/20 - polyps repeat 2023  per Dr Treva Kulkarni   S/P L inguinal hernia 2019   Endocrine: Negative. Negative for polyphagia. Genitourinary: Positive for frequency. Nocturia  Needs PSA in future- had recent Cysto for prostate nodule Bx was negative done [facility] seeing Dr Gabriela Meckel for bladder CA see reports    Musculoskeletal: Negative. Back pain: improved         Occ RLS symptoms    Skin: Negative. Allergic/Immunologic: Negative. Neurological: Negative. Negative for numbness and headaches. Hematological: Negative. Psychiatric/Behavioral: Negative. Objective:   Physical Exam:  Physical Exam  Constitutional:       Appearance: He is well-developed. HENT:      Head: Normocephalic and atraumatic.       Right Ear: Tympanic membrane, ear canal and external ear normal. Left Ear: Tympanic membrane, ear canal and external ear normal.   Eyes:      Extraocular Movements: Extraocular movements intact. Conjunctiva/sclera: Conjunctivae normal.      Pupils: Pupils are equal, round, and reactive to light. Neck:      Musculoskeletal: Normal range of motion and neck supple. Thyroid: No thyromegaly. Trachea: No tracheal deviation. Cardiovascular:      Rate and Rhythm: Normal rate and regular rhythm. Heart sounds: Normal heart sounds. No murmur. Pulmonary:      Effort: Pulmonary effort is normal.      Breath sounds: Normal breath sounds. Abdominal:      General: Abdomen is flat. Bowel sounds are normal.      Palpations: Abdomen is soft. Genitourinary:     Comments: As per Dr Page Roper   Musculoskeletal: Normal range of motion. Skin:     General: Skin is warm and dry. Neurological:      General: No focal deficit present. Mental Status: He is alert and oriented to person, place, and time. Deep Tendon Reflexes: Reflexes are normal and symmetric. Psychiatric:         Mood and Affect: Mood normal.         Behavior: Behavior normal.         Thought Content: Thought content normal.         Judgment: Judgment normal.         /82 (Site: Right Upper Arm, Position: Sitting, Cuff Size: Medium Adult)   Pulse 66   Temp 97.7 °F (36.5 °C) (Temporal)   Resp 14   Ht 5' 7\" (1.702 m)   Wt 129 lb (58.5 kg)   BMI 20.20 kg/m²       Assessment & Plan:         Allergic rhinitis  Prn Tx with Flonase,Claritin and Mucinex DM     Lipoma of other specified sites  No symptoms cont to watch     Mixed hyperlipidemia  Stable continue current meds and return in 3 mo. Type 2 diabetes mellitus without complication, with long-term current use of insulin ([facility])  HgA1C slightly increase as noted will increase Farxiga to 10 mg and cont Lantis as noted ,Repeat labs in 3 mo. Essential hypertension  Stable continue current meds and return in 3 mo.     BP at home is ok Vitamin D deficiency  Continue current meds     RLS (restless legs syndrome)  No symptoms recently     Gout  Well controled with diet and meds     Primary cancer of right middle lobe of lung ([address]  Remains symptoms free and cancer free as noted repeat CT 4/2021    Secondary malignant neoplasm of intrathoracic lymph nodes ([address]  Remains stable no active diease     Squamous cell lung cancer ([facility] [address]  Cont F/U repeat CT 4/21     Lung cancer, main bronchus (HCC)  Cont F/U as noted     COPD, moderate (HCC)  Remains stable no current meds     Gastroesophageal reflux disease without esophagitis  Under good control with diet and meds     Chronic combined systolic and diastolic congestive heart failure (HCC)  Stable continue current meds and return in 3 mo.         Malignant neoplasm of lateral wall of urinary bladder (HCC)  Remains stable last Cyst was ok 12/21 F/U again in 6 mo     Well adult exam  Within normal limits for age-retired  no ADL issues,immunizations up to date, no depression ,no cognitive impairment  Colonoscopy up to date polyp 2/20 repeat 2023   Eye exam up to date  Exercises as tolerated    No living will but does not want resuscitation info given    Findings and recommendations discussed with Pt      Vandana Tan  1950    Allergies   Allergen Reactions    Metformin And Related Diarrhea     Current Outpatient Medications   Medication Sig Dispense Refill    dapagliflozin (FARXIGA) 10 MG tablet Take 1 tablet by mouth every morning 90 tablet 3    furosemide (LASIX) 20 MG tablet TAKE 1 TABLET BY MOUTH EVERY DAY 90 tablet 1    rosuvastatin (CRESTOR) 20 MG tablet Take 1 tablet by mouth daily 90 tablet 1    carvedilol (COREG) 25 MG tablet TAKE 1 TABLET BY MOUTH TWICE A DAY WITH MEALS 180 tablet 1    lisinopril (PRINIVIL;ZESTRIL) 40 MG tablet TAKE 1 TABLET BY MOUTH EVERY DAY 90 tablet 2    insulin glargine (LANTUS) 100 UNIT/ML injection pen INJECT 20 UNITS INTO THE SKIN NIGHTLY INDICATIONS: 18-20 UNITS AT AT BEDTIME 18 pen 3    omeprazole (PRILOSEC) 20 MG delayed release capsule Take 20 mg by mouth as needed       amLODIPine (NORVASC) 10 MG tablet TAKE 1 TABLET BY MOUTH EVERY DAY 90 tablet 0    aspirin 81 MG tablet Take 81 mg by mouth daily       No current facility-administered medications for this visit. Vitals:    01/20/21 0931   BP: 132/82   Site: Right Upper Arm   Position: Sitting   Cuff Size: Medium Adult   Pulse: 66   Resp: 14   Temp: 97.7 °F (36.5 °C)   TempSrc: Temporal   Weight: 129 lb (58.5 kg)   Height: 5' 7\" (1.702 m)     Body mass index is 20.2 kg/m². Wt Readings from Last 3 Encounters:   01/20/21 129 lb (58.5 kg)   11/18/20 128 lb (58.1 kg)   09/14/20 127 lb 12.8 oz (58 kg)     BP Readings from Last 3 Encounters:   01/20/21 132/82   11/18/20 (!) 142/80   09/14/20 120/78       Consultants:   Patient Care Team:  Reanna Hills MD as PCP - General (Internal Medicine)  Reanna Hills MD as PCP - REHABILITATION [facility] Empaneled Provider  Carl Zuleta MD as Consulting Physician (Pulmonology)  Mercedes Cunningham MD as Consulting Physician (Radiation Oncology)  Belén Preshussein, RN as Registered Nurse  Cuong Maravilla MD as Consulting Physician (General Surgery)  Tien Echavarria, APRN - CNP as Nurse Practitioner (Nurse Practitioner Acute Care)  Kimmy Smith MD as Consulting Physician (Urology)    Chief Complaint:   Giovana Pagan is a 65 y.o. male who presents for Medicare Preventive Physical Examination with Personalized Prevention Plan Services.     HPI: Tr review listed chronic problems     Patient Active Problem List   Diagnosis    Psychosexual dysfunction with inhibited sexual excitement    Lipoma of other specified sites    Allergic rhinitis    Mixed hyperlipidemia    Type 2 diabetes mellitus without complication, with long-term current use of insulin ([address]    Essential hypertension    Vitamin D deficiency    RLS (restless legs syndrome)    Gout    Primary cancer of right middle lobe of lung ([address]    Secondary malignant neoplasm of intrathoracic lymph nodes (HCC)    Squamous cell lung cancer (HCC)    Atelectasis of right lung    Well adult exam    Chronic bronchitis ([address]    Lung cancer[facility] main bronchus ([facility] [address]    COPD, moderate (HCC)    Gastroesophageal reflux disease without esophagitis    Chronic combined systolic and diastolic congestive heart failure (HCC)    Dilated cardiomyopathy (HCC)    Malignant neoplasm of lateral wall of urinary bladder (HCC)       Mood Disorders Screen:  Risk factors: none  Symptoms:  endorses none, denies depressed mood, difficulty concentrating, difficulty sleeping, reduced interest in activities and changes in appetite     Safety Assessment:  Functional ability/ADLs:  Difficulty with bathing- no, grooming- no, meals- no, incontinence- no.  Driving- yes. Home safety: Lives with family wife. Number of stairs to enter home: 1, within home: 0. Risk factors for falls: none. Home environment modifications:  no.     End of Life Planning:  Advanced Directive: has NO advanced directive  - add't info requested.  Referral to SW: no.      Preventive Care:  Self-testicular exams: Yes  Last PSA: 1.15, normal  Last colonoscopy: 2/20, abnormal  AAA screening:  no   Last eye exam: 2020, glasses  Exercise: yes  Seatbelt use: yes      Immunization History   Administered Date(s) Administered    Influenza Vaccine, unspecified formulation 11/16/2015, 10/31/2016    Influenza Virus Vaccine 01/02/2013, 10/07/2013, 10/01/2014    Influenza Whole 01/02/2013, 10/07/2013, 10/03/2014    Influenza, High Dose (Fluzone 65 yrs and older) 11/16/2015, 10/31/2016, 10/10/2018    Influenza, Quadv, adjuvanted, 65 yrs +, IM, PF (Fluad) 09/14/2020    Influenza, Triv, inactivated, subunit, adjuvanted, IM (Fluad 65 yrs and older) 09/17/2019    Pneumococcal Conjugate 13-valent ([id number]) 06/29/2015    Pneumococcal Polysaccharide ([id number]) 07/29/2016    Td vaccine (adult) 01/04/2013    Tdap (Boostrix, Adacel) 11/21/2009       Past Medical History:   Diagnosis Date    Allergic rhinitis, cause unspecified 7/15/2010    Cancer ([facility] [address]     lung    Colon polyps     Diabetes mellitus ([facility] [address]     Essential hypertension, benign 7/15/2010    HYPERCHOLESTERAEMIA     Hypertension     Lipoma of other specified sites 7/15/2010    Other abnormal blood chemistry 7/15/2010    Other and unspecified hyperlipidemia 7/15/2010    Other symptoms involving cardiovascular system 7/15/2010    Psychosexual dysfunction with inhibited sexual excitement 7/15/2010     Past Surgical History:   Procedure Laterality Date    BRONCHOSCOPY Right 04/27/2017    Dr. Zach Gannon - w/R BI stent placement size 12x3    BRONCHOSCOPY  04/24/2017    FAVIO forman/EBUS    BRONCHOSCOPY  09/12/2017    COLONOSCOPY  10/31/2007    polyps    COLONOSCOPY  2012    polyp- repeat 2017    COLONOSCOPY  02/2020    polyp Dr Dorcas Velasquez repeat 2023     CYSTOSCOPY N/A 8/14/2019    CYSTOSCOPY TRANSURETHRAL RESECTION BLADDER performed by Ryan Lerma MD at [place] [address] Left 8/14/2019    OPEN LEFT INGUINAL HERNIA REPAIR WITH MESH performed by Adriana Ortega MD at [address] [place] [address] 1975     Family History   Problem Relation Age of Onset    Breast Cancer Mother      Social History     Socioeconomic History    Marital status:      Spouse name: Not on file    Number of children: Not on file    Years of education: Not on file    Highest education level: Not on file   Occupational History    Not on file   Social Needs    Financial resource strain: Not on file    Food insecurity     Worry: Not on file     Inability: Not on file    Transportation needs     Medical: Not on file     Non-medical: Not on file   Tobacco Use    Smoking status: Former Smoker     Packs/day: 0.50     Years: 40.00     Pack years: 20.00     Types: Cigarettes     Start date: 7/29/1965     Quit date: 4/23/2017 Years since quitting: 3.7    Smokeless tobacco: Never Used    Tobacco comment: To try gum or patch to start quiting classes    Substance and Sexual Activity    Alcohol use: Yes     Frequency: Monthly or less     Comment: rare    Drug use: Not Currently     Types: Marijuana    Sexual activity: Yes     Partners: Female     Comment: on occasion   Lifestyle    Physical activity     Days per week: Not on file     Minutes per session: Not on file    Stress: Not on file   Relationships    Social connections     Talks on phone: Not on file     Gets together: Not on file     Attends Synagogue service: Not on file     Active member of club or organization: Not on file     Attends meetings of clubs or organizations: Not on file     Relationship status: Not on file    Intimate partner violence     Fear of current or ex partner: Not on file     Emotionally abused: Not on file     Physically abused: Not on file     Forced sexual activity: Not on file   Other Topics Concern    Not on file   Social History Narrative    Not on file     }        Visual Acuity: Corrected:            L  20/40            R 20/50    Cognitive Screening:  Clock drawing test score: 5/5. Mini-mental status exam score: NA. Assessment/Plan:  Kevinrenita Lorena was seen today for medicare awv and discuss labs. 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, unspecified laterality    Primary cancer of right middle lobe of lung (HCC)    Secondary malignant neoplasm of intrathoracic lymph nodes (HCC)    Squamous cell carcinoma of right lung [facility])    Malignant neoplasm of right main bronchus (HCC)    COPD, moderate (HCC)    Gastroesophageal reflux disease without esophagitis    Chronic combined systolic and diastolic congestive heart failure (HCC)    Malignant neoplasm of lateral wall of urinary bladder [facility])    Well adult exam      Medicare Annual Wellness Visit  Name: Zander Chavis Date: 2021   MRN: [id number] Sex: Male   Age: 65 y.o. Ethnicity: Non-/Non    : 1950 Race: Nitza Perez is here for Medicare AWV and Discuss Labs    Screenings for behavioral, psychosocial and functional/safety risks, and cognitive dysfunction are all negative except as indicated below. These results, as well as other patient data from the [address] form, are documented in Flowsheets linked to this Encounter. Allergies   Allergen Reactions    Metformin And Related Diarrhea       Prior to Visit Medications    Medication Sig Taking?  Authorizing Provider   dapagliflozin (FARXIGA) 10 MG tablet Take 1 tablet by mouth every morning Yes Demetrice Lopez MD   furosemide (LASIX) 20 MG tablet TAKE 1 TABLET BY MOUTH EVERY DAY Yes NIDIA Metcalf CNP   rosuvastatin (CRESTOR) 20 MG tablet Take 1 tablet by mouth daily Yes Demetrice Lopez MD   carvedilol (COREG) 25 MG tablet TAKE 1 TABLET BY MOUTH TWICE A DAY WITH MEALS Yes NIDIA Metcalf CNP   lisinopril (PRINIVIL;ZESTRIL) 40 MG tablet TAKE 1 TABLET BY MOUTH EVERY DAY Yes Demetrice Lopez MD   insulin glargine (LANTUS) 100 UNIT/ML injection pen INJECT 20 UNITS INTO THE SKIN NIGHTLY INDICATIONS: 18-20 UNITS AT AT BEDTIME Yes Demetrice Lopez MD   omeprazole (PRILOSEC) 20 MG delayed release capsule Take 20 mg by mouth as needed  Yes Historical Provider, MD   amLODIPine (NORVASC) 10 MG tablet TAKE 1 TABLET BY MOUTH EVERY DAY Yes Demetrice Lopez MD   aspirin 81 MG tablet Take 81 mg by mouth daily Yes Historical Provider, MD       Past Medical History:   Diagnosis Date    Allergic rhinitis, cause unspecified 7/15/2010    Cancer ([facility] [address]     lung    Colon polyps     Diabetes mellitus ([facility] [address]     Essential hypertension, benign 7/15/2010    HYPERCHOLESTERAEMIA     Hypertension     Lipoma of other specified sites 7/15/2010    Other abnormal blood chemistry 7/15/2010    Other and unspecified hyperlipidemia 7/15/2010    Other symptoms involving cardiovascular system 7/15/2010    Psychosexual dysfunction with inhibited sexual excitement 7/15/2010       Past Surgical History:   Procedure Laterality Date    BRONCHOSCOPY Right 04/27/2017    Dr. Toney Dandy - w/R BI stent placement size 12x3    BRONCHOSCOPY  04/24/2017    FAVIO Aguilar - w/EBUS    BRONCHOSCOPY  09/12/2017    COLONOSCOPY  10/31/2007    polyps    COLONOSCOPY  2012    polyp- repeat 2017    COLONOSCOPY  02/2020    polyp Dr Rubin Childs repeat 2023     CYSTOSCOPY N/A 8/14/2019    CYSTOSCOPY TRANSURETHRAL RESECTION BLADDER performed by Dequan Melgar MD at [address] [place] Left 8/14/2019    OPEN LEFT INGUINAL HERNIA REPAIR WITH MESH performed by Fatimah Cloud MD at [address] Right 1975       Family History   Problem Relation Age of Onset    Breast Cancer Mother        CareTeam (Including outside providers/suppliers regularly involved in providing care):   Patient Care Team:  Mary Bolivar MD as PCP - General (Internal Medicine)  Mary Bolivar MD as PCP - [facility]  Stevphen Libman, MD as Consulting Physician (Pulmonology)  Brad Cook MD as Consulting Physician (Radiation Oncology)  Lona Barnett RN as Registered Nurse  Fatimah Cloud MD as Consulting Physician (General Surgery)  NIDIA Mcmanus CNP as Nurse Practitioner (Nurse Practitioner Acute Care)  Dequan Melgar MD as Consulting Physician (Urology)    Wt Readings from Last 3 Encounters:   01/20/21 129 lb (58.5 kg)   11/18/20 128 lb (58.1 kg)   09/14/20 127 lb 12.8 oz (58 kg)     Vitals: 01/20/21 0931   BP: 132/82   Site: Right Upper Arm   Position: Sitting   Cuff Size: Medium Adult   Pulse: 66   Resp: 14   Temp: 97.7 °F (36.5 °C)   TempSrc: Temporal   Weight: 129 lb (58.5 kg)   Height: 5' 7\" (1.702 m)     Body mass index is 20.2 kg/m². Based upon direct observation of the patient, evaluation of cognition reveals recent and remote memory intact. Patient's complete Health Risk Assessment and screening values have been reviewed and are found in Flowsheets. The following problems were reviewed today and where indicated follow up appointments were made and/or referrals ordered. Positive Risk Factor Screenings with Interventions:          General Health and ACP:  General  In general, how would you say your health is?: Good  In the past 7 days, have you experienced any of the following?  New or Increased Pain, New or Increased Fatigue, Loneliness, Social Isolation, Stress or Anger?: None of These  Do you get the social and emotional support that you need?: Yes  Do you have a Living Will?: (!) No  Advance Directives     Power of  Living Will ACP-Advance Directive ACP-Power of     Not on File Filed on 09/13/17 Filed Not on File      General Health Risk Interventions:  · info again     Hearing/Vision:  No exam data present  Hearing/Vision  Do you or your family notice any trouble with your hearing?: No  Do you have difficulty driving, watching TV, or doing any of your daily activities because of your eyesight?: No  Have you had an eye exam within the past year?: (!) No(appt on 1/25/2021 at the [place])  Hearing/Vision Interventions:  · Vision concerns:  patient encouraged to make appointment with his/her eye specialist      Personalized Preventive Plan   Current Health Maintenance Status  Immunization History   Administered Date(s) Administered    Influenza Vaccine, unspecified formulation 11/16/2015, 10/31/2016    Influenza Virus Vaccine 01/02/2013, 10/07/2013, 10/01/2014    Influenza Whole 01/02/2013, 10/07/2013, 10/03/2014    Influenza, High Dose (Fluzone 65 yrs and older) 11/16/2015, 10/31/2016, 10/10/2018    Influenza, Quadv, adjuvanted, 65 yrs +, IM, PF (Fluad) 09/14/2020    Influenza, Triv, inactivated, subunit, adjuvanted, IM (Fluad 65 yrs and older) 09/17/2019    Pneumococcal Conjugate 13-valent ([id number]) 06/29/2015    Pneumococcal Polysaccharide ([id number]) 07/29/2016    Td vaccine (adult) 01/04/2013    Tdap (Boostrix, Adacel) 11/21/2009        Health Maintenance   Topic Date Due    Hepatitis C screen  1950    Diabetic retinal exam  10/16/1958    Shingles Vaccine (1 of 2) 10/16/1998    Colon cancer screen colonoscopy  10/31/2017    Diabetic foot exam  12/12/2019    Annual Wellness Visit (AWV)  09/14/2020    A1C test (Diabetic or Prediabetic)  01/13/2022    Diabetic microalbuminuria test  01/13/2022    Lipid screen  01/13/2022    Potassium monitoring  01/13/2022    Creatinine monitoring  01/13/2022    DTaP/Tdap/Td vaccine (3 - Td) 01/04/2023    Flu vaccine  Completed    Pneumococcal 65+ yrs at Risk Vaccine  Completed    AAA screen  Completed    Hepatitis A vaccine  Aged Out    Hib vaccine  Aged Out    Meningococcal (ACWY) vaccine  Aged Out     Recommendations for [employer] Due: see orders and patient instructions/AVS.  . Recommended screening schedule for the next 5-10 years is provided to the patient in written form: see Patient Clair Lockwood was seen today for medicare awv and discuss labs. 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

## 2021-01-20 NOTE — ASSESSMENT & PLAN NOTE
Within normal limits for age-retired  no ADL issues,immunizations up to date, no depression ,no cognitive impairment  Colonoscopy up to date polyp 2/20 repeat 2023   Eye exam up to date  Exercises as tolerated    No living will but does not want resuscitation info given    Findings and recommendations discussed with Pt

## 2021-01-20 NOTE — PATIENT INSTRUCTIONS
Personalized Preventive Plan for Miranda Villarreal - 1/20/2021  Medicare offers a range of preventive health benefits. Some of the tests and screenings are paid in full while other may be subject to a deductible, co-insurance, and/or copay. Some of these benefits include a comprehensive review of your medical history including lifestyle, illnesses that may run in your family, and various assessments and screenings as appropriate. After reviewing your medical record and screening and assessments performed today your provider may have ordered immunizations, labs, imaging, and/or referrals for you. A list of these orders (if applicable) as well as your Preventive Care list are included within your After Visit Summary for your review. Other Preventive Recommendations:    · A preventive eye exam performed by an eye specialist is recommended every 1-2 years to screen for glaucoma; cataracts, macular degeneration, and other eye disorders. · A preventive dental visit is recommended every 6 months. · Try to get at least 150 minutes of exercise per week or 10,000 steps per day on a pedometer . · Order or download the FREE \"Exercise & Physical Activity: Your Everyday Guide\" from [employer]. Call 9-172.156.1708 or search [employer] online. · You need 3944-2019 mg of calcium and 5692-2410 IU of vitamin D per day. It is possible to meet your calcium requirement with diet alone, but a vitamin D supplement is usually necessary to meet this goal.  · When exposed to the sun, use a sunscreen that protects against both UVA and UVB radiation with an SPF of 30 or greater. Reapply every 2 to 3 hours or after sweating, drying off with a towel, or swimming. · Always wear a seat belt when traveling in a car. Always wear a helmet when riding a bicycle or motorcycle.

## 2021-01-20 NOTE — ASSESSMENT & PLAN NOTE
HgA1C slightly increase as noted will increase Farxiga to 10 mg and cont Lantis as noted ,Repeat labs in 3 mo.

## 2021-01-25 RX ORDER — INSULIN GLARGINE 100 [IU]/ML
INJECTION, SOLUTION SUBCUTANEOUS
Qty: 5 PEN | Refills: 4 | Status: SHIPPED | OUTPATIENT
Start: 2021-01-25 | End: 2021-04-20 | Stop reason: DRUGHIGH

## 2021-02-19 PROBLEM — Z00.00 WELL ADULT EXAM: Status: RESOLVED | Noted: 2017-05-18 | Resolved: 2021-02-19

## 2021-03-15 RX ORDER — CARVEDILOL 25 MG/1
TABLET ORAL
Qty: 180 TABLET | Refills: 1 | Status: SHIPPED | OUTPATIENT
Start: 2021-03-15 | End: 2021-09-15

## 2021-03-19 DIAGNOSIS — R06.2 WHEEZING: ICD-10-CM

## 2021-03-19 RX ORDER — ALBUTEROL SULFATE 90 UG/1
AEROSOL, METERED RESPIRATORY (INHALATION)
Qty: 18 INHALER | Refills: 3 | Status: SHIPPED | OUTPATIENT
Start: 2021-03-19 | End: 2022-01-19 | Stop reason: SDUPTHER

## 2021-03-24 RX ORDER — ROSUVASTATIN CALCIUM 20 MG/1
TABLET, COATED ORAL
Qty: 90 TABLET | Refills: 1 | Status: SHIPPED | OUTPATIENT
Start: 2021-03-24 | End: 2021-12-23 | Stop reason: SDUPTHER

## 2021-04-16 ENCOUNTER — HOSPITAL ENCOUNTER (OUTPATIENT)
Age: 71
Discharge: HOME | End: 2021-04-16
Payer: MEDICARE

## 2021-04-16 DIAGNOSIS — J30.2 SEASONAL ALLERGIC RHINITIS, UNSPECIFIED TRIGGER: Chronic | ICD-10-CM

## 2021-04-16 DIAGNOSIS — Z79.4 TYPE 2 DIABETES MELLITUS WITHOUT COMPLICATION, WITH LONG-TERM CURRENT USE OF INSULIN (HCC): ICD-10-CM

## 2021-04-16 DIAGNOSIS — C34.91 SQUAMOUS CELL CARCINOMA OF RIGHT LUNG (HCC): ICD-10-CM

## 2021-04-16 DIAGNOSIS — C77.1 SECONDARY MALIGNANT NEOPLASM OF INTRATHORACIC LYMPH NODES (HCC): ICD-10-CM

## 2021-04-16 DIAGNOSIS — J44.9 COPD, MODERATE (HCC): ICD-10-CM

## 2021-04-16 DIAGNOSIS — D17.79 LIPOMA OF OTHER SPECIFIED SITES: ICD-10-CM

## 2021-04-16 DIAGNOSIS — I50.42 CHRONIC COMBINED SYSTOLIC AND DIASTOLIC CONGESTIVE HEART FAILURE (HCC): ICD-10-CM

## 2021-04-16 DIAGNOSIS — I10 ESSENTIAL HYPERTENSION: ICD-10-CM

## 2021-04-16 DIAGNOSIS — C34.2 PRIMARY CANCER OF RIGHT MIDDLE LOBE OF LUNG (HCC): ICD-10-CM

## 2021-04-16 DIAGNOSIS — E11.9 TYPE 2 DIABETES MELLITUS WITHOUT COMPLICATION, WITH LONG-TERM CURRENT USE OF INSULIN (HCC): ICD-10-CM

## 2021-04-16 DIAGNOSIS — M1A.0490 IDIOPATHIC CHRONIC GOUT OF HAND WITHOUT TOPHUS, UNSPECIFIED LATERALITY: ICD-10-CM

## 2021-04-16 DIAGNOSIS — C67.2 MALIGNANT NEOPLASM OF LATERAL WALL OF URINARY BLADDER (HCC): ICD-10-CM

## 2021-04-16 DIAGNOSIS — E78.2 MIXED HYPERLIPIDEMIA: ICD-10-CM

## 2021-04-16 DIAGNOSIS — G25.81 RLS (RESTLESS LEGS SYNDROME): ICD-10-CM

## 2021-04-16 DIAGNOSIS — E55.9 VITAMIN D DEFICIENCY: ICD-10-CM

## 2021-04-16 DIAGNOSIS — K21.9 GASTROESOPHAGEAL REFLUX DISEASE WITHOUT ESOPHAGITIS: ICD-10-CM

## 2021-04-16 DIAGNOSIS — Z00.00 WELL ADULT EXAM: ICD-10-CM

## 2021-04-16 DIAGNOSIS — Z00.00 ROUTINE GENERAL MEDICAL EXAMINATION AT A HEALTH CARE FACILITY: ICD-10-CM

## 2021-04-16 DIAGNOSIS — C34.01 MALIGNANT NEOPLASM OF RIGHT MAIN BRONCHUS (HCC): ICD-10-CM

## 2021-04-16 LAB
A/G RATIO: 1.3 (ref 1.1–2.2)
ALBUMIN SERPL-MCNC: 4.5 G/DL (ref 3.4–5)
ALP BLD-CCNC: 153 U/L (ref 40–129)
ALT SERPL-CCNC: 37 U/L (ref 10–40)
ANION GAP SERPL CALCULATED.3IONS-SCNC: 9 MMOL/L (ref 3–16)
AST SERPL-CCNC: 29 U/L (ref 15–37)
BILIRUB SERPL-MCNC: 0.3 MG/DL (ref 0–1)
BUN BLDV-MCNC: 19 MG/DL (ref 7–20)
CALCIUM SERPL-MCNC: 9.1 MG/DL (ref 8.3–10.6)
CHLORIDE BLD-SCNC: 102 MMOL/L (ref 99–110)
CHOLESTEROL, TOTAL: 173 MG/DL (ref 0–199)
CO2: 26 MMOL/L (ref 21–32)
CREAT SERPL-MCNC: 1 MG/DL (ref 0.8–1.3)
GFR AFRICAN AMERICAN: >60
GFR NON-AFRICAN AMERICAN: >60
GLOBULIN: 3.5 G/DL
GLUCOSE BLD-MCNC: 98 MG/DL (ref 70–99)
HDLC SERPL-MCNC: 37 MG/DL (ref 40–60)
LDL CHOLESTEROL CALCULATED: 101 MG/DL
POTASSIUM SERPL-SCNC: 5 MMOL/L (ref 3.5–5.1)
REASON FOR REJECTION: NORMAL
REJECTED TEST: NORMAL
SODIUM BLD-SCNC: 137 MMOL/L (ref 136–145)
TOTAL PROTEIN: 8 G/DL (ref 6.4–8.2)
TRIGL SERPL-MCNC: 176 MG/DL (ref 0–150)
TSH REFLEX: 1.61 UIU/ML (ref 0.27–4.2)
VLDLC SERPL CALC-MCNC: 35 MG/DL

## 2021-04-16 PROCEDURE — 36415 COLL VENOUS BLD VENIPUNCTURE: CPT

## 2021-04-16 PROCEDURE — 83036 HEMOGLOBIN GLYCOSYLATED A1C: CPT

## 2021-04-16 PROCEDURE — 84443 ASSAY THYROID STIM HORMONE: CPT

## 2021-04-16 PROCEDURE — 80053 COMPREHEN METABOLIC PANEL: CPT

## 2021-04-16 PROCEDURE — 80061 LIPID PANEL: CPT

## 2021-04-17 LAB
ESTIMATED AVERAGE GLUCOSE: 211.6 MG/DL
HBA1C MFR BLD: 9 %

## 2021-04-20 ENCOUNTER — OFFICE VISIT (OUTPATIENT)
Dept: INTERNAL MEDICINE CLINIC | Age: 71
End: 2021-04-20
Payer: MEDICARE

## 2021-04-20 VITALS
DIASTOLIC BLOOD PRESSURE: 86 MMHG | WEIGHT: 127.2 LBS | BODY MASS INDEX: 19.97 KG/M2 | SYSTOLIC BLOOD PRESSURE: 124 MMHG | HEART RATE: 78 BPM | HEIGHT: 67 IN | RESPIRATION RATE: 14 BRPM

## 2021-04-20 DIAGNOSIS — M1A.0490 IDIOPATHIC CHRONIC GOUT OF HAND WITHOUT TOPHUS, UNSPECIFIED LATERALITY: ICD-10-CM

## 2021-04-20 DIAGNOSIS — J44.9 COPD, MODERATE (HCC): ICD-10-CM

## 2021-04-20 DIAGNOSIS — E78.2 MIXED HYPERLIPIDEMIA: ICD-10-CM

## 2021-04-20 DIAGNOSIS — J30.2 SEASONAL ALLERGIC RHINITIS, UNSPECIFIED TRIGGER: Chronic | ICD-10-CM

## 2021-04-20 DIAGNOSIS — C67.2 MALIGNANT NEOPLASM OF LATERAL WALL OF URINARY BLADDER (HCC): ICD-10-CM

## 2021-04-20 DIAGNOSIS — E55.9 VITAMIN D DEFICIENCY: ICD-10-CM

## 2021-04-20 DIAGNOSIS — I10 ESSENTIAL HYPERTENSION: ICD-10-CM

## 2021-04-20 DIAGNOSIS — E11.9 TYPE 2 DIABETES MELLITUS WITHOUT COMPLICATION, WITH LONG-TERM CURRENT USE OF INSULIN (HCC): ICD-10-CM

## 2021-04-20 DIAGNOSIS — K21.9 GASTROESOPHAGEAL REFLUX DISEASE WITHOUT ESOPHAGITIS: ICD-10-CM

## 2021-04-20 DIAGNOSIS — I42.0 DILATED CARDIOMYOPATHY (HCC): ICD-10-CM

## 2021-04-20 DIAGNOSIS — I50.42 CHRONIC COMBINED SYSTOLIC AND DIASTOLIC CONGESTIVE HEART FAILURE (HCC): ICD-10-CM

## 2021-04-20 DIAGNOSIS — C34.01 MALIGNANT NEOPLASM OF RIGHT MAIN BRONCHUS (HCC): ICD-10-CM

## 2021-04-20 DIAGNOSIS — Z79.4 TYPE 2 DIABETES MELLITUS WITHOUT COMPLICATION, WITH LONG-TERM CURRENT USE OF INSULIN (HCC): ICD-10-CM

## 2021-04-20 DIAGNOSIS — C34.2 PRIMARY CANCER OF RIGHT MIDDLE LOBE OF LUNG (HCC): ICD-10-CM

## 2021-04-20 PROCEDURE — 3052F HG A1C>EQUAL 8.0%<EQUAL 9.0%: CPT | Performed by: INTERNAL MEDICINE

## 2021-04-20 PROCEDURE — 99214 OFFICE O/P EST MOD 30 MIN: CPT | Performed by: INTERNAL MEDICINE

## 2021-04-20 RX ORDER — INSULIN GLARGINE 100 [IU]/ML
22 INJECTION, SOLUTION SUBCUTANEOUS NIGHTLY
COMMUNITY
End: 2021-05-20 | Stop reason: SDUPTHER

## 2021-04-20 ASSESSMENT — ENCOUNTER SYMPTOMS
EYES NEGATIVE: 1
RESPIRATORY NEGATIVE: 1
RHINORRHEA: 1
GASTROINTESTINAL NEGATIVE: 1
ALLERGIC/IMMUNOLOGIC NEGATIVE: 1

## 2021-04-20 ASSESSMENT — PATIENT HEALTH QUESTIONNAIRE - PHQ9
SUM OF ALL RESPONSES TO PHQ QUESTIONS 1-9: 0
SUM OF ALL RESPONSES TO PHQ QUESTIONS 1-9: 0
1. LITTLE INTEREST OR PLEASURE IN DOING THINGS: 0
2. FEELING DOWN, DEPRESSED OR HOPELESS: 0
SUM OF ALL RESPONSES TO PHQ9 QUESTIONS 1 & 2: 0

## 2021-04-20 NOTE — PROGRESS NOTES
Subjective:      Patient ID: Billie Hagen is a 65 y.o. male. HPI  Here today for follow up of chronic problems as per HPI and as problems listed under assessment and plan,no new c/o feels good is to get COVID vaccination  At the [facility]     Diabetes  He presents for his follow-up diabetic visit. He has type 2 diabetes mellitus. His disease course has been fluctuating. There are no hypoglycemic associated symptoms. Pertinent negatives for hypoglycemia include no headaches. Pertinent negatives for diabetes include no chest pain, no foot paresthesias and no polyphagia. Symptoms are stable. Diabetic complications include heart disease. Risk factors for coronary artery disease include diabetes mellitus, dyslipidemia and hypertension. Current diabetic treatment includes diet, insulin injections and oral agent (monotherapy). He is compliant with treatment all of the time. His weight is fluctuating minimally. He is following a diabetic, low fat/cholesterol and low salt diet. Meal planning includes avoidance of concentrated sweets. He participates in exercise three times a week. His home blood glucose trend is fluctuating minimally. His breakfast blood glucose is taken between 7-8 am. His breakfast blood glucose range is generally 130-140 mg/dl. An ACE inhibitor/angiotensin II receptor blocker is being taken. He does not see a podiatrist.Eye exam is current. Hypertension  This is a chronic problem. The current episode started more than 1 year ago. The problem has been waxing and waning since onset. The problem is controlled. Pertinent negatives include no chest pain, headaches or palpitations. Risk factors for coronary artery disease include diabetes mellitus, male gender and dyslipidemia. Past treatments include ACE inhibitors, calcium channel blockers, diuretics, beta blockers and lifestyle changes. The current treatment provides significant improvement. There are no compliance problems.   Hypertensive end-organ damage includes heart failure. Hyperlipidemia  This is a chronic problem. The current episode started more than 1 year ago. The problem is controlled. Recent lipid tests were reviewed and are low. Exacerbating diseases include diabetes. There are no known factors aggravating his hyperlipidemia. Pertinent negatives include no chest pain. Current antihyperlipidemic treatment includes diet change, exercise and statins. The current treatment provides significant improvement of lipids. There are no compliance problems. Risk factors for coronary artery disease include dyslipidemia, diabetes mellitus and hypertension. Allergies   Allergen Reactions    Metformin And Related Diarrhea       Current Outpatient Medications   Medication Sig Dispense Refill    insulin glargine (LANTUS SOLOSTAR) 100 UNIT/ML injection pen Inject 22 Units into the skin nightly      rosuvastatin (CRESTOR) 20 MG tablet TAKE 1 TABLET BY MOUTH EVERY DAY 90 tablet 1    albuterol sulfate  (90 Base) MCG/ACT inhaler TAKE 2 PUFFS BY MOUTH EVERY 6 HOURS AS NEEDED FOR WHEEZE 18 Inhaler 3    carvedilol (COREG) 25 MG tablet TAKE 1 TABLET BY MOUTH TWICE A DAY WITH MEALS 180 tablet 1    dapagliflozin (FARXIGA) 10 MG tablet Take 1 tablet by mouth every morning 90 tablet 3    furosemide (LASIX) 20 MG tablet TAKE 1 TABLET BY MOUTH EVERY DAY 90 tablet 1    lisinopril (PRINIVIL;ZESTRIL) 40 MG tablet TAKE 1 TABLET BY MOUTH EVERY DAY 90 tablet 2    omeprazole (PRILOSEC) 20 MG delayed release capsule Take 20 mg by mouth as needed       amLODIPine (NORVASC) 10 MG tablet TAKE 1 TABLET BY MOUTH EVERY DAY 90 tablet 0    aspirin 81 MG tablet Take 81 mg by mouth daily       No current facility-administered medications for this visit.         Past Medical History:   Diagnosis Date    Allergic rhinitis, cause unspecified 7/15/2010    Cancer ([facility] [address]     lung    Colon polyps     Diabetes mellitus ([facility] [address]     Essential hypertension, benign 7/15/2010    HYPERCHOLESTERAEMIA     Hypertension     Lipoma of other specified sites 7/15/2010    Other abnormal blood chemistry 7/15/2010    Other and unspecified hyperlipidemia 7/15/2010    Other symptoms involving cardiovascular system 7/15/2010    Psychosexual dysfunction with inhibited sexual excitement 7/15/2010       Family History   Problem Relation Age of Onset    Breast Cancer Mother        Past Surgical History:   Procedure Laterality Date    BRONCHOSCOPY Right 04/27/2017    Dr. Trinidad Laboy - w/R BI stent placement size 12x3    BRONCHOSCOPY  04/24/2017    FAVIO Aguilar - w/EBUS    BRONCHOSCOPY  09/12/2017    COLONOSCOPY  10/31/2007    polyps    COLONOSCOPY  2012    polyp- repeat 2017    COLONOSCOPY  02/2020    polyp Dr Butler Goes repeat 2023     COLONOSCOPY  02/06/2020    Dr. Tasia Caal, polyps removed, repeat 1/2023    CYSTOSCOPY N/A 08/14/2019    CYSTOSCOPY TRANSURETHRAL RESECTION BLADDER performed by Ke Klein MD at [place] [id number] Left 08/14/2019    OPEN LEFT INGUINAL HERNIA REPAIR WITH MESH performed by Dedrick Demarco MD at [address] [place] [address] Right 1975       Social History     Socioeconomic History    Marital status:      Spouse name: Not on file    Number of children: Not on file    Years of education: Not on file    Highest education level: Not on file   Occupational History    Not on file   Social Needs    Financial resource strain: Not on file    Food insecurity     Worry: Not on file     Inability: Not on file    Transportation needs     Medical: Not on file     Non-medical: Not on file   Tobacco Use    Smoking status: Former Smoker     Packs/day: 0.50     Years: 40.00     Pack years: 20.00     Types: Cigarettes     Start date: 7/29/1965     Quit date: 4/23/2017     Years since quitting: 3.9    Smokeless tobacco: Never Used    Tobacco comment: To try gum or patch to start quiting classes    Substance and Sexual Activity    Alcohol use:  Yes Frequency: Monthly or less     Comment: rare    Drug use: Not Currently     Types: Marijuana    Sexual activity: Yes     Partners: Female     Comment: on occasion   Lifestyle    Physical activity     Days per week: Not on file     Minutes per session: Not on file    Stress: Not on file   Relationships    Social connections     Talks on phone: Not on file     Gets together: Not on file     Attends Mosque service: Not on file     Active member of club or organization: Not on file     Attends meetings of clubs or organizations: Not on file     Relationship status: Not on file    Intimate partner violence     Fear of current or ex partner: Not on file     Emotionally abused: Not on file     Physically abused: Not on file     Forced sexual activity: Not on file   Other Topics Concern    Not on file   Social History Narrative    Not on file       Review of Systems  Review of Systems   Constitutional: Positive for unexpected weight change (up a few # ). HENT: Positive for congestion, postnasal drip and rhinorrhea. Eyes: Negative. Reading glasses   Respiratory: Negative. S/P CA of Lung as noted cont F/U with Dr Daryle Legato etc    Cardiovascular: Negative. Negative for chest pain and palpitations. Gastrointestinal: Negative. Needs repeat colonoscopy for polyp  Was to be done 2017 but not done 2nd to lung CA -r repeat 2/20 - polyps repeat 2023  per Dr Nichols Him   S/P L inguinal hernia 2019   Endocrine: Negative. Negative for polyphagia. Genitourinary: Positive for frequency. Nocturia  Needs PSA in future- had recent Cysto for prostate nodule Bx was negative done at [facility] seeing Dr Paul Salgado for bladder CA see reports    Musculoskeletal: Negative. Back pain: improved         Occ RLS symptoms    Skin: Negative. Allergic/Immunologic: Negative. Neurological: Negative. Negative for numbness and headaches. Hematological: Negative. Psychiatric/Behavioral: Negative. qwith cardiology     Essential hypertension   Well-controlled, continue current treatment plan, lifestyle modifications recommended and cont to check BP at home     Gastroesophageal reflux disease without esophagitis   Cont.  With diet and meds     Gout  No recent symptoms cont diet and meds as noted     Lung cancer, main bronchus ([address]   Now 4 years post Tx has remained disease free as noted cont routine F/U    Malignant neoplasm of lateral wall of urinary bladder ([address]  Remains stable last cysto was ok     Mixed hyperlipidemia   Well-controlled, continue current treatment plan and lifestyle modifications recommended    Primary cancer of right middle lobe of lung ([address]   Remains disease free see above and notes from Dr Chante Narvaez     Type 2 diabetes mellitus without complication, with long-term current use of insulin ([address]   Borderline controlled, changes made today: cont current meds but will lower AM target FBS to 100 from 100 and cont to adjust Lantis     Vitamin D deficiency  Continue current meds

## 2021-04-21 ENCOUNTER — HOSPITAL ENCOUNTER (OUTPATIENT)
Age: 71
Discharge: HOME | End: 2021-04-21
Payer: MEDICARE

## 2021-04-21 LAB
BASOPHILS ABSOLUTE: 0.1 K/UL (ref 0–0.2)
BASOPHILS RELATIVE PERCENT: 0.8 %
EOSINOPHILS ABSOLUTE: 0.1 K/UL (ref 0–0.6)
EOSINOPHILS RELATIVE PERCENT: 2 %
HCT VFR BLD CALC: 41.6 % (ref 40.5–52.5)
HEMOGLOBIN: 13.9 G/DL (ref 13.5–17.5)
LYMPHOCYTES ABSOLUTE: 1.4 K/UL (ref 1–5.1)
LYMPHOCYTES RELATIVE PERCENT: 21.2 %
MCH RBC QN AUTO: 30.1 PG (ref 26–34)
MCHC RBC AUTO-ENTMCNC: 33.4 G/DL (ref 31–36)
MCV RBC AUTO: 90.2 FL (ref 80–100)
MONOCYTES ABSOLUTE: 0.5 K/UL (ref 0–1.3)
MONOCYTES RELATIVE PERCENT: 8 %
NEUTROPHILS ABSOLUTE: 4.4 K/UL (ref 1.7–7.7)
NEUTROPHILS RELATIVE PERCENT: 68 %
PDW BLD-RTO: 14.1 % (ref 12.4–15.4)
PLATELET # BLD: 206 K/UL (ref 135–450)
PMV BLD AUTO: 8.5 FL (ref 5–10.5)
RBC # BLD: 4.61 M/UL (ref 4.2–5.9)
WBC # BLD: 6.5 K/UL (ref 4–11)

## 2021-04-21 PROCEDURE — 36415 COLL VENOUS BLD VENIPUNCTURE: CPT

## 2021-04-21 PROCEDURE — 85025 COMPLETE CBC W/AUTO DIFF WBC: CPT

## 2021-05-17 ASSESSMENT — ENCOUNTER SYMPTOMS
GASTROINTESTINAL NEGATIVE: 1
RESPIRATORY NEGATIVE: 1

## 2021-05-17 NOTE — PROGRESS NOTES
[facility]   Congestive Heart Failure    PrimaryCare Doctor:  Deneen Zamorano MD      Chief Complaint:  CHF    History of Present Illness:  Xiomara Merrill is a 65 y.o. male with PMH COPD, lung cancer, HTN, DM, HLD, HFmrEF (40-45%) who presents today for CHF f/u. OV 6months ago: no med changes, echo ordered  Today he states that he feels great, very active, still abl to cut his grass with a push mower and he denies chest pain, dyspnea, palpitations, orthopnea, PND, exertional chest pressure/discomfort, fatigue, early saiety, edema, syncope. BP elevated today, at home 135/78, and was ok at recent PCP visit. He had echo today which is pending. Wt stable    ER Visit: No  Recent Hospitalization: No    Baseline Weight: 131-132lb    Baseline BNP:<500    EF: 40-45%  Cardiac Imaging:  Echo 12/18/2018:  Summary   -Technically difficult examination. Lung Cancer.   -Normal left ventricular cavity size and wall thickness.   -Moderately reduced systolic function with an ejection fraction of 40-45%.  -No obvious regional wall motion abnormalities noted.   -Normal diastolic function.   - Mild mitral regurgitation.   -There is sclerotic nodular thickening of the aortic valve cusps.  -Thickening on the tricuspid annulus.   -Trivial tricuspid regurgitation with a RVSP of 29 mmHg.     Echo 8/8/2018:   Summary   -This is a limited exam to evaluate aortic valve. Patient had a complete   exam 4/12/2018   -Normal global systolic function with an ejection fraction of 55%.    -No obvious regional wall motion abnormalities noted.   -There is sclerotic nodular thickening of the aortic valve cusps without evidence of significant stenosis or regurgitation.     MPI 4/12/18:  Summary   No EKG evidence for ischemia with exercise   Normal LV size and systolic function.   There is normal isotope uptake at stress and rest. There is no evidence of  myocardial ischemia or scar.      Device: No   ICD counseling:No     Activity: at albuterol sulfate  (90 Base) MCG/ACT inhaler TAKE 2 PUFFS BY MOUTH EVERY 6 HOURS AS NEEDED FOR WHEEZE 3/19/21   Arlyn Molina MD   carvedilol (COREG) 25 MG tablet TAKE 1 TABLET BY MOUTH TWICE A DAY WITH MEALS 3/15/21   NIDIA Garcia CNP   dapagliflozin (FARXIGA) 10 MG tablet Take 1 tablet by mouth every morning 1/20/21 1/15/22  Penny Ramirez MD   furosemide (LASIX) 20 MG tablet TAKE 1 TABLET BY MOUTH EVERY DAY 12/29/20   NIDIA Garcia CNP   lisinopril (PRINIVIL;ZESTRIL) 40 MG tablet TAKE 1 TABLET BY MOUTH EVERY DAY 8/31/20   Penny Ramirez MD   omeprazole (PRILOSEC) 20 MG delayed release capsule Take 20 mg by mouth as needed     Historical Provider, MD   amLODIPine (NORVASC) 10 MG tablet TAKE 1 TABLET BY MOUTH EVERY DAY 11/17/17   Penny Ramirez MD   aspirin 81 MG tablet Take 81 mg by mouth daily    Historical Provider, MD        Allergies:  Metformin and related     ROS:   Review of Systems   Constitutional: Negative. Respiratory: Negative. Cardiovascular: Negative. Gastrointestinal: Negative. Genitourinary: Negative. Musculoskeletal: Negative. Skin: Negative. Neurological: Negative. Hematological: Negative. Psychiatric/Behavioral: Negative. Physical Examination:    Vitals:    05/18/21 1447   BP: (!) 150/110   Site: Left Upper Arm   Position: Sitting   Cuff Size: Medium Adult   Pulse: 68   SpO2: 99%   Weight: 128 lb (58.1 kg)   Height: 5' 7\" (1.702 m)           Physical Exam  Constitutional:       Appearance: He is well-developed. HENT:      Head: Normocephalic and atraumatic. Eyes:      Pupils: Pupils are equal, round, and reactive to light. Cardiovascular:      Rate and Rhythm: Normal rate and regular rhythm. Pulses: Normal pulses. Heart sounds: Normal heart sounds. Pulmonary:      Effort: Pulmonary effort is normal.      Breath sounds: Normal breath sounds. Abdominal:      Palpations: Abdomen is soft.    Musculoskeletal: General: Normal range of motion. Cervical back: Normal range of motion and neck supple. Skin:     General: Skin is warm and dry. Neurological:      General: No focal deficit present. Mental Status: He is alert and oriented to person, place, and time. Mental status is at baseline. Psychiatric:         Behavior: Behavior normal.         Thought Content:  Thought content normal.         Judgment: Judgment normal.         Lab Data:    CBC:   Lab Results   Component Value Date    WBC 6.5 04/21/2021    WBC 7.0 01/13/2021    WBC 8.0 08/31/2020    RBC 4.61 04/21/2021    RBC 4.49 01/13/2021    RBC 4.29 08/31/2020    RBC 4.24 06/29/2017    RBC 4.04 06/22/2017    RBC 4.43 06/15/2017    HGB 13.9 04/21/2021    HGB 13.5 01/13/2021    HGB 12.9 08/31/2020    HCT 41.6 04/21/2021    HCT 39.7 01/13/2021    HCT 38.9 08/31/2020    MCV 90.2 04/21/2021    MCV 88.3 01/13/2021    MCV 90.6 08/31/2020    RDW 14.1 04/21/2021    RDW 13.5 01/13/2021    RDW 13.6 08/31/2020     04/21/2021     01/13/2021     08/31/2020     BMP:  Lab Results   Component Value Date     04/16/2021     01/13/2021     08/31/2020    K 5.0 04/16/2021    K 4.1 01/13/2021    K 3.8 08/31/2020    K 3.9 12/19/2018    K 3.9 04/13/2018     04/16/2021     01/13/2021     08/31/2020    CO2 26 04/16/2021    CO2 24 01/13/2021    CO2 25 08/31/2020    PHOS 2.5 05/04/2017    BUN 19 04/16/2021    BUN 15 01/13/2021    BUN 16 08/31/2020    CREATININE 1.0 04/16/2021    CREATININE 0.9 01/13/2021    CREATININE 1.0 08/31/2020     BNP:   Lab Results   Component Value Date    PROBNP 149 05/21/2020    PROBNP 205 09/11/2019    PROBNP 575 03/13/2019     Iron Studies:  No components found for: FE,  TIBC,  FERRITIN  Iron Deficiency Anemia:  No    IV Iron Therapy:  No  2017 ACC/AHA HF Guidelines:   intravenous iron replacement in patients with New York Heart Association (NYHA) class II and III HF and iron deficiency (ferritin <100 ng/ml or 100-300 ng/ml if transferrin saturation <20%), to improve functional status and QoL. Assessment/Plan:    Encounter Diagnoses   Name     Dilated cardiomyopathy ([facility] On GDT    Essential hypertension Elevated today, monitor at home, bring home cuff to compare with ours    Chronic combined systolic and diastolic congestive heart failure ([facility] [address] Compensated, labs done per PCP              Instructions:   1. Medications:continue current medications, bring your BP cuff with you next visit so we can check   2. Lifestyle Recommendations: Weigh yourself every day in the morning after urination, call [facility] if wt increases 2-3lb in one day or 5lb in one week, Limit sodium to 2000mg/day and fluids to 2L or 64oz/day. 3. Follow up: 6 months         [facility]: [phone]       I appreciate the opportunity of cooperating in the care of this individual.    Anna Marie Stack, NIDIA - CNP, CNP, 5/17/2021,4:24 PM    QUALITY MEASURES  1. Tobacco Cessation Counseling: NA  2. Retake of BP if >140/90:   NA  3. Documentation to PCP/referring for new patient:  Sent to PCP at close of office visit  4. CAD patient on anti-platelet: NA  5. CAD patient on STATIN therapy:  NA  6. Patient with CHF and aFib on anticoagulation:  NA   7. Patient Education:NA   8. BB for LVSD :  Yes   9. ACE/ARB for LVSD:  Yes   10.  Left Ventricular Ejection Fraction (LVEF) Assessment:  Yes

## 2021-05-18 ENCOUNTER — TELEPHONE (OUTPATIENT)
Dept: CARDIOLOGY CLINIC | Age: 71
End: 2021-05-18

## 2021-05-18 ENCOUNTER — OFFICE VISIT (OUTPATIENT)
Dept: CARDIOLOGY CLINIC | Age: 71
End: 2021-05-18
Payer: MEDICARE

## 2021-05-18 ENCOUNTER — HOSPITAL ENCOUNTER (OUTPATIENT)
Dept: NON INVASIVE DIAGNOSTICS | Age: 71
Discharge: HOME | End: 2021-05-18
Payer: MEDICARE

## 2021-05-18 VITALS
BODY MASS INDEX: 20.09 KG/M2 | DIASTOLIC BLOOD PRESSURE: 110 MMHG | OXYGEN SATURATION: 99 % | WEIGHT: 128 LBS | HEIGHT: 67 IN | HEART RATE: 68 BPM | SYSTOLIC BLOOD PRESSURE: 150 MMHG

## 2021-05-18 DIAGNOSIS — I50.42 CHRONIC COMBINED SYSTOLIC AND DIASTOLIC CONGESTIVE HEART FAILURE (HCC): ICD-10-CM

## 2021-05-18 DIAGNOSIS — I10 ESSENTIAL HYPERTENSION: ICD-10-CM

## 2021-05-18 DIAGNOSIS — I42.0 DILATED CARDIOMYOPATHY (HCC): Primary | ICD-10-CM

## 2021-05-18 DIAGNOSIS — I50.22 CHRONIC SYSTOLIC CONGESTIVE HEART FAILURE (HCC): ICD-10-CM

## 2021-05-18 PROCEDURE — 99213 OFFICE O/P EST LOW 20 MIN: CPT | Performed by: NURSE PRACTITIONER

## 2021-05-18 PROCEDURE — 93306 TTE W/DOPPLER COMPLETE: CPT

## 2021-05-18 PROCEDURE — 93308 TTE F-UP OR LMTD: CPT

## 2021-05-18 NOTE — PATIENT INSTRUCTIONS
Instructions:   1. Medications:continue current medications, bring your BP cuff with you next visit so we can check   2. Lifestyle Recommendations: Weigh yourself every day in the morning after urination, call Ilia Hurley if wt increases 2-3lb in one day or 5lb in one week, Limit sodium to 2000mg/day and fluids to 2L or 64oz/day.     3. Follow up: 6 months         [facility]: [phone]

## 2021-05-18 NOTE — TELEPHONE ENCOUNTER
----- Message from NIDIA Mcdonnell CNP sent at 5/18/2021  4:10 PM EDT -----  Echo looks good, heart function is improved, no change in meds.  [facility]

## 2021-05-20 RX ORDER — INSULIN GLARGINE 100 [IU]/ML
22 INJECTION, SOLUTION SUBCUTANEOUS NIGHTLY
Qty: 5 PEN | Refills: 3 | Status: SHIPPED | OUTPATIENT
Start: 2021-05-20

## 2021-05-25 RX ORDER — FUROSEMIDE 20 MG/1
TABLET ORAL
Qty: 90 TABLET | Refills: 1 | Status: SHIPPED | OUTPATIENT
Start: 2021-05-25 | End: 2022-03-24

## 2021-05-25 RX ORDER — LISINOPRIL 40 MG/1
TABLET ORAL
Qty: 90 TABLET | Refills: 2 | Status: SHIPPED | OUTPATIENT
Start: 2021-05-25 | End: 2022-04-12

## 2021-05-25 NOTE — TELEPHONE ENCOUNTER
Prescription refill    Last OV:5-18-21    Last Refill:12-29-20    Labs:5-21-20 BNP    Future Appt:11-18-21

## 2021-07-10 ENCOUNTER — HOSPITAL ENCOUNTER (OUTPATIENT)
Dept: CT IMAGING | Age: 71
Discharge: HOME | End: 2021-07-10
Payer: MEDICARE

## 2021-07-10 ENCOUNTER — HOSPITAL ENCOUNTER (OUTPATIENT)
Dept: GENERAL RADIOLOGY | Age: 71
Discharge: HOME | End: 2021-07-10
Payer: MEDICARE

## 2021-07-10 ENCOUNTER — HOSPITAL ENCOUNTER (OUTPATIENT)
Age: 71
Discharge: HOME | End: 2021-07-10
Payer: MEDICARE

## 2021-07-10 DIAGNOSIS — K40.90 UNILATERAL INGUINAL HERNIA WITHOUT OBSTRUCTION OR GANGRENE, RECURRENCE NOT SPECIFIED: ICD-10-CM

## 2021-07-10 DIAGNOSIS — D41.4 NEOPLASM OF UNCERTAIN BEHAVIOR OF BLADDER: ICD-10-CM

## 2021-07-10 DIAGNOSIS — N39.0 URINARY TRACT INFECTION WITHOUT HEMATURIA, SITE UNSPECIFIED: ICD-10-CM

## 2021-07-10 DIAGNOSIS — Z85.118 PERSONAL HISTORY OF MALIGNANT NEOPLASM OF BRONCHUS AND LUNG: ICD-10-CM

## 2021-07-10 DIAGNOSIS — Z72.0 TOBACCO ABUSE: ICD-10-CM

## 2021-07-10 DIAGNOSIS — C67.2 MALIGNANT NEOPLASM OF LATERAL WALL OF URINARY BLADDER (HCC): ICD-10-CM

## 2021-07-10 DIAGNOSIS — R31.21 ASYMPTOMATIC MICROSCOPIC HEMATURIA: ICD-10-CM

## 2021-07-10 PROCEDURE — 6360000004 HC RX CONTRAST MEDICATION: Performed by: UROLOGY

## 2021-07-10 PROCEDURE — 74178 CT ABD&PLV WO CNTR FLWD CNTR: CPT

## 2021-07-10 PROCEDURE — 71046 X-RAY EXAM CHEST 2 VIEWS: CPT

## 2021-07-10 RX ADMIN — IOPAMIDOL 120 ML: 755 INJECTION, SOLUTION INTRAVENOUS at 08:12

## 2021-07-13 ENCOUNTER — TELEPHONE (OUTPATIENT)
Dept: PULMONOLOGY | Age: 71
End: 2021-07-13

## 2021-07-13 NOTE — TELEPHONE ENCOUNTER
FYI:  Bob Coyle wants you to know that he has just recently had CXR on 7/10/21. You may call him if you have any questions.

## 2021-07-21 ENCOUNTER — OFFICE VISIT (OUTPATIENT)
Dept: INTERNAL MEDICINE CLINIC | Age: 71
End: 2021-07-21
Payer: MEDICARE

## 2021-07-21 VITALS
WEIGHT: 128 LBS | BODY MASS INDEX: 20.09 KG/M2 | RESPIRATION RATE: 16 BRPM | HEART RATE: 66 BPM | DIASTOLIC BLOOD PRESSURE: 82 MMHG | SYSTOLIC BLOOD PRESSURE: 124 MMHG | HEIGHT: 67 IN

## 2021-07-21 DIAGNOSIS — E11.9 TYPE 2 DIABETES MELLITUS WITHOUT COMPLICATION, WITH LONG-TERM CURRENT USE OF INSULIN (HCC): Primary | ICD-10-CM

## 2021-07-21 DIAGNOSIS — J30.2 SEASONAL ALLERGIC RHINITIS, UNSPECIFIED TRIGGER: Chronic | ICD-10-CM

## 2021-07-21 DIAGNOSIS — C34.91 SQUAMOUS CELL CARCINOMA OF RIGHT LUNG (HCC): ICD-10-CM

## 2021-07-21 DIAGNOSIS — I50.42 CHRONIC COMBINED SYSTOLIC AND DIASTOLIC CONGESTIVE HEART FAILURE (HCC): ICD-10-CM

## 2021-07-21 DIAGNOSIS — E55.9 VITAMIN D DEFICIENCY: ICD-10-CM

## 2021-07-21 DIAGNOSIS — E78.2 MIXED HYPERLIPIDEMIA: ICD-10-CM

## 2021-07-21 DIAGNOSIS — J44.9 COPD, MODERATE (HCC): ICD-10-CM

## 2021-07-21 DIAGNOSIS — C34.2 PRIMARY CANCER OF RIGHT MIDDLE LOBE OF LUNG (HCC): ICD-10-CM

## 2021-07-21 DIAGNOSIS — K21.9 GASTROESOPHAGEAL REFLUX DISEASE WITHOUT ESOPHAGITIS: ICD-10-CM

## 2021-07-21 DIAGNOSIS — Z79.4 TYPE 2 DIABETES MELLITUS WITHOUT COMPLICATION, WITH LONG-TERM CURRENT USE OF INSULIN (HCC): Primary | ICD-10-CM

## 2021-07-21 DIAGNOSIS — M1A.0490 IDIOPATHIC CHRONIC GOUT OF HAND WITHOUT TOPHUS, UNSPECIFIED LATERALITY: ICD-10-CM

## 2021-07-21 DIAGNOSIS — C67.2 MALIGNANT NEOPLASM OF LATERAL WALL OF URINARY BLADDER (HCC): ICD-10-CM

## 2021-07-21 DIAGNOSIS — I10 ESSENTIAL HYPERTENSION: ICD-10-CM

## 2021-07-21 PROCEDURE — 3052F HG A1C>EQUAL 8.0%<EQUAL 9.0%: CPT | Performed by: INTERNAL MEDICINE

## 2021-07-21 PROCEDURE — 99214 OFFICE O/P EST MOD 30 MIN: CPT | Performed by: INTERNAL MEDICINE

## 2021-07-21 SDOH — ECONOMIC STABILITY: FOOD INSECURITY: WITHIN THE PAST 12 MONTHS, THE FOOD YOU BOUGHT JUST DIDN'T LAST AND YOU DIDN'T HAVE MONEY TO GET MORE.: NEVER TRUE

## 2021-07-21 SDOH — ECONOMIC STABILITY: FOOD INSECURITY: WITHIN THE PAST 12 MONTHS, YOU WORRIED THAT YOUR FOOD WOULD RUN OUT BEFORE YOU GOT MONEY TO BUY MORE.: NEVER TRUE

## 2021-07-21 ASSESSMENT — PATIENT HEALTH QUESTIONNAIRE - PHQ9
2. FEELING DOWN, DEPRESSED OR HOPELESS: 0
1. LITTLE INTEREST OR PLEASURE IN DOING THINGS: 0
SUM OF ALL RESPONSES TO PHQ QUESTIONS 1-9: 0
SUM OF ALL RESPONSES TO PHQ9 QUESTIONS 1 & 2: 0

## 2021-07-21 ASSESSMENT — ENCOUNTER SYMPTOMS
GASTROINTESTINAL NEGATIVE: 1
RESPIRATORY NEGATIVE: 1
ALLERGIC/IMMUNOLOGIC NEGATIVE: 1
EYES NEGATIVE: 1
RHINORRHEA: 1

## 2021-07-21 ASSESSMENT — SOCIAL DETERMINANTS OF HEALTH (SDOH): HOW HARD IS IT FOR YOU TO PAY FOR THE VERY BASICS LIKE FOOD, HOUSING, MEDICAL CARE, AND HEATING?: NOT HARD AT ALL

## 2021-07-21 NOTE — ASSESSMENT & PLAN NOTE
Monitored by specialist- no acute findings meriting change in the plan last CT and CXR were ok cont F/U with Dr Mariaa Kc

## 2021-07-21 NOTE — ASSESSMENT & PLAN NOTE
Well-controlled, continue current medications and lifestyle modifications recommended check labs as noted

## 2021-07-21 NOTE — ASSESSMENT & PLAN NOTE
Borderline controlled, continue current medications and lifestyle modifications recommended cont diet and meds check labs in 3 mo

## 2021-07-21 NOTE — PROGRESS NOTES
is current. Hyperlipidemia  This is a chronic problem. The current episode started more than 1 year ago. The problem is controlled. Recent lipid tests were reviewed and are low. Exacerbating diseases include diabetes. There are no known factors aggravating his hyperlipidemia. Pertinent negatives include no chest pain. Current antihyperlipidemic treatment includes diet change, exercise and statins. The current treatment provides significant improvement of lipids. There are no compliance problems. Risk factors for coronary artery disease include dyslipidemia, diabetes mellitus and hypertension. Allergies   Allergen Reactions    Metformin And Related Diarrhea       Current Outpatient Medications   Medication Sig Dispense Refill    dapagliflozin (FARXIGA) 10 MG tablet Take 1 tablet by mouth every morning 90 tablet 1    furosemide (LASIX) 20 MG tablet TAKE 1 TABLET BY MOUTH EVERY DAY 90 tablet 1    lisinopril (PRINIVIL;ZESTRIL) 40 MG tablet TAKE 1 TABLET BY MOUTH EVERY DAY 90 tablet 2    insulin glargine (LANTUS SOLOSTAR) 100 UNIT/ML injection pen Inject 22 Units into the skin nightly 5 pen 3    rosuvastatin (CRESTOR) 20 MG tablet TAKE 1 TABLET BY MOUTH EVERY DAY 90 tablet 1    albuterol sulfate  (90 Base) MCG/ACT inhaler TAKE 2 PUFFS BY MOUTH EVERY 6 HOURS AS NEEDED FOR WHEEZE 18 Inhaler 3    carvedilol (COREG) 25 MG tablet TAKE 1 TABLET BY MOUTH TWICE A DAY WITH MEALS 180 tablet 1    omeprazole (PRILOSEC) 20 MG delayed release capsule Take 20 mg by mouth as needed       amLODIPine (NORVASC) 10 MG tablet TAKE 1 TABLET BY MOUTH EVERY DAY 90 tablet 0    aspirin 81 MG tablet Take 81 mg by mouth daily       No current facility-administered medications for this visit.        Past Medical History:   Diagnosis Date    Allergic rhinitis, cause unspecified 7/15/2010    Cancer ([facility] [address]     lung    Colon polyps     Diabetes mellitus ([facility] [address]     Essential hypertension, benign 7/15/2010    HYPERCHOLESTERAEMIA     Hypertension     Lipoma of other specified sites 7/15/2010    Other abnormal blood chemistry 7/15/2010    Other and unspecified hyperlipidemia 7/15/2010    Other symptoms involving cardiovascular system 7/15/2010    Psychosexual dysfunction with inhibited sexual excitement 7/15/2010       Family History   Problem Relation Age of Onset    Breast Cancer Mother        Past Surgical History:   Procedure Laterality Date    BRONCHOSCOPY Right 2017    Dr. Randi Tanner - w/R BI stent placement size 12x3    BRONCHOSCOPY  2017    FAVIO Aguilar - w/EBUS    BRONCHOSCOPY  2017    COLONOSCOPY  10/31/2007    polyps    COLONOSCOPY  2012    polyp- repeat     COLONOSCOPY  2020    polyp Dr Tijerina Fall repeat      COLONOSCOPY  2020    Dr. Beatriz Martinez, polyps removed, repeat 2023    CYSTOSCOPY N/A 2019    CYSTOSCOPY TRANSURETHRAL RESECTION BLADDER performed by Bhakti Rao MD at [facility] 36 Left 2019    OPEN LEFT INGUINAL HERNIA REPAIR WITH MESH performed by Earlene Alvarez MD at [facility] 45 Right 1975       Social History     Socioeconomic History    Marital status:      Spouse name: Not on file    Number of children: Not on file    Years of education: Not on file    Highest education level: Not on file   Occupational History    Not on file   Tobacco Use    Smoking status: Former Smoker     Packs/day: 0.50     Years: 40.00     Pack years: 20.00     Types: Cigarettes     Start date: 1965     Quit date: 2017     Years since quittin.2    Smokeless tobacco: Never Used    Tobacco comment:  To try gum or patch to start quiting classes    Vaping Use    Vaping Use: Never used   Substance and Sexual Activity    Alcohol use: Yes     Comment: rare    Drug use: Not Currently     Types: Marijuana    Sexual activity: Yes     Partners: Female     Comment: on occasion   Other Topics Concern    Not on file   Social History Narrative    Not on file     Social Determinants of Health     Financial Resource Strain: Low Risk     Difficulty of Paying Living Expenses: Not hard at all   Food Insecurity: No Food Insecurity    Worried About Running Out of Food in the Last Year: Never true    [address] in the Last Year: Never true   Transportation Needs:     Lack of Transportation (Medical):  Lack of Transportation (Non-Medical):    Physical Activity:     Days of Exercise per Week:     Minutes of Exercise per Session:    Stress:     Feeling of Stress :    Social Connections:     Frequency of Communication with Friends and Family:     Frequency of Social Gatherings with Friends and Family:     Attends Hindu Services:     Active Member of Clubs or Organizations:     Attends Club or Organization Meetings:     Marital Status:    Intimate Partner Violence:     Fear of Current or Ex-Partner:     Emotionally Abused:     Physically Abused:     Sexually Abused:        Review of Systems  Review of Systems   HENT: Positive for congestion, postnasal drip and rhinorrhea. Eyes: Negative. Reading glasses   Respiratory: Negative. S/P CA of Lung as noted cont F/U with Dr Mariaa Kc etc    Cardiovascular: Negative. Negative for chest pain and palpitations. Gastrointestinal: Negative. Needs repeat colonoscopy for polyp  Was to be done 2017 but not done 2nd to lung CA -r repeat 2/20 - polyps repeat 2023  per Dr Lior Bernard   S/P L inguinal hernia 2019   Endocrine: Negative. Negative for polyphagia. Genitourinary: Positive for frequency. Nocturia  Needs PSA in future- had recent Cysto for prostate nodule Bx was negative done at [facility] seeing Dr Jose Juan Kirby for bladder CA see reports    Musculoskeletal: Negative. Back pain: improved         Occ RLS symptoms    Skin: Negative. Allergic/Immunologic: Negative. Neurological: Negative.   Negative for numbness and headaches. Hematological: Negative. Psychiatric/Behavioral: Negative. Objective:   Physical Exam:  Physical Exam  Constitutional:       Appearance: He is well-developed. HENT:      Head: Normocephalic and atraumatic. Right Ear: External ear normal.      Left Ear: External ear normal.   Eyes:      Extraocular Movements: Extraocular movements intact. Conjunctiva/sclera: Conjunctivae normal.      Pupils: Pupils are equal, round, and reactive to light. Neck:      Thyroid: No thyromegaly. Trachea: No tracheal deviation. Cardiovascular:      Rate and Rhythm: Normal rate and regular rhythm. Heart sounds: Normal heart sounds. No murmur heard. Pulmonary:      Effort: Pulmonary effort is normal.      Breath sounds: Normal breath sounds. Genitourinary:     Comments:    Musculoskeletal:         General: Normal range of motion. Cervical back: Normal range of motion and neck supple. Skin:     General: Skin is warm and dry. Neurological:      General: No focal deficit present. Mental Status: He is alert and oriented to person, place, and time. Deep Tendon Reflexes: Reflexes are normal and symmetric. Psychiatric:         Mood and Affect: Mood normal.         Behavior: Behavior normal.         Thought Content: Thought content normal.         Judgment: Judgment normal.         /82 (Site: Right Upper Arm, Position: Sitting, Cuff Size: Medium Adult)   Pulse 66   Resp 16   Ht 5' 7\" (1.702 m)   Wt 128 lb (58.1 kg)   BMI 20.05 kg/m²       Assessment & Plan:         Vitamin D deficiency   Well-controlled, continue current medications and lifestyle modifications recommended Repeat labs in 3 mo.      Type 2 diabetes mellitus without complication, with long-term current use of insulin (HCC)   Borderline controlled, continue current medications and lifestyle modifications recommended cont diet and meds check labs in 3 mo     Squamous cell lung cancer ([facility] [address]   Monitored by specialist- no acute findings meriting change in the plan last CT and CXR were ok cont F/U with Dr Faye Craig     Primary cancer of right middle lobe of lung ([facility] [address]   Monitored by specialist- no acute findings meriting change in the plan    Mixed hyperlipidemia   Well-controlled, continue current medications and lifestyle modifications recommended Repeat labs in 3 mo.      Malignant neoplasm of lateral wall of urinary bladder (HCC)   Monitored by specialist- no acute findings meriting change in the plan last W/U was ok as noted     Gout   Well-controlled, continue current medications and lifestyle modifications recommended check labs as noted     Gastroesophageal reflux disease without esophagitis   Well-controlled, continue current medications and lifestyle modifications recommended diet and meds     Essential hypertension   Well-controlled, continue current medications and lifestyle modifications recommended cont to check BP at home     COPD, moderate ([facility] [address]   Monitored by specialist- no acute findings meriting change in the plan cont current meds and F/u     Chronic combined systolic and diastolic congestive heart failure (HCC)   Well-controlled, continue current medications and lifestyle modifications recommended    Allergic rhinitis   Well-controlled, continue current medications and lifestyle modifications recommended cont prn Tx with Flonase,Claritin and Mucinex DM

## 2021-07-21 NOTE — ASSESSMENT & PLAN NOTE
Well-controlled, continue current medications and lifestyle modifications recommended cont prn Tx with Flonase,Claritin and Mucinex DM

## 2021-07-25 LAB — PATHOLOGY/CYTOLOGY REPORT: NORMAL

## 2021-08-02 LAB — PATHOLOGY/CYTOLOGY REPORT: NORMAL

## 2021-09-15 RX ORDER — CARVEDILOL 25 MG/1
TABLET ORAL
Qty: 180 TABLET | Refills: 1 | Status: SHIPPED | OUTPATIENT
Start: 2021-09-15

## 2021-09-15 NOTE — TELEPHONE ENCOUNTER
Received refill request for Carvedilol from [facility] pharmacy.     Last ov:2021 NPKV    Last EK2020    Last Refill:03/15/2021 #180 tabs w/ 1 refill    Next appointment:2021 NPKV

## 2021-10-13 ENCOUNTER — HOSPITAL ENCOUNTER (OUTPATIENT)
Age: 71
Discharge: HOME | End: 2021-10-13
Payer: MEDICARE

## 2021-10-13 DIAGNOSIS — J30.2 SEASONAL ALLERGIC RHINITIS, UNSPECIFIED TRIGGER: Chronic | ICD-10-CM

## 2021-10-13 DIAGNOSIS — E78.2 MIXED HYPERLIPIDEMIA: ICD-10-CM

## 2021-10-13 DIAGNOSIS — C34.2 PRIMARY CANCER OF RIGHT MIDDLE LOBE OF LUNG (HCC): ICD-10-CM

## 2021-10-13 DIAGNOSIS — I50.42 CHRONIC COMBINED SYSTOLIC AND DIASTOLIC CONGESTIVE HEART FAILURE (HCC): ICD-10-CM

## 2021-10-13 DIAGNOSIS — E11.9 TYPE 2 DIABETES MELLITUS WITHOUT COMPLICATION, WITH LONG-TERM CURRENT USE OF INSULIN (HCC): ICD-10-CM

## 2021-10-13 DIAGNOSIS — C34.91 SQUAMOUS CELL CARCINOMA OF RIGHT LUNG (HCC): ICD-10-CM

## 2021-10-13 DIAGNOSIS — E55.9 VITAMIN D DEFICIENCY: ICD-10-CM

## 2021-10-13 DIAGNOSIS — M1A.0490 IDIOPATHIC CHRONIC GOUT OF HAND WITHOUT TOPHUS, UNSPECIFIED LATERALITY: ICD-10-CM

## 2021-10-13 DIAGNOSIS — C67.2 MALIGNANT NEOPLASM OF LATERAL WALL OF URINARY BLADDER (HCC): ICD-10-CM

## 2021-10-13 DIAGNOSIS — I10 ESSENTIAL HYPERTENSION: ICD-10-CM

## 2021-10-13 DIAGNOSIS — K21.9 GASTROESOPHAGEAL REFLUX DISEASE WITHOUT ESOPHAGITIS: ICD-10-CM

## 2021-10-13 DIAGNOSIS — J44.9 COPD, MODERATE (HCC): ICD-10-CM

## 2021-10-13 DIAGNOSIS — Z79.4 TYPE 2 DIABETES MELLITUS WITHOUT COMPLICATION, WITH LONG-TERM CURRENT USE OF INSULIN (HCC): ICD-10-CM

## 2021-10-13 LAB
A/G RATIO: 1.2 (ref 1.1–2.2)
ALBUMIN SERPL-MCNC: 4.6 G/DL (ref 3.4–5)
ALP BLD-CCNC: 143 U/L (ref 40–129)
ALT SERPL-CCNC: 48 U/L (ref 10–40)
ANION GAP SERPL CALCULATED.3IONS-SCNC: 15 MMOL/L (ref 3–16)
AST SERPL-CCNC: 34 U/L (ref 15–37)
BASOPHILS ABSOLUTE: 0 K/UL (ref 0–0.2)
BASOPHILS RELATIVE PERCENT: 0.6 %
BILIRUB SERPL-MCNC: 0.4 MG/DL (ref 0–1)
BUN BLDV-MCNC: 17 MG/DL (ref 7–20)
CALCIUM SERPL-MCNC: 9.8 MG/DL (ref 8.3–10.6)
CHLORIDE BLD-SCNC: 102 MMOL/L (ref 99–110)
CHOLESTEROL, TOTAL: 151 MG/DL (ref 0–199)
CO2: 22 MMOL/L (ref 21–32)
CREAT SERPL-MCNC: 1.1 MG/DL (ref 0.8–1.3)
EOSINOPHILS ABSOLUTE: 0.1 K/UL (ref 0–0.6)
EOSINOPHILS RELATIVE PERCENT: 1.9 %
GFR AFRICAN AMERICAN: >60
GFR NON-AFRICAN AMERICAN: >60
GLOBULIN: 3.7 G/DL
GLUCOSE BLD-MCNC: 127 MG/DL (ref 70–99)
HCT VFR BLD CALC: 40 % (ref 40.5–52.5)
HDLC SERPL-MCNC: 42 MG/DL (ref 40–60)
HEMOGLOBIN: 13.6 G/DL (ref 13.5–17.5)
LDL CHOLESTEROL CALCULATED: 81 MG/DL
LYMPHOCYTES ABSOLUTE: 1.1 K/UL (ref 1–5.1)
LYMPHOCYTES RELATIVE PERCENT: 16 %
MCH RBC QN AUTO: 30.4 PG (ref 26–34)
MCHC RBC AUTO-ENTMCNC: 33.9 G/DL (ref 31–36)
MCV RBC AUTO: 89.8 FL (ref 80–100)
MONOCYTES ABSOLUTE: 0.5 K/UL (ref 0–1.3)
MONOCYTES RELATIVE PERCENT: 7.5 %
NEUTROPHILS ABSOLUTE: 5.3 K/UL (ref 1.7–7.7)
NEUTROPHILS RELATIVE PERCENT: 74 %
PDW BLD-RTO: 13 % (ref 12.4–15.4)
PLATELET # BLD: 185 K/UL (ref 135–450)
PMV BLD AUTO: 8 FL (ref 5–10.5)
POTASSIUM SERPL-SCNC: 5.6 MMOL/L (ref 3.5–5.1)
RBC # BLD: 4.46 M/UL (ref 4.2–5.9)
SODIUM BLD-SCNC: 139 MMOL/L (ref 136–145)
TOTAL PROTEIN: 8.3 G/DL (ref 6.4–8.2)
TRIGL SERPL-MCNC: 138 MG/DL (ref 0–150)
TSH REFLEX: 2.53 UIU/ML (ref 0.27–4.2)
URIC ACID, SERUM: 6.4 MG/DL (ref 3.5–7.2)
VLDLC SERPL CALC-MCNC: 28 MG/DL
WBC # BLD: 7.1 K/UL (ref 4–11)

## 2021-10-13 PROCEDURE — 36415 COLL VENOUS BLD VENIPUNCTURE: CPT

## 2021-10-13 PROCEDURE — 80061 LIPID PANEL: CPT

## 2021-10-13 PROCEDURE — 83036 HEMOGLOBIN GLYCOSYLATED A1C: CPT

## 2021-10-13 PROCEDURE — 85025 COMPLETE CBC W/AUTO DIFF WBC: CPT

## 2021-10-13 PROCEDURE — 84550 ASSAY OF BLOOD/URIC ACID: CPT

## 2021-10-13 PROCEDURE — 80053 COMPREHEN METABOLIC PANEL: CPT

## 2021-10-13 PROCEDURE — 84443 ASSAY THYROID STIM HORMONE: CPT

## 2021-10-14 LAB
ESTIMATED AVERAGE GLUCOSE: 205.9 MG/DL
HBA1C MFR BLD: 8.8 %

## 2021-11-16 ASSESSMENT — ENCOUNTER SYMPTOMS
GASTROINTESTINAL NEGATIVE: 1
RESPIRATORY NEGATIVE: 1

## 2021-11-16 NOTE — PROGRESS NOTES
[facility]   Congestive Heart Failure    PrimaryCare Doctor:  Tamara Amaya MD      Chief Complaint:  CHF    History of Present Illness:  Fatuma Lagos is a 66 y.o. male with PMH COPD, lung cancer, HTN, DM, HLD, HFmrEF (40-45% recovered to 50%) who presents today for CHF f/u. OV 6months ago: no med changes    Today he states that he still feels great, very active, still abl to cut his grass with a push mower, rake leaves and he denies chest pain, dyspnea, palpitations, orthopnea, PND, exertional chest pressure/discomfort, fatigue, early saiety, edema, syncope. BP elevated today,  Has not had norvasc yet today    Wt stable    ER Visit: No  Recent Hospitalization: No    Baseline Weight: 131-132lb  Wt Readings from Last 3 Encounters:   11/18/21 127 lb (57.6 kg)   07/21/21 128 lb (58.1 kg)   05/18/21 128 lb (58.1 kg)     Baseline BNP:<500    EF: 50%  Cardiac Imaging:Echo 5/18/21:   Summary   Technically difficult and limited examination. Overall, left ventricular systolic function is borderline with LVEF   estimated at 50%. No obvious regional wall motion abnormalities are noted. Aortic valve appears sclerotic but opens adequately. Echo 12/18/2018:  Summary   -Technically difficult examination. Lung Cancer.   -Normal left ventricular cavity size and wall thickness.   -Moderately reduced systolic function with an ejection fraction of 40-45%.  -No obvious regional wall motion abnormalities noted.   -Normal diastolic function.   - Mild mitral regurgitation.   -There is sclerotic nodular thickening of the aortic valve cusps.  -Thickening on the tricuspid annulus.   -Trivial tricuspid regurgitation with a RVSP of 29 mmHg.     Echo 8/8/2018:   Summary   -This is a limited exam to evaluate aortic valve. Patient had a complete   exam 4/12/2018   -Normal global systolic function with an ejection fraction of 55%.    -No obvious regional wall motion abnormalities noted.   -There is sclerotic nodular thickening of the aortic valve cusps without evidence of significant stenosis or regurgitation.     MPI 4/12/18:  Summary   No EKG evidence for ischemia with exercise   Normal LV size and systolic function.   There is normal isotope uptake at stress and rest. There is no evidence of  myocardial ischemia or scar.      Device: No     Activity: at baseline  Can you walk 1-2 blocks or do a moderate amount of house/yard work? Yes    NYHA Class: I     Sodium Restrictions: 3g  Fluid Restrictions: 48-64 oz/day  Sodium and fluid restriction compliance: good    Pt Education: The patient has received education on the following topics: dietary sodium restriction, heart failure medications, the importance of physical activity, symptom management and weight monitoring     Past Medical History:   has a past medical history of Allergic rhinitis, cause unspecified, Cancer ([facility] [address] Colon polyps, Diabetes mellitus ([facility] [address] Essential hypertension, benign, HYPERCHOLESTERAEMIA, Hypertension, Lipoma of other specified sites, Other abnormal blood chemistry, Other and unspecified hyperlipidemia, Other symptoms involving cardiovascular system, and Psychosexual dysfunction with inhibited sexual excitement. Surgical History:   has a past surgical history that includes Colonoscopy (10/31/2007); Colonoscopy (2012); bronchoscopy (Right, 04/27/2017); bronchoscopy (04/24/2017); Inguinal hernia repair (Right, 1975); bronchoscopy (09/12/2017); hernia repair (Left, 08/14/2019); Cystoscopy (N/A, 08/14/2019); Colonoscopy (02/2020); and Colonoscopy (02/06/2020). Social History:   reports that he quit smoking about 4 years ago. His smoking use included cigarettes. He started smoking about 56 years ago. He has a 20.00 pack-year smoking history. He has never used smokeless tobacco. He reports current alcohol use. He reports previous drug use. Drug: Marijuana [place]).    Family History:   Family History   Problem Relation Age of Onset   [facility] Breast Cancer Mother HomeMedications:  Prior to Admission medications    Medication Sig Start Date End Date Taking? Authorizing Provider   carvedilol (COREG) 25 MG tablet TAKE 1 TABLET BY MOUTH TWICE A DAY WITH MEALS 9/15/21  Yes NIDIA Roach CNP   dapagliflozin (FARXIGA) 10 MG tablet Take 1 tablet by mouth every morning 7/21/21 7/16/22 Yes Fredis Delgado MD   furosemide (LASIX) 20 MG tablet TAKE 1 TABLET BY MOUTH EVERY DAY 5/25/21  Yes NIDIA Roach CNP   lisinopril (PRINIVIL;ZESTRIL) 40 MG tablet TAKE 1 TABLET BY MOUTH EVERY DAY 5/25/21  Yes Fredis Delgado MD   insulin glargine (LANTUS SOLOSTAR) 100 UNIT/ML injection pen Inject 22 Units into the skin nightly 5/20/21  Yes Fredis Delgado MD   rosuvastatin (CRESTOR) 20 MG tablet TAKE 1 TABLET BY MOUTH EVERY DAY 3/24/21  Yes Fredis Delgado MD   albuterol sulfate  (90 Base) MCG/ACT inhaler TAKE 2 PUFFS BY MOUTH EVERY 6 HOURS AS NEEDED FOR WHEEZE 3/19/21  Yes Jeffery Evans MD   omeprazole (PRILOSEC) 20 MG delayed release capsule Take 20 mg by mouth as needed    Yes Historical Provider, MD   amLODIPine (NORVASC) 10 MG tablet TAKE 1 TABLET BY MOUTH EVERY DAY 11/17/17  Yes Fredis Delgado MD   aspirin 81 MG tablet Take 81 mg by mouth daily   Yes Historical Provider, MD        Allergies:  Metformin and related     ROS:   Review of Systems   Constitutional: Negative. Respiratory: Negative. Cardiovascular: Negative. Gastrointestinal: Negative. Genitourinary: Negative. Musculoskeletal: Negative. Skin: Negative. Neurological: Negative. Hematological: Negative. Psychiatric/Behavioral: Negative. Physical Examination:    Vitals:    11/18/21 0900   BP: (!) 148/88   Site: Left Upper Arm   Position: Sitting   Cuff Size: Medium Adult   Pulse: 82   SpO2: 98%   Weight: 127 lb (57.6 kg)   Height: 5' 7\" (1.702 m)           Physical Exam  Constitutional:       Appearance: He is well-developed.    HENT:      Head: Normocephalic and atraumatic. Eyes:      Pupils: Pupils are equal, round, and reactive to light. Cardiovascular:      Rate and Rhythm: Normal rate and regular rhythm. Pulses: Normal pulses. Heart sounds: Normal heart sounds. Pulmonary:      Effort: Pulmonary effort is normal.      Breath sounds: Normal breath sounds. Abdominal:      Palpations: Abdomen is soft. Musculoskeletal:         General: Normal range of motion. Cervical back: Normal range of motion and neck supple. Skin:     General: Skin is warm and dry. Neurological:      General: No focal deficit present. Mental Status: He is alert and oriented to person, place, and time. Mental status is at baseline. Psychiatric:         Behavior: Behavior normal.         Thought Content:  Thought content normal.         Judgment: Judgment normal.         Lab Data:    CBC:   Lab Results   Component Value Date    WBC 7.1 10/13/2021    WBC 6.5 04/21/2021    WBC 7.0 01/13/2021    RBC 4.46 10/13/2021    RBC 4.61 04/21/2021    RBC 4.49 01/13/2021    RBC 4.24 06/29/2017    RBC 4.04 06/22/2017    RBC 4.43 06/15/2017    HGB 13.6 10/13/2021    HGB 13.9 04/21/2021    HGB 13.5 01/13/2021    HCT 40.0 10/13/2021    HCT 41.6 04/21/2021    HCT 39.7 01/13/2021    MCV 89.8 10/13/2021    MCV 90.2 04/21/2021    MCV 88.3 01/13/2021    RDW 13.0 10/13/2021    RDW 14.1 04/21/2021    RDW 13.5 01/13/2021     10/13/2021     04/21/2021     01/13/2021     BMP:  Lab Results   Component Value Date     10/13/2021     04/16/2021     01/13/2021    K 5.6 10/13/2021    K 5.0 04/16/2021    K 4.1 01/13/2021    K 3.9 12/19/2018    K 3.9 04/13/2018     10/13/2021     04/16/2021     01/13/2021    CO2 22 10/13/2021    CO2 26 04/16/2021    CO2 24 01/13/2021    PHOS 2.5 05/04/2017    BUN 17 10/13/2021    BUN 19 04/16/2021    BUN 15 01/13/2021    CREATININE 1.1 10/13/2021    CREATININE 1.0 04/16/2021    CREATININE 0.9 01/13/2021     BNP: Lab Results   Component Value Date    PROBNP 149 05/21/2020    PROBNP 205 09/11/2019    PROBNP 575 03/13/2019     Iron Studies:  No components found for: FE,  TIBC,  FERRITIN  Iron Deficiency Anemia:  No    IV Iron Therapy:  No  2017 ACC/AHA HF Guidelines:   intravenous iron replacement in patients with New York Heart Association (NYHA) class II and III HF and iron deficiency (ferritin <100 ng/ml or 100-300 ng/ml if transferrin saturation <20%), to improve functional status and QoL. Assessment/Plan:    Encounter Diagnoses   Name     Dilated cardiomyopathy ([facility] [address] On GDT, EF recovered    Essential hypertension Elevated today, monitor at home, bring home cuff to compare with ours    Chronic combined systolic and diastolic congestive heart failure ([facility] [address] Compensated, labs done per PCP              Instructions:   1. Medications:continue current medications  2. Lifestyle Recommendations: Weigh yourself every day in the morning after urination, call Sharon Colon if wt increases 2-3lb in one day or 5lb in one week, Limit sodium to 2000mg/day and fluids to 2L or 64oz/day. 3. Follow up: 6 months         [facility]: [phone]       I appreciate the opportunity of cooperating in the care of this individual.    NIDIA Antonio - CNP, CNP, 11/16/2021,3:18 PM    QUALITY MEASURES  1. Tobacco Cessation Counseling: NA  2. Retake of BP if >140/90:   NA  3. Documentation to PCP/referring for new patient:  Sent to PCP at close of office visit  4. CAD patient on anti-platelet: NA  5. CAD patient on STATIN therapy:  NA  6. Patient with CHF and aFib on anticoagulation:  NA   7. Patient Education:NA   8. BB for LVSD :  Yes   9. ACE/ARB for LVSD:  Yes   10.  Left Ventricular Ejection Fraction (LVEF) Assessment:  Yes

## 2021-11-18 ENCOUNTER — OFFICE VISIT (OUTPATIENT)
Dept: CARDIOLOGY CLINIC | Age: 71
End: 2021-11-18
Payer: MEDICARE

## 2021-11-18 VITALS
HEART RATE: 82 BPM | WEIGHT: 127 LBS | HEIGHT: 67 IN | OXYGEN SATURATION: 98 % | DIASTOLIC BLOOD PRESSURE: 88 MMHG | BODY MASS INDEX: 19.93 KG/M2 | SYSTOLIC BLOOD PRESSURE: 148 MMHG

## 2021-11-18 DIAGNOSIS — I10 ESSENTIAL HYPERTENSION: ICD-10-CM

## 2021-11-18 DIAGNOSIS — I42.0 DILATED CARDIOMYOPATHY (HCC): ICD-10-CM

## 2021-11-18 DIAGNOSIS — I50.42 CHRONIC COMBINED SYSTOLIC AND DIASTOLIC CONGESTIVE HEART FAILURE (HCC): Primary | ICD-10-CM

## 2021-11-18 PROCEDURE — 99214 OFFICE O/P EST MOD 30 MIN: CPT | Performed by: NURSE PRACTITIONER

## 2021-11-18 NOTE — PATIENT INSTRUCTIONS
Instructions:   1. Medications:continue current medications  2. Lifestyle Recommendations: Weigh yourself every day in the morning after urination, call Hussein Grumbles if wt increases 2-3lb in one day or 5lb in one week, Limit sodium to 2000mg/day and fluids to 2L or 64oz/day.     3. Follow up: 6 months         [facility]: [phone]

## 2021-12-11 LAB — PATHOLOGY/CYTOLOGY REPORT: NORMAL

## 2021-12-23 ENCOUNTER — TELEPHONE (OUTPATIENT)
Dept: INTERNAL MEDICINE CLINIC | Age: 71
End: 2021-12-23

## 2021-12-23 RX ORDER — ROSUVASTATIN CALCIUM 20 MG/1
TABLET, COATED ORAL
Qty: 30 TABLET | Refills: 3 | Status: SHIPPED | OUTPATIENT
Start: 2021-12-23

## 2021-12-28 NOTE — TELEPHONE ENCOUNTER
Attempted to call [facility] on [facility] three times, but no answer. Called [facility] on [address], and they were able to see that patient picked up rosuvastatin prescription yesterday 12/27/21. Will sign off at this time.     Umberto Ruiz, PharmD   12/28/2021, 10:37 AM
15 - 37 U/L Final       The 10-year ASCVD risk score (Neville Escobedo., et al., 2013) is: 50%    Values used to calculate the score:      Age: 66 years      Sex: Male      Is Non- : No      Diabetic: Yes      Tobacco smoker: No      Systolic Blood Pressure: 174 mmHg      Is BP treated: Yes      HDL Cholesterol: 42 mg/dL      Total Cholesterol: 151 mg/dL     Hyperlipidemia Goal: Patient is prescribed high-intensity statin therapy. PLAN  Attempting to reach patient to review.  Left message asking for return call. Will pend refill request and send to PCP today as it appears the prescription on file from March has likely  since not filled. Will follow-up with the patient and pharmacy next week to ensure rosuvastatin was picked up.      Sherry Bond, PharmD, [place] // Department, toll free [phone], option 1

## 2022-01-19 ENCOUNTER — TELEPHONE (OUTPATIENT)
Dept: PULMONOLOGY | Age: 72
End: 2022-01-19

## 2022-01-19 DIAGNOSIS — R06.2 WHEEZING: ICD-10-CM

## 2022-01-19 RX ORDER — AMLODIPINE BESYLATE 10 MG/1
TABLET ORAL
Qty: 30 TABLET | Refills: 0 | Status: SHIPPED | OUTPATIENT
Start: 2022-01-19 | End: 2022-02-24

## 2022-01-19 RX ORDER — ALBUTEROL SULFATE 90 UG/1
AEROSOL, METERED RESPIRATORY (INHALATION)
Qty: 18 G | Refills: 0 | Status: SHIPPED | OUTPATIENT
Start: 2022-01-19 | End: 2022-03-14

## 2022-01-19 NOTE — TELEPHONE ENCOUNTER
Pt wanted to make sure Dr. Janette Levin was aware of the testing he had on 7/10/2021. He also wanted to know if Dr. Janette Levin was still going to be his doctor. 2 of his doctors have or are retiring and he just wanted to make sure Dr. Janette Levin was still in \"business\" I advised pt that the doctor has NOT mentioned anything about retiring.

## 2022-01-19 NOTE — TELEPHONE ENCOUNTER
That's an interesting phone call. You can pressure him that I have no plans of retiring anytime soon. But what else is interesting is that I was supposed to see him 9 months ago and I have not. We should probably get him back into be seen make sure everything is okay.

## 2022-01-19 NOTE — TELEPHONE ENCOUNTER
Per Dr. John Ortiz ok to fill medications: Amlodipine & Albuterol inhaler and needs to make an appointment with another Physician. Pt unvaccinated.

## 2022-02-24 RX ORDER — AMLODIPINE BESYLATE 10 MG/1
TABLET ORAL
Qty: 30 TABLET | Refills: 0 | Status: SHIPPED | OUTPATIENT
Start: 2022-02-24 | End: 2022-03-30

## 2022-03-11 DIAGNOSIS — R06.2 WHEEZING: ICD-10-CM

## 2022-03-14 ENCOUNTER — OFFICE VISIT (OUTPATIENT)
Dept: PULMONOLOGY | Age: 72
End: 2022-03-14
Payer: MEDICARE

## 2022-03-14 VITALS
SYSTOLIC BLOOD PRESSURE: 159 MMHG | BODY MASS INDEX: 19.25 KG/M2 | RESPIRATION RATE: 16 BRPM | TEMPERATURE: 96.8 F | DIASTOLIC BLOOD PRESSURE: 91 MMHG | WEIGHT: 127 LBS | HEART RATE: 75 BPM | HEIGHT: 68 IN | OXYGEN SATURATION: 100 %

## 2022-03-14 DIAGNOSIS — C34.91 SQUAMOUS CELL CARCINOMA OF RIGHT LUNG (HCC): ICD-10-CM

## 2022-03-14 DIAGNOSIS — C34.11 MALIGNANT NEOPLASM OF UPPER LOBE OF RIGHT LUNG (HCC): Primary | ICD-10-CM

## 2022-03-14 PROCEDURE — 99212 OFFICE O/P EST SF 10 MIN: CPT | Performed by: INTERNAL MEDICINE

## 2022-03-14 RX ORDER — ALBUTEROL SULFATE 90 UG/1
AEROSOL, METERED RESPIRATORY (INHALATION)
Qty: 18 EACH | Refills: 1 | Status: SHIPPED | OUTPATIENT
Start: 2022-03-14 | End: 2022-03-29

## 2022-03-14 NOTE — PROGRESS NOTES
[facility]    Outpatient Follow Up Note    Subjective:   CHIEF COMPLAINT / HPI:     The patient is 66 y.o. male who presents today for COPD, and lung cancer. Misty Capps has been breathing pretty well for the past year and a half since I saw him. He continues to remain active and complains that he is not been able to gain any weight. He has not had a follow-up CT scan in almost 2 years. He noticed a bump on his back that his daughter said felt like an egg so he was worried and made appointment to come in. Previous history:  Misty Capps is doing pretty well, however, he was admitted to [place] in December for shortness of breath from new systolic heart failure. He had a CTPA which was negative for clot and showed a stable esophageal lymph node. He had an echo showeing an EF of 40-45%. He's now on lasix and coreg. Previous history: Since last time I saw Mr. Angela Ramires in the office I performed a bronchoscopy with intent to do ebus however he had complete obstruction of his right upper lobe bronchus required debulking and biopsy and eventually stenting of the airway by Dr. Genna Pond. Patient's lung biopsies came back with squamous cell carcinoma. Following the stent placement which blocks off his right upper lobe Mr. Angela Ramires developed an asymptomatic right sided pneumothorax with complete collapse of the right lung. He was admitted to the hospital and we placed a small bore chest tube which evacuated the pneumothorax and then the chest tube fell out a day and a half later without resolution of the pneumothorax is in the hospital.  However, when he went to his next CT simulation for radiation oncology 4 days later the pneumothorax had recurred. Instead of admitting him this time because he was again asymptomatic, I sent him to interventional radiology where a new small bore chest tube was placed and attached to a pneumo stat.   He's had a small chest tube in for 2 weeks without recurrence of the pneumothorax thus far. He's feeling pretty well today and had radiation therapy earlier today. He's concerned because the tubing on his chest tube is getting longer and he scraped the chest tube is coming out. He does not want to have a third one placed.      Past Medical History:    Past Medical History:   Diagnosis Date    Allergic rhinitis, cause unspecified 7/15/2010    Cancer ([facility] [address]     lung    Colon polyps     Diabetes mellitus ([facility] [address]     Essential hypertension, benign 7/15/2010    HYPERCHOLESTERAEMIA     Hypertension     Lipoma of other specified sites 7/15/2010    Other abnormal blood chemistry 7/15/2010    Other and unspecified hyperlipidemia 7/15/2010    Other symptoms involving cardiovascular system 7/15/2010    Psychosexual dysfunction with inhibited sexual excitement 7/15/2010       Social History:    Social History     Tobacco Use   Smoking Status Former Smoker    Packs/day: 0.50    Years: 40.00    Pack years: 20.00    Types: Cigarettes    Start date: 1965   Cecimercedes Adrienne Quit date: 2017    Years since quittin.8   Smokeless Tobacco Never Used   Tobacco Comment    To try gum or patch to start quiting classes        Current Medications:  Current Outpatient Medications on File Prior to Visit   Medication Sig Dispense Refill    amLODIPine (NORVASC) 10 MG tablet TAKE 1 TABLET BY MOUTH EVERY DAY 30 tablet 0    albuterol sulfate  (90 Base) MCG/ACT inhaler TAKE 2 PUFFS BY MOUTH EVERY 6 HOURS AS NEEDED FOR WHEEZE 18 g 0    rosuvastatin (CRESTOR) 20 MG tablet TAKE 1 TABLET BY MOUTH EVERY DAY 30 tablet 3    carvedilol (COREG) 25 MG tablet TAKE 1 TABLET BY MOUTH TWICE A DAY WITH MEALS 180 tablet 1    dapagliflozin (FARXIGA) 10 MG tablet Take 1 tablet by mouth every morning 90 tablet 1    furosemide (LASIX) 20 MG tablet TAKE 1 TABLET BY MOUTH EVERY DAY 90 tablet 1    lisinopril (PRINIVIL;ZESTRIL) 40 MG tablet TAKE 1 TABLET BY MOUTH EVERY DAY 90 tablet 2    insulin glargine (LANTUS SOLOSTAR) 100 UNIT/ML injection pen Inject 22 Units into the skin nightly 5 pen 3    omeprazole (PRILOSEC) 20 MG delayed release capsule Take 20 mg by mouth as needed       aspirin 81 MG tablet Take 81 mg by mouth daily       No current facility-administered medications on file prior to visit. REVIEW OF SYSTEMS:    CONSTITUTIONAL: Negative for fevers and chills  HEENT: Negative for oropharyngeal exudate, post nasal drip, sinus pain / pressure, nasal congestion, ear pain  RESPIRATORY:  See HPI  CARDIOVASCULAR: Negative for chest pain, palpitations, edema  GASTROINTESTINAL: Negative for nausea, vomiting, diarrhea, constipation and abdominal pain  HEMATOLOGICAL: Negative for adenopathy  SKIN: Negative for clubbing, cyanosis, skin lesions  EXTREMITIES: Negative for weakness, decreased ROM  NEUROLOGICAL: Negative for unilateral weakness, speech or gait abnormalities  PSYCH: Negative for anxiety, depression    Objective:   PHYSICAL EXAM:        VITALS:  BP (!) 159/91 (Site: Left Upper Arm, Position: Sitting, Cuff Size: Medium Adult)   Pulse 75   Temp 96.8 °F (36 °C) (Infrared)   Resp 16   Ht 5' 8\" (1.727 m)   Wt 127 lb (57.6 kg)   SpO2 100%   BMI 19.31 kg/m²     CONSTITUTIONAL:  Awake, alert, cooperative, no apparent distress, and appears stated age  HEENT: No oropharyngeal exudate, PERRL, no cervical adenopathy, no tracheal deviation, thyroid size normal  LUNGS:  No increased work of breathing and clear to auscultation, no crackles. No wheezing. Transmitted breath sounds in RUL  CARDIOVASCULAR:  normal S1 and S2 and no JVD  ABDOMEN:  Normal bowel sounds, non-distended and non-tender to palpation  EXT: No edema, no calf tenderness. Pulses are present bilaterally. NEUROLOGIC:  Mental Status Exam:  Level of Alertness:   awake  Orientation:   person, place, time.   SKIN:  normal skin color, texture, turgor, no redness, warmth, or swelling, with the exception of the area around his right scapula which has small areas of erythema and hyperpigmentation. DATA:      Radiology Review:  Pertinent images / reports were reviewed as a part of this visit. Chest x-ray reveals the following:     Post procedural chest film shows the small bore chest tube   extending to the lung apex with virtually complete reexpansion of   the right lung following manual evacuation of pneumothorax. Consolidated lung in the right upper chest is consistent with   atelectasis. Trachea is midline.       Impression:       Chest tube placement with reexpansion of the right lung         3/18  No change since 1/24/2018, with extensive atelectasis in the right   lung and soft tissue thickening in the right hilar region. 10/18  Impression       1.  Posttherapeutic changes reidentified in the right upper lobe including confluent airspace disease posteriorly into the superior segment of the lower lobe, similar to the previous study. There are slightly increased groundglass and reticular opacities    anteriorly in the upper lobe, which are nonspecific but could represent ongoing fibrosis versus infection or inflammation. Attention on follow-up.       2.  The small amount of loculated right apical pleural air on the previous study has been replaced with a small amount of pleural fluid.       3.  Borderline sized right paraesophageal lymph node, increased from previous study. 3/2019  Impression       1. Posttherapy changes noted with consolidation and bronchiectasis right apex.         No evidence recurrent or residual neoplasm.       Subcentimeter pretracheal lymph node unchanged benign. No significant lymphadenopathy.       Coronary artery calcification which is a risk factor for acute coronary syndrome.       Mild compression fracture superior endplate T7 with adjacent bony sclerosis may relate to posttraumatic or osteoporotic compression fracture. Pathologic compression fracture is not excluded. Continued follow-up recommended.  Findings present previously     9/2019  Impression       Consolidation/atelectasis and volume loss in the right thorax without change.       Chronic pancreatitis.       Stable small left pulmonary nodules. Last PFTs: CONCLUSION:  Moderate obstructive defect without bronchodilator  response. Air trapping present and moderate decrease in diffusion. These findings are most consistent with the diagnosis of COPD. Assessment: This is a 66 y.o. 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 Has a rescue inhaler. Lung cancer: Initially diagnosed 5 years ago with a tumor obstructing his right upper lobe. He been getting serial imaging until the pandemic. He is overdue for surveillance/screening. I did not feel anything on his back that was alarming so he is just due for serial imaging. Diagnosis Orders   1. Malignant neoplasm of upper lobe of right lung [facility])  CT CHEST WO CONTRAST        The patient is not currently smoking. Recommend maintaining a smoke-free lifestyle. RTC 6 months w/ MD. Call or RTC sooner if symptoms persist or worsen acutely.       Alex Burton MD

## 2022-03-24 RX ORDER — FUROSEMIDE 20 MG/1
TABLET ORAL
Qty: 90 TABLET | Refills: 1 | Status: SHIPPED | OUTPATIENT
Start: 2022-03-24

## 2022-03-29 DIAGNOSIS — R06.2 WHEEZING: ICD-10-CM

## 2022-03-29 RX ORDER — ALBUTEROL SULFATE 90 UG/1
AEROSOL, METERED RESPIRATORY (INHALATION)
Qty: 18 EACH | Refills: 3 | Status: SHIPPED | OUTPATIENT
Start: 2022-03-29 | End: 2022-10-24 | Stop reason: SDUPTHER

## 2022-03-29 NOTE — TELEPHONE ENCOUNTER
Pt of Dr Patsy Aponte,    Please refill pending medication if appropriate, LOV 03/14/22, NOV 09/12/22

## 2022-03-30 ENCOUNTER — HOSPITAL ENCOUNTER (OUTPATIENT)
Dept: CT IMAGING | Age: 72
Discharge: HOME | End: 2022-03-30
Payer: MEDICARE

## 2022-03-30 DIAGNOSIS — C34.11 MALIGNANT NEOPLASM OF UPPER LOBE OF RIGHT LUNG (HCC): ICD-10-CM

## 2022-03-30 PROCEDURE — 71250 CT THORAX DX C-: CPT

## 2022-03-30 RX ORDER — AMLODIPINE BESYLATE 10 MG/1
TABLET ORAL
Qty: 30 TABLET | Refills: 3 | Status: SHIPPED | OUTPATIENT
Start: 2022-03-30

## 2022-04-12 ENCOUNTER — OFFICE VISIT (OUTPATIENT)
Dept: PULMONOLOGY | Age: 72
End: 2022-04-12
Payer: MEDICARE

## 2022-04-12 VITALS
TEMPERATURE: 96.6 F | HEART RATE: 87 BPM | DIASTOLIC BLOOD PRESSURE: 99 MMHG | SYSTOLIC BLOOD PRESSURE: 188 MMHG | WEIGHT: 128 LBS | OXYGEN SATURATION: 100 % | HEIGHT: 68 IN | RESPIRATION RATE: 16 BRPM | BODY MASS INDEX: 19.4 KG/M2

## 2022-04-12 DIAGNOSIS — R91.1 PULMONARY NODULE, LEFT: Primary | ICD-10-CM

## 2022-04-12 PROCEDURE — 99213 OFFICE O/P EST LOW 20 MIN: CPT | Performed by: INTERNAL MEDICINE

## 2022-04-12 RX ORDER — LISINOPRIL 40 MG/1
TABLET ORAL
Qty: 90 TABLET | Refills: 0 | Status: SHIPPED | OUTPATIENT
Start: 2022-04-12 | End: 2022-08-12

## 2022-04-12 NOTE — PROGRESS NOTES
[facility] Pulmonary and Critical Care    Outpatient Follow Up Note    Subjective:   CHIEF COMPLAINT / HPI:     The patient is 66 y.o. male who presents today for COPD, and lung cancer. Gurinder Rosales came in because the screening CT I did last visit showed a new nodule and we had to discuss a plan. Gurinder Rosales has been breathing pretty well for the past year and a half since I saw him. He continues to remain active and complains that he is not been able to gain any weight. He has not had a follow-up CT scan in almost 2 years. He noticed a bump on his back that his daughter said felt like an egg so he was worried and made appointment to come in. Previous history:  Gurinedr Rosales is doing pretty well, however, he was admitted to [place] in December for shortness of breath from new systolic heart failure. He had a CTPA which was negative for clot and showed a stable esophageal lymph node. He had an echo showeing an EF of 40-45%. He's now on lasix and coreg. Previous history: Since last time I saw Mr. Bishnu Harkins in the office I performed a bronchoscopy with intent to do ebus however he had complete obstruction of his right upper lobe bronchus required debulking and biopsy and eventually stenting of the airway by Dr. Ryan Roa. Patient's lung biopsies came back with squamous cell carcinoma. Following the stent placement which blocks off his right upper lobe Mr. Bishnu Harkins developed an asymptomatic right sided pneumothorax with complete collapse of the right lung. He was admitted to the hospital and we placed a small bore chest tube which evacuated the pneumothorax and then the chest tube fell out a day and a half later without resolution of the pneumothorax is in the hospital.  However, when he went to his next CT simulation for radiation oncology 4 days later the pneumothorax had recurred.   Instead of admitting him this time because he was again asymptomatic, I sent him to interventional radiology where a new small bore chest tube was placed and attached to a pneumo stat. He's had a small chest tube in for 2 weeks without recurrence of the pneumothorax thus far. He's feeling pretty well today and had radiation therapy earlier today. He's concerned because the tubing on his chest tube is getting longer and he scraped the chest tube is coming out. He does not want to have a third one placed.      Past Medical History:    Past Medical History:   Diagnosis Date    Allergic rhinitis, cause unspecified 7/15/2010    [facility] ([facility] [address]     lung    Colon polyps     Diabetes mellitus ([facility] [address]     Essential hypertension, benign 7/15/2010    HYPERCHOLESTERAEMIA     Hypertension     Lipoma of other specified sites 7/15/2010    Other abnormal blood chemistry 7/15/2010    Other and unspecified hyperlipidemia 7/15/2010    Other symptoms involving cardiovascular system 7/15/2010    Psychosexual dysfunction with inhibited sexual excitement 7/15/2010       Social History:    Social History     Tobacco Use   Smoking Status Former Smoker    Packs/day: 0.50    Years: 40.00    Pack years: 20.00    Types: Cigarettes    Start date: 1965   [place]u Quit date: 2017    Years since quittin.9   Smokeless Tobacco Never Used   Tobacco Comment    To try gum or patch to start quiting classes        Current Medications:  Current Outpatient Medications on File Prior to Visit   Medication Sig Dispense Refill    amLODIPine (NORVASC) 10 MG tablet TAKE 1 TABLET BY MOUTH EVERY DAY 30 tablet 3    albuterol sulfate  (90 Base) MCG/ACT inhaler INHALE 2 PUFFS BY MOUTH EVERY 6 HOURS AS NEEDED FOR WHEEZE 18 each 3    furosemide (LASIX) 20 MG tablet TAKE 1 TABLET BY MOUTH EVERY DAY 90 tablet 1    rosuvastatin (CRESTOR) 20 MG tablet TAKE 1 TABLET BY MOUTH EVERY DAY 30 tablet 3    carvedilol (COREG) 25 MG tablet TAKE 1 TABLET BY MOUTH TWICE A DAY WITH MEALS 180 tablet 1    dapagliflozin (FARXIGA) 10 MG tablet Take 1 tablet by mouth every morning 90 tablet 1    insulin glargine (LANTUS SOLOSTAR) 100 UNIT/ML injection pen Inject 22 Units into the skin nightly 5 pen 3    omeprazole (PRILOSEC) 20 MG delayed release capsule Take 20 mg by mouth as needed       aspirin 81 MG tablet Take 81 mg by mouth daily       No current facility-administered medications on file prior to visit. REVIEW OF SYSTEMS:    CONSTITUTIONAL: Negative for fevers and chills  HEENT: Negative for oropharyngeal exudate, post nasal drip, sinus pain / pressure, nasal congestion, ear pain  RESPIRATORY:  See HPI  CARDIOVASCULAR: Negative for chest pain, palpitations, edema  GASTROINTESTINAL: Negative for nausea, vomiting, diarrhea, constipation and abdominal pain  HEMATOLOGICAL: Negative for adenopathy  SKIN: Negative for clubbing, cyanosis, skin lesions  EXTREMITIES: Negative for weakness, decreased ROM  NEUROLOGICAL: Negative for unilateral weakness, speech or gait abnormalities  PSYCH: Negative for anxiety, depression    Objective:   PHYSICAL EXAM:        VITALS:  BP (!) 188/99   Pulse 87   Temp 96.6 °F (35.9 °C) (Infrared)   Resp 16   Ht 5' 8\" (1.727 m)   Wt 128 lb (58.1 kg)   SpO2 100%   BMI 19.46 kg/m²     CONSTITUTIONAL:  Awake, alert, cooperative, no apparent distress, and appears stated age  HEENT: No oropharyngeal exudate, PERRL, no cervical adenopathy, no tracheal deviation, thyroid size normal  LUNGS:  No increased work of breathing and clear to auscultation, no crackles. No wheezing. Transmitted breath sounds in RUL  CARDIOVASCULAR:  normal S1 and S2 and no JVD  ABDOMEN:  Normal bowel sounds, non-distended and non-tender to palpation  EXT: No edema, no calf tenderness. Pulses are present bilaterally. NEUROLOGIC:  Mental Status Exam:  Level of Alertness:   awake  Orientation:   person, place, time.   SKIN:  normal skin color, texture, turgor, no redness, warmth, or swelling, with the exception of the area around his right scapula which has small areas of erythema and hyperpigmentation. DATA:      Radiology Review:  Pertinent images / reports were reviewed as a part of this visit. Chest x-ray reveals the following:     Post procedural chest film shows the small bore chest tube   extending to the lung apex with virtually complete reexpansion of   the right lung following manual evacuation of pneumothorax. Consolidated lung in the right upper chest is consistent with   atelectasis. Trachea is midline.       Impression:       Chest tube placement with reexpansion of the right lung         3/18  No change since 1/24/2018, with extensive atelectasis in the right   lung and soft tissue thickening in the right hilar region. 10/18  Impression       1.  Posttherapeutic changes reidentified in the right upper lobe including confluent airspace disease posteriorly into the superior segment of the lower lobe, similar to the previous study. There are slightly increased groundglass and reticular opacities    anteriorly in the upper lobe, which are nonspecific but could represent ongoing fibrosis versus infection or inflammation. Attention on follow-up.       2.  The small amount of loculated right apical pleural air on the previous study has been replaced with a small amount of pleural fluid.       3.  Borderline sized right paraesophageal lymph node, increased from previous study. 3/2019  Impression       1. Posttherapy changes noted with consolidation and bronchiectasis right apex.         No evidence recurrent or residual neoplasm.       Subcentimeter pretracheal lymph node unchanged benign. No significant lymphadenopathy.       Coronary artery calcification which is a risk factor for acute coronary syndrome.       Mild compression fracture superior endplate T7 with adjacent bony sclerosis may relate to posttraumatic or osteoporotic compression fracture. Pathologic compression fracture is not excluded. Continued follow-up recommended.  Findings present previously 9/2019  Impression       Consolidation/atelectasis and volume loss in the right thorax without change.       Chronic pancreatitis.       Stable small left pulmonary nodules. Last PFTs: CONCLUSION:  Moderate obstructive defect without bronchodilator  response. Air trapping present and moderate decrease in diffusion. These findings are most consistent with the diagnosis of COPD. Assessment: This is a 66 y.o. 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 Has a rescue inhaler. Lung cancer: Initially diagnosed 5 years ago with a tumor obstructing his right upper lobe. He'd been getting serial imaging until the pandemic and so I reordered a scan last visit and unfortunately it showed a new, suspicious nodule. I reviewed his scan with he and his daughter today. Will get a navigational high resolution scan and plan to biopsy his lesion. He isn't on blood thinners but does take an aspirin. How aspirin affects the yield from a navigational bronchoscopy is unclear, so I did ask him to stop the aspirin in anticipation of the bronchoscopy. Diagnosis Orders   1. Pulmonary nodule, left  CT CHEST HIGH RESOLUTION        The patient is not currently smoking. Recommend maintaining a smoke-free lifestyle. RTC a week after the bronchoscopy w/ MD. Call or RTC sooner if symptoms persist or worsen acutely.       John Vu MD

## 2022-04-12 NOTE — Clinical Note
Kimberly Ricks,  I'm wondering if this patient has used up his lifetime allowance of radiation as he may have a new primary or contralateral recurrence.     Lan Leslie

## 2022-04-15 ENCOUNTER — TELEPHONE (OUTPATIENT)
Dept: PULMONOLOGY | Age: 72
End: 2022-04-15

## 2022-04-15 NOTE — TELEPHONE ENCOUNTER
Patient has been set up for a  Navigational Bronchoscopy  Where: [facility]   Day:   Arrive: Procedure Time: 1. Stop ALL blood thinners 5 days prior to your procedure   2. Nothing to eat or drink after midnight prior to your procedure  3. Arrive 1.5 hours before BRONCHOSCOPY procedure   4. Sign in with Admitting/Registration at the Main Entrance   5. Please have transportation arrangements from hospital after you procedure. Pt is aware of all the above and is to call the office if he has any questions.

## 2022-04-15 NOTE — TELEPHONE ENCOUNTER
I spoke to pt and he will be out of town 5/2/2022. Can he wait until the end of the May or beginning of June for this procedure?

## 2022-04-18 NOTE — TELEPHONE ENCOUNTER
He can, but I don't really recommend it. I'm gone the first full week of June. Is he gone for weeks?

## 2022-04-20 NOTE — TELEPHONE ENCOUNTER
Patient has been set up for a  Navigational Bronchoscopy  Where: [facility]   Day: Thursday  Arrive: 7 am   Procedure Time: 9 am     1. Stop ALL blood thinners 5 days prior to your procedure   2. Nothing to eat or drink after midnight prior to your procedure  3. Arrive 1.5 hours before BRONCHOSCOPY procedure   4. Sign in with Admitting/Registration at the Main Entrance   5.  Please have transportation arrangements from hospital after you procedure.         Pt is aware of all the above and is to call the office if he has any questions

## 2022-04-25 ENCOUNTER — HOSPITAL ENCOUNTER (OUTPATIENT)
Dept: CT IMAGING | Age: 72
Discharge: HOME | End: 2022-04-25
Payer: MEDICARE

## 2022-04-25 DIAGNOSIS — R91.1 PULMONARY NODULE, LEFT: ICD-10-CM

## 2022-04-25 PROCEDURE — 71250 CT THORAX DX C-: CPT

## 2022-05-12 ASSESSMENT — ENCOUNTER SYMPTOMS
RESPIRATORY NEGATIVE: 1
GASTROINTESTINAL NEGATIVE: 1

## 2022-05-12 NOTE — PROGRESS NOTES
[address]   Congestive Heart Failure    PrimaryCare Doctor:  Valeri Coelho MD      Chief Complaint:  CHF    History of Present Illness:  Pepe Poe is a 66 y.o. male with PMH COPD, lung cancer, HTN, DM, HLD, HFimpEF (40-45% recovered to 50%) who presents today for CHF f/u. OV 6months ago: no med changes    Today he states that he still feels great, very active, still abl to cut his grass with a push mower. He has a lung bx scheduled for this week. He denies chest pain, dyspnea, palpitations, orthopnea, PND, exertional chest pressure/discomfort, fatigue, early saiety, edema, syncope. Wt stable    ER Visit: No  Recent Hospitalization: No    Baseline Weight: 131-132lb  Wt Readings from Last 3 Encounters:   05/16/22 128 lb (58.1 kg)   04/12/22 128 lb (58.1 kg)   03/14/22 127 lb (57.6 kg)     Baseline BNP:<500    EF: 50%  Cardiac Imaging:Echo 5/18/21:   Summary   Technically difficult and limited examination. Overall, left ventricular systolic function is borderline with LVEF   estimated at 50%. No obvious regional wall motion abnormalities are noted. Aortic valve appears sclerotic but opens adequately. Echo 12/18/2018:  Summary   -Technically difficult examination. Lung Cancer.   -Normal left ventricular cavity size and wall thickness.   -Moderately reduced systolic function with an ejection fraction of 40-45%.  -No obvious regional wall motion abnormalities noted.   -Normal diastolic function.   - Mild mitral regurgitation.   -There is sclerotic nodular thickening of the aortic valve cusps.  -Thickening on the tricuspid annulus.   -Trivial tricuspid regurgitation with a RVSP of 29 mmHg.     Echo 8/8/2018:   Summary   -This is a limited exam to evaluate aortic valve. Patient had a complete   exam 4/12/2018   -Normal global systolic function with an ejection fraction of 55%.    -No obvious regional wall motion abnormalities noted.   -There is sclerotic nodular thickening of the aortic valve cusps without evidence of significant stenosis or regurgitation.     MPI 4/12/18:  Summary   No EKG evidence for ischemia with exercise   Normal LV size and systolic function.   There is normal isotope uptake at stress and rest. There is no evidence of  myocardial ischemia or scar.      Device: No     Activity: at baseline  Can you walk 1-2 blocks or do a moderate amount of house/yard work? Yes    NYHA Class: I     Sodium Restrictions: 3g  Fluid Restrictions: 48-64 oz/day  Sodium and fluid restriction compliance: good    Pt Education: The patient has received education on the following topics: dietary sodium restriction, heart failure medications, the importance of physical activity, symptom management and weight monitoring     Past Medical History:   has a past medical history of Allergic rhinitis, cause unspecified, Cancer ([facility] [address] Colon polyps, Diabetes mellitus ([facility] [address] Essential hypertension, benign, HYPERCHOLESTERAEMIA, Hypertension, Lipoma of other specified sites, Other abnormal blood chemistry, Other and unspecified hyperlipidemia, Other symptoms involving cardiovascular system, and Psychosexual dysfunction with inhibited sexual excitement. Surgical History:   has a past surgical history that includes Colonoscopy (10/31/2007); Colonoscopy (2012); bronchoscopy (Right, 04/27/2017); bronchoscopy (04/24/2017); Inguinal hernia repair (Right, 1975); bronchoscopy (09/12/2017); hernia repair (Left, 08/14/2019); Cystoscopy (N/A, 08/14/2019); Colonoscopy (02/2020); and Colonoscopy (02/06/2020). Social History:   reports that he quit smoking about 5 years ago. His smoking use included cigarettes. He started smoking about 56 years ago. He has a 20.00 pack-year smoking history. He has never used smokeless tobacco. He reports current alcohol use. He reports previous drug use. Drug: Marijuana Curlie Opal).    Family History:   Family History   Problem Relation Age of Onset   [place] Breast Cancer Mother HomeMedications:  Prior to Admission medications    Medication Sig Start Date End Date Taking? Authorizing Provider   lisinopril (PRINIVIL;ZESTRIL) 40 MG tablet TAKE 1 TABLET BY MOUTH EVERY DAY 4/12/22   Valeri Coelho MD   amLODIPine (NORVASC) 10 MG tablet TAKE 1 TABLET BY MOUTH EVERY DAY 3/30/22   Valeri Coelho MD   albuterol sulfate  (90 Base) MCG/ACT inhaler INHALE 2 PUFFS BY MOUTH EVERY 6 HOURS AS NEEDED FOR WHEEZE 3/29/22   Gwyn Simpson MD   furosemide (LASIX) 20 MG tablet TAKE 1 TABLET BY MOUTH EVERY DAY 3/24/22   NIDIA Murillo - CNP   rosuvastatin (CRESTOR) 20 MG tablet TAKE 1 TABLET BY MOUTH EVERY DAY 12/23/21   Bertha Biggs MD   carvedilol (COREG) 25 MG tablet TAKE 1 TABLET BY MOUTH TWICE A DAY WITH MEALS 9/15/21   NIDIA Murillo - CNP   dapagliflozin (FARXIGA) 10 MG tablet Take 1 tablet by mouth every morning 7/21/21 7/16/22  Valeri Coelho MD   insulin glargine (LANTUS SOLOSTAR) 100 UNIT/ML injection pen Inject 22 Units into the skin nightly 5/20/21   Valeri Coelho MD   omeprazole (PRILOSEC) 20 MG delayed release capsule Take 20 mg by mouth as needed     Historical Provider, MD   aspirin 81 MG tablet Take 81 mg by mouth daily    Historical Provider, MD        Allergies:  Metformin and related     ROS:   Review of Systems   Constitutional: Negative. Respiratory: Negative. Cardiovascular: Negative. Gastrointestinal: Negative. Genitourinary: Negative. Musculoskeletal: Negative. Skin: Negative. Neurological: Negative. Hematological: Negative. Psychiatric/Behavioral: Negative. Physical Examination:    Vitals:    05/16/22 0930   BP: (!) 150/82   Site: Left Upper Arm   Position: Sitting   Cuff Size: Medium Adult   Pulse: 78   SpO2: 99%   Weight: 128 lb (58.1 kg)   Height: 5' 8\" (1.727 m)           Physical Exam  Constitutional:       Appearance: He is well-developed. HENT:      Head: Normocephalic and atraumatic.    Eyes:      Pupils: Pupils are equal, round, and reactive to light. Cardiovascular:      Rate and Rhythm: Normal rate and regular rhythm. Pulses: Normal pulses. Heart sounds: Normal heart sounds. Pulmonary:      Effort: Pulmonary effort is normal.      Breath sounds: Normal breath sounds. Abdominal:      Palpations: Abdomen is soft. Musculoskeletal:         General: Normal range of motion. Cervical back: Normal range of motion and neck supple. Skin:     General: Skin is warm and dry. Neurological:      General: No focal deficit present. Mental Status: He is alert and oriented to person, place, and time. Mental status is at baseline. Psychiatric:         Behavior: Behavior normal.         Thought Content:  Thought content normal.         Judgment: Judgment normal.         Lab Data:    CBC:   Lab Results   Component Value Date    WBC 7.1 10/13/2021    WBC 6.5 04/21/2021    WBC 7.0 01/13/2021    RBC 4.46 10/13/2021    RBC 4.61 04/21/2021    RBC 4.49 01/13/2021    RBC 4.24 06/29/2017    RBC 4.04 06/22/2017    RBC 4.43 06/15/2017    HGB 13.6 10/13/2021    HGB 13.9 04/21/2021    HGB 13.5 01/13/2021    HCT 40.0 10/13/2021    HCT 41.6 04/21/2021    HCT 39.7 01/13/2021    MCV 89.8 10/13/2021    MCV 90.2 04/21/2021    MCV 88.3 01/13/2021    RDW 13.0 10/13/2021    RDW 14.1 04/21/2021    RDW 13.5 01/13/2021     10/13/2021     04/21/2021     01/13/2021     BMP:  Lab Results   Component Value Date     10/13/2021     04/16/2021     01/13/2021    K 5.6 10/13/2021    K 5.0 04/16/2021    K 4.1 01/13/2021    K 3.9 12/19/2018    K 3.9 04/13/2018     10/13/2021     04/16/2021     01/13/2021    CO2 22 10/13/2021    CO2 26 04/16/2021    CO2 24 01/13/2021    PHOS 2.5 05/04/2017    BUN 17 10/13/2021    BUN 19 04/16/2021    BUN 15 01/13/2021    CREATININE 1.1 10/13/2021    CREATININE 1.0 04/16/2021    CREATININE 0.9 01/13/2021     BNP:   Lab Results   Component Value Date    PROBNP 149 05/21/2020    PROBNP 205 09/11/2019    PROBNP 575 03/13/2019     Iron Studies:  No components found for: FE,  TIBC,  FERRITIN  Iron Deficiency Anemia:  No    IV Iron Therapy:  No  2017 ACC/AHA HF Guidelines:   intravenous iron replacement in patients with New York Heart Association (NYHA) class II and III HF and iron deficiency (ferritin <100 ng/ml or 100-300 ng/ml if transferrin saturation <20%), to improve functional status and QoL. Assessment/Plan:    Encounter Diagnoses   Name     Dilated cardiomyopathy ([facility] [address] On GDT, EF recovered    Essential hypertension Elevated today, just had meds    Chronic combined systolic and diastolic congestive heart failure ([facility] [address] Compensated, labs this week              Instructions:   1. Medications:continue current medications  2. Labs with biopsy  3. Lifestyle Recommendations: Weigh yourself every day in the morning after urination, call Polo Francisco if wt increases 2-3lb in one day or 5lb in one week, Limit sodium to 2000mg/day and fluids to 2L or 64oz/day. 4. Follow up: 6 months         [facility]: [phone]       I appreciate the opportunity of cooperating in the care of this individual.    Palma Bernal, NIDIA - CNP, CNP, 5/12/2022,3:18 PM    QUALITY MEASURES  1. Tobacco Cessation Counseling: NA  2. Retake of BP if >140/90:   NA  3. Documentation to PCP/referring for new patient:  Sent to PCP at close of office visit  4. CAD patient on anti-platelet: NA  5. CAD patient on STATIN therapy:  NA  6. Patient with CHF and aFib on anticoagulation:  NA   7. Patient Education:NA   8. BB for LVSD :  Yes   9. ACE/ARB for LVSD:  Yes   10.  Left Ventricular Ejection Fraction (LVEF) Assessment:  Yes

## 2022-05-16 ENCOUNTER — OFFICE VISIT (OUTPATIENT)
Dept: CARDIOLOGY CLINIC | Age: 72
End: 2022-05-16
Payer: MEDICARE

## 2022-05-16 VITALS
HEART RATE: 78 BPM | SYSTOLIC BLOOD PRESSURE: 150 MMHG | DIASTOLIC BLOOD PRESSURE: 82 MMHG | OXYGEN SATURATION: 99 % | BODY MASS INDEX: 19.4 KG/M2 | HEIGHT: 68 IN | WEIGHT: 128 LBS

## 2022-05-16 DIAGNOSIS — I50.42 CHRONIC COMBINED SYSTOLIC AND DIASTOLIC CONGESTIVE HEART FAILURE (HCC): Primary | ICD-10-CM

## 2022-05-16 DIAGNOSIS — I42.0 DILATED CARDIOMYOPATHY (HCC): ICD-10-CM

## 2022-05-16 DIAGNOSIS — I10 ESSENTIAL HYPERTENSION: ICD-10-CM

## 2022-05-16 PROCEDURE — 99214 OFFICE O/P EST MOD 30 MIN: CPT | Performed by: NURSE PRACTITIONER

## 2022-05-16 RX ORDER — ASPIRIN 81 MG/1
81 TABLET ORAL DAILY
Qty: 90 TABLET | Refills: 1 | OUTPATIENT
Start: 2022-05-16

## 2022-05-16 NOTE — PATIENT INSTRUCTIONS
Instructions:   1. Medications:continue current medications  2. Labs with biopsy  3. Lifestyle Recommendations: Weigh yourself every day in the morning after urination, call Cain Collins if wt increases 2-3lb in one day or 5lb in one week, Limit sodium to 2000mg/day and fluids to 2L or 64oz/day.     4. Follow up: 6 months         [facility]: [phone]

## 2022-05-18 ENCOUNTER — ANESTHESIA EVENT (OUTPATIENT)
Dept: ENDOSCOPY | Age: 72
End: 2022-05-18
Payer: MEDICARE

## 2022-05-19 ENCOUNTER — APPOINTMENT (OUTPATIENT)
Dept: GENERAL RADIOLOGY | Age: 72
End: 2022-05-19
Attending: INTERNAL MEDICINE
Payer: MEDICARE

## 2022-05-19 ENCOUNTER — ANESTHESIA (OUTPATIENT)
Dept: ENDOSCOPY | Age: 72
End: 2022-05-19
Payer: MEDICARE

## 2022-05-19 ENCOUNTER — HOSPITAL ENCOUNTER (OUTPATIENT)
Age: 72
Setting detail: OUTPATIENT SURGERY
Discharge: HOME | End: 2022-05-19
Attending: INTERNAL MEDICINE | Admitting: INTERNAL MEDICINE
Payer: MEDICARE

## 2022-05-19 VITALS
TEMPERATURE: 96.5 F | SYSTOLIC BLOOD PRESSURE: 145 MMHG | RESPIRATION RATE: 16 BRPM | DIASTOLIC BLOOD PRESSURE: 81 MMHG | HEIGHT: 68 IN | OXYGEN SATURATION: 100 % | BODY MASS INDEX: 18.94 KG/M2 | HEART RATE: 72 BPM | WEIGHT: 125 LBS

## 2022-05-19 DIAGNOSIS — R91.1 PULMONARY NODULE, LEFT: ICD-10-CM

## 2022-05-19 LAB
GLUCOSE BLD-MCNC: 174 MG/DL (ref 70–99)
GLUCOSE BLD-MCNC: 183 MG/DL (ref 70–99)
GLUCOSE BLD-MCNC: 191 MG/DL (ref 70–99)
PERFORMED ON: ABNORMAL

## 2022-05-19 PROCEDURE — 3209999900 FLUORO FOR SURGICAL PROCEDURES

## 2022-05-19 PROCEDURE — 2709999900 HC NON-CHARGEABLE SUPPLY: Performed by: INTERNAL MEDICINE

## 2022-05-19 PROCEDURE — 88172 CYTP DX EVAL FNA 1ST EA SITE: CPT

## 2022-05-19 PROCEDURE — 2580000003 HC RX 258: Performed by: ANESTHESIOLOGY

## 2022-05-19 PROCEDURE — 31623 DX BRONCHOSCOPE/BRUSH: CPT | Performed by: INTERNAL MEDICINE

## 2022-05-19 PROCEDURE — 88341 IMHCHEM/IMCYTCHM EA ADD ANTB: CPT

## 2022-05-19 PROCEDURE — 88305 TISSUE EXAM BY PATHOLOGIST: CPT

## 2022-05-19 PROCEDURE — 31629 BRONCHOSCOPY/NEEDLE BX EACH: CPT | Performed by: INTERNAL MEDICINE

## 2022-05-19 PROCEDURE — 88104 CYTOPATH FL NONGYN SMEARS: CPT

## 2022-05-19 PROCEDURE — 7100000011 HC PHASE II RECOVERY - ADDTL 15 MIN: Performed by: INTERNAL MEDICINE

## 2022-05-19 PROCEDURE — 7100000010 HC PHASE II RECOVERY - FIRST 15 MIN: Performed by: INTERNAL MEDICINE

## 2022-05-19 PROCEDURE — 3700000001 HC ADD 15 MINUTES (ANESTHESIA): Performed by: INTERNAL MEDICINE

## 2022-05-19 PROCEDURE — 2580000003 HC RX 258: Performed by: NURSE ANESTHETIST, CERTIFIED REGISTERED

## 2022-05-19 PROCEDURE — 31654 BRONCH EBUS IVNTJ PERPH LES: CPT | Performed by: INTERNAL MEDICINE

## 2022-05-19 PROCEDURE — 6360000002 HC RX W HCPCS: Performed by: NURSE ANESTHETIST, CERTIFIED REGISTERED

## 2022-05-19 PROCEDURE — 7100000001 HC PACU RECOVERY - ADDTL 15 MIN: Performed by: INTERNAL MEDICINE

## 2022-05-19 PROCEDURE — 88342 IMHCHEM/IMCYTCHM 1ST ANTB: CPT

## 2022-05-19 PROCEDURE — 3609011800 HC BRONCHOSCOPY/TRANSBRONCHIAL LUNG BIOPSY: Performed by: INTERNAL MEDICINE

## 2022-05-19 PROCEDURE — 7100000000 HC PACU RECOVERY - FIRST 15 MIN: Performed by: INTERNAL MEDICINE

## 2022-05-19 PROCEDURE — 31628 BRONCHOSCOPY/LUNG BX EACH: CPT | Performed by: INTERNAL MEDICINE

## 2022-05-19 PROCEDURE — 3700000000 HC ANESTHESIA ATTENDED CARE: Performed by: INTERNAL MEDICINE

## 2022-05-19 PROCEDURE — 71045 X-RAY EXAM CHEST 1 VIEW: CPT

## 2022-05-19 PROCEDURE — 2720000010 HC SURG SUPPLY STERILE: Performed by: INTERNAL MEDICINE

## 2022-05-19 PROCEDURE — 31645 BRNCHSC W/THER ASPIR 1ST: CPT | Performed by: INTERNAL MEDICINE

## 2022-05-19 PROCEDURE — 3609027000 HC BRONCHOSCOPY: Performed by: INTERNAL MEDICINE

## 2022-05-19 PROCEDURE — 2500000003 HC RX 250 WO HCPCS: Performed by: NURSE ANESTHETIST, CERTIFIED REGISTERED

## 2022-05-19 PROCEDURE — 31627 NAVIGATIONAL BRONCHOSCOPY: CPT | Performed by: INTERNAL MEDICINE

## 2022-05-19 PROCEDURE — 88173 CYTOPATH EVAL FNA REPORT: CPT

## 2022-05-19 PROCEDURE — 88177 CYTP FNA EVAL EA ADDL: CPT

## 2022-05-19 RX ORDER — OXYCODONE HYDROCHLORIDE 5 MG/1
5 TABLET ORAL
Status: CANCELLED | OUTPATIENT
Start: 2022-05-19 | End: 2022-05-19

## 2022-05-19 RX ORDER — MEPERIDINE HYDROCHLORIDE 25 MG/ML
12.5 INJECTION INTRAMUSCULAR; INTRAVENOUS; SUBCUTANEOUS EVERY 5 MIN PRN
Status: CANCELLED | OUTPATIENT
Start: 2022-05-19

## 2022-05-19 RX ORDER — FENTANYL CITRATE 50 UG/ML
50 INJECTION, SOLUTION INTRAMUSCULAR; INTRAVENOUS EVERY 5 MIN PRN
Status: CANCELLED | OUTPATIENT
Start: 2022-05-19

## 2022-05-19 RX ORDER — SODIUM CHLORIDE 9 MG/ML
INJECTION, SOLUTION INTRAVENOUS PRN
Status: CANCELLED | OUTPATIENT
Start: 2022-05-19

## 2022-05-19 RX ORDER — ONDANSETRON 2 MG/ML
INJECTION INTRAMUSCULAR; INTRAVENOUS PRN
Status: DISCONTINUED | OUTPATIENT
Start: 2022-05-19 | End: 2022-05-19 | Stop reason: SDUPTHER

## 2022-05-19 RX ORDER — HYDRALAZINE HYDROCHLORIDE 20 MG/ML
10 INJECTION INTRAMUSCULAR; INTRAVENOUS
Status: CANCELLED | OUTPATIENT
Start: 2022-05-19

## 2022-05-19 RX ORDER — SODIUM CHLORIDE 0.9 % (FLUSH) 0.9 %
5-40 SYRINGE (ML) INJECTION PRN
Status: CANCELLED | OUTPATIENT
Start: 2022-05-19

## 2022-05-19 RX ORDER — FENTANYL CITRATE 50 UG/ML
INJECTION, SOLUTION INTRAMUSCULAR; INTRAVENOUS PRN
Status: DISCONTINUED | OUTPATIENT
Start: 2022-05-19 | End: 2022-05-19 | Stop reason: SDUPTHER

## 2022-05-19 RX ORDER — LIDOCAINE HYDROCHLORIDE 20 MG/ML
INJECTION, SOLUTION INFILTRATION; PERINEURAL PRN
Status: DISCONTINUED | OUTPATIENT
Start: 2022-05-19 | End: 2022-05-19 | Stop reason: SDUPTHER

## 2022-05-19 RX ORDER — ROCURONIUM BROMIDE 10 MG/ML
INJECTION, SOLUTION INTRAVENOUS PRN
Status: DISCONTINUED | OUTPATIENT
Start: 2022-05-19 | End: 2022-05-19 | Stop reason: SDUPTHER

## 2022-05-19 RX ORDER — LABETALOL HYDROCHLORIDE 5 MG/ML
10 INJECTION, SOLUTION INTRAVENOUS
Status: CANCELLED | OUTPATIENT
Start: 2022-05-19

## 2022-05-19 RX ORDER — DEXAMETHASONE SODIUM PHOSPHATE 4 MG/ML
INJECTION, SOLUTION INTRA-ARTICULAR; INTRALESIONAL; INTRAMUSCULAR; INTRAVENOUS; SOFT TISSUE PRN
Status: DISCONTINUED | OUTPATIENT
Start: 2022-05-19 | End: 2022-05-19 | Stop reason: SDUPTHER

## 2022-05-19 RX ORDER — ONDANSETRON 2 MG/ML
4 INJECTION INTRAMUSCULAR; INTRAVENOUS
Status: CANCELLED | OUTPATIENT
Start: 2022-05-19 | End: 2022-05-19

## 2022-05-19 RX ORDER — SODIUM CHLORIDE 9 MG/ML
INJECTION, SOLUTION INTRAVENOUS PRN
Status: DISCONTINUED | OUTPATIENT
Start: 2022-05-19 | End: 2022-05-19 | Stop reason: HOSPADM

## 2022-05-19 RX ORDER — FENTANYL CITRATE 50 UG/ML
25 INJECTION, SOLUTION INTRAMUSCULAR; INTRAVENOUS EVERY 5 MIN PRN
Status: CANCELLED | OUTPATIENT
Start: 2022-05-19

## 2022-05-19 RX ORDER — SODIUM CHLORIDE 0.9 % (FLUSH) 0.9 %
5-40 SYRINGE (ML) INJECTION EVERY 12 HOURS SCHEDULED
Status: DISCONTINUED | OUTPATIENT
Start: 2022-05-19 | End: 2022-05-19 | Stop reason: HOSPADM

## 2022-05-19 RX ORDER — SODIUM CHLORIDE 0.9 % (FLUSH) 0.9 %
5-40 SYRINGE (ML) INJECTION PRN
Status: DISCONTINUED | OUTPATIENT
Start: 2022-05-19 | End: 2022-05-19 | Stop reason: HOSPADM

## 2022-05-19 RX ORDER — SODIUM CHLORIDE 0.9 % (FLUSH) 0.9 %
5-40 SYRINGE (ML) INJECTION EVERY 12 HOURS SCHEDULED
Status: CANCELLED | OUTPATIENT
Start: 2022-05-19

## 2022-05-19 RX ORDER — PROPOFOL 10 MG/ML
INJECTION, EMULSION INTRAVENOUS PRN
Status: DISCONTINUED | OUTPATIENT
Start: 2022-05-19 | End: 2022-05-19 | Stop reason: SDUPTHER

## 2022-05-19 RX ORDER — MIDAZOLAM HYDROCHLORIDE 1 MG/ML
INJECTION INTRAMUSCULAR; INTRAVENOUS PRN
Status: DISCONTINUED | OUTPATIENT
Start: 2022-05-19 | End: 2022-05-19 | Stop reason: SDUPTHER

## 2022-05-19 RX ORDER — GLIPIZIDE 10 MG/1
10 TABLET ORAL
COMMUNITY
Start: 2022-03-14

## 2022-05-19 RX ORDER — GLYCOPYRROLATE 0.2 MG/ML
INJECTION INTRAMUSCULAR; INTRAVENOUS PRN
Status: DISCONTINUED | OUTPATIENT
Start: 2022-05-19 | End: 2022-05-19 | Stop reason: SDUPTHER

## 2022-05-19 RX ORDER — SODIUM CHLORIDE, SODIUM LACTATE, POTASSIUM CHLORIDE, CALCIUM CHLORIDE 600; 310; 30; 20 MG/100ML; MG/100ML; MG/100ML; MG/100ML
INJECTION, SOLUTION INTRAVENOUS CONTINUOUS
Status: DISCONTINUED | OUTPATIENT
Start: 2022-05-19 | End: 2022-05-19 | Stop reason: HOSPADM

## 2022-05-19 RX ADMIN — PHENYLEPHRINE HYDROCHLORIDE 200 MCG: 10 INJECTION, SOLUTION INTRAMUSCULAR; INTRAVENOUS; SUBCUTANEOUS at 10:00

## 2022-05-19 RX ADMIN — FENTANYL CITRATE 100 MCG: 50 INJECTION, SOLUTION INTRAMUSCULAR; INTRAVENOUS at 09:11

## 2022-05-19 RX ADMIN — GLYCOPYRROLATE 0.2 MG: 0.2 INJECTION INTRAMUSCULAR; INTRAVENOUS at 09:15

## 2022-05-19 RX ADMIN — PHENYLEPHRINE HYDROCHLORIDE 300 MCG: 10 INJECTION, SOLUTION INTRAMUSCULAR; INTRAVENOUS; SUBCUTANEOUS at 09:41

## 2022-05-19 RX ADMIN — ROCURONIUM BROMIDE 20 MG: 10 INJECTION INTRAVENOUS at 09:28

## 2022-05-19 RX ADMIN — MIDAZOLAM HYDROCHLORIDE 2 MG: 2 INJECTION, SOLUTION INTRAMUSCULAR; INTRAVENOUS at 09:00

## 2022-05-19 RX ADMIN — ROCURONIUM BROMIDE 20 MG: 10 INJECTION INTRAVENOUS at 11:00

## 2022-05-19 RX ADMIN — PROPOFOL 200 MG: 10 INJECTION, EMULSION INTRAVENOUS at 09:11

## 2022-05-19 RX ADMIN — PHENYLEPHRINE HYDROCHLORIDE 100 MCG/MIN: 10 INJECTION INTRAVENOUS at 10:00

## 2022-05-19 RX ADMIN — ONDANSETRON 4 MG: 2 INJECTION INTRAMUSCULAR; INTRAVENOUS at 09:20

## 2022-05-19 RX ADMIN — ROCURONIUM BROMIDE 50 MG: 10 INJECTION INTRAVENOUS at 09:11

## 2022-05-19 RX ADMIN — PHENYLEPHRINE HYDROCHLORIDE 200 MCG: 10 INJECTION, SOLUTION INTRAMUSCULAR; INTRAVENOUS; SUBCUTANEOUS at 09:32

## 2022-05-19 RX ADMIN — SUGAMMADEX 200 MG: 100 INJECTION, SOLUTION INTRAVENOUS at 11:27

## 2022-05-19 RX ADMIN — SODIUM CHLORIDE, POTASSIUM CHLORIDE, SODIUM LACTATE AND CALCIUM CHLORIDE: 600; 310; 30; 20 INJECTION, SOLUTION INTRAVENOUS at 07:52

## 2022-05-19 RX ADMIN — LIDOCAINE HYDROCHLORIDE 50 MG: 20 INJECTION, SOLUTION INFILTRATION; PERINEURAL at 09:11

## 2022-05-19 RX ADMIN — PHENYLEPHRINE HYDROCHLORIDE 100 MCG: 10 INJECTION, SOLUTION INTRAMUSCULAR; INTRAVENOUS; SUBCUTANEOUS at 09:16

## 2022-05-19 RX ADMIN — PHENYLEPHRINE HYDROCHLORIDE 100 MCG: 10 INJECTION, SOLUTION INTRAMUSCULAR; INTRAVENOUS; SUBCUTANEOUS at 09:25

## 2022-05-19 RX ADMIN — PHENYLEPHRINE HYDROCHLORIDE 200 MCG: 10 INJECTION, SOLUTION INTRAMUSCULAR; INTRAVENOUS; SUBCUTANEOUS at 09:17

## 2022-05-19 RX ADMIN — DEXAMETHASONE SODIUM PHOSPHATE 4 MG: 4 INJECTION, SOLUTION INTRAMUSCULAR; INTRAVENOUS at 09:20

## 2022-05-19 ASSESSMENT — PAIN - FUNCTIONAL ASSESSMENT
PAIN_FUNCTIONAL_ASSESSMENT: 0-10
PAIN_FUNCTIONAL_ASSESSMENT: 0-10

## 2022-05-19 ASSESSMENT — PAIN SCALES - GENERAL: PAINLEVEL_OUTOF10: 0

## 2022-05-19 NOTE — PROGRESS NOTES
PACU Transfer to \[facility]\""    Vitals:    05/19/22 1256   BP: (!) 145/81   Pulse: 72   Resp: 16   Temp: 96.5 °F (35.8 °C)   SpO2: 100%         Intake/Output Summary (Last 24 hours) at 5/19/2022 1258  Last data filed at 5/19/2022 1132  Gross per 24 hour   Intake 900 ml   Output --   Net 900 ml       Pain assessment: Pain Level: 0    Patient transferred to care of \[facility]\"" RN.    5/19/2022 12:58 PM

## 2022-05-19 NOTE — PROCEDURES
PROCEDURE: Robotic navigational bronchoscopy with fluoroscopic and radial probe ultrasound-guided biopsy of peripheral pulmonary nodule    Diagnosis: SHERLYN lung nodule with history of lung cancer. ASA CLAss    II. Mild systemic disease       Mallampati score:  1    Sedation plan:  General          DESCRIPTION OF PROCEDURE: Informed consent was obtained from the patient after explaining the risks and benefits. A time out was taken. Total intravenous anesthesia was kindly provided by the anesthetist.      Using an endotracheal tube a therapeutic bronchoscope was used to perform a complete airway inspection. The trachea appeared normal.  The bronchial trees were examined to the subsegmental level. The RUL was scarred and occluded. There were thick secretions in the RUL bronchus and some scattered plugs in the lower lobes bilaterally. Therapeutic aspiration was performed. Next the [facility] navigational bronchoscope by Gege Marley was used to navigate to the peripheral nodule which was in the SHERLYN. At the 4th generation airway there appeared to be narrowing and occlusion in the region where the anticipated lesion was located. When nodule was identified as being in range of the tip of the scope I paused the robot to lock it into position. I then used the radial probe ultrasound and it confirmed I was in the correct llocation with a concentric lesion. I then I used fluoroscopy to visualize my biopsy with a transbronchial needle. Radial probe ultrasound imaging again showed that I was within the lesion. The lesion was concentric.  I then transitioned to a transbronchial brush for biopsy and alternated between transbronchial needle and forceps biopsies.    -  Transbronchial needle biopsies were obtained in the SHERLYN  -  Transbronchial brushings were obtained for biopsy in the SHERLYN  -  Transbronchial forceps biopsies were obtained from SHERLYN     Pathology pending  EBL: Minimal, about 10mL    The patient tolerated the procedure well. Recovery will be per the anesthesia team.      FOLLOW UP:  is with me in approximately three to five days. Patient is instructed to call with concerns and if follow up has not already been scheduled. In the event of severe symptoms or if the patient is unable to reach my office, instructions are given to proceed to the emergency department.      Faisal Reynolds MD

## 2022-05-19 NOTE — H&P
History and Physical / Pre-Sedation Assessment    Patient:  Gibran Donahue   :   1950     Intended Procedure:  Robotic and radial probe ultrasound guided bronchoscopy for transbronchial biopsies. HPI: 66 y.o. male with history of lung cancer with new suspicious pulmonary nodule    Past Medical History:      Diagnosis Date    Allergic rhinitis, cause unspecified 7/15/2010    Cancer ([facility] [address]     lung    Colon polyps     Diabetes mellitus ([facility] [address]     Essential hypertension, benign 7/15/2010    HYPERCHOLESTERAEMIA     Hypertension     Lipoma of other specified sites 7/15/2010    Other abnormal blood chemistry 7/15/2010    Other and unspecified hyperlipidemia 7/15/2010    Other symptoms involving cardiovascular system 7/15/2010    Psychosexual dysfunction with inhibited sexual excitement 7/15/2010      Past Surgical History:        Procedure Laterality Date    BRONCHOSCOPY Right 2017    Dr. Darius Scott - w/R BI stent placement size 12x3    BRONCHOSCOPY  2017    FAVIO Aguilar - w/EBUS    BRONCHOSCOPY  2017    COLONOSCOPY  10/31/2007    polyps    COLONOSCOPY      polyp- repeat     COLONOSCOPY  2020    polyp Dr Syed Randle repeat      COLONOSCOPY  2020    Dr. Julia Dean, polyps removed, repeat 2023    CYSTOSCOPY N/A 2019    CYSTOSCOPY TRANSURETHRAL RESECTION BLADDER performed by Maribel Linton MD at [address] 2019    OPEN LEFT INGUINAL HERNIA REPAIR WITH MESH performed by Jazmine Hardy MD at [address] [place] [address] Right 1975       Social History:    TOBACCO:   reports that he quit smoking about 5 years ago. His smoking use included cigarettes. He started smoking about 56 years ago. He has a 20.00 pack-year smoking history. He has never used smokeless tobacco.  ETOH:   reports current alcohol use. Family History:       Problem Relation Age of Onset    Breast Cancer Mother          Allergies:    Allergies

## 2022-05-19 NOTE — PROGRESS NOTES
Ambulatory Surgery/Procedure Discharge Note    Pt tolerated procedure well. Pt reports only a slight sore throat from intubation. Mild non productive cough noted at time of discharge. Discharge instructions reviewed with pt, wife and daughter. Written instructions provided at discharge. Blood pressure within 20% of preanesthesia reading. Vitals:    05/19/22 1256   BP: (!) 145/81   Pulse: 72   Resp: 16   Temp: 96.5 °F (35.8 °C)   SpO2: 100%       In: 1020 [P.O.:120; I.V.:900]  Out: -     Restroom use offered before discharge. Yes    Pain assessment:  none  Pain Level: 0        Patient discharged to home/self care.  Patient discharged via wheel chair by transporter to waiting family/S.O.       5/19/2022 1:30 PM

## 2022-05-19 NOTE — ANESTHESIA POSTPROCEDURE EVALUATION
Department of Anesthesiology  Postprocedure Note    Patient: Abbie Tijerina  MRN: [id number]  YOB: 1950  Date of evaluation: 5/19/2022  Time:  1:02 PM     Procedure Summary     Date: 05/19/22 Room / Location: [address] [place] [address]    Anesthesia Start: 0902 Anesthesia Stop: 1148    Procedures:       ROBOTIC NAVIGATIONAL BRONCHOSCOPY WITH TRANSBRONCHIAL BIOPSIES USING FLUOROSCOPY AND RADIAL PROBE ULTRASOUND (N/A )      BRONCHOSCOPY Diagnosis:       Pulmonary nodule, left      (Pulmonary nodule, left [R91.1])    Surgeons: Mosie Snellen, MD Responsible Provider: Mary Jane Ashby MD    Anesthesia Type: general ASA Status: 4          Anesthesia Type: No value filed. Liz Phase I: Liz Score: 10    Liz Phase II:      Last vitals: Reviewed and per EMR flowsheets.        Anesthesia Post Evaluation    Patient location during evaluation: PACU  Patient participation: complete - patient participated  Level of consciousness: awake and alert  Airway patency: patent  Nausea & Vomiting: no nausea and no vomiting  Complications: no  Cardiovascular status: hemodynamically stable  Respiratory status: acceptable  Hydration status: euvolemic  Multimodal analgesia pain management approach

## 2022-05-19 NOTE — PROGRESS NOTES
Blood pressure elevated. Pt has not had his blood pressure medication/beta blocker since Tuesday. Dr. Israel Greco notified at bedside. Anesthesiologist states that pt should take his Coreg now with a sip of water.

## 2022-05-19 NOTE — ANESTHESIA PRE PROCEDURE
Department of Anesthesiology  Preprocedure Note       Name:  Harrison Randhawa   Age:  66 y.o.  :  1950                                          MRN:  [id number]         Date:  2022      Surgeon: Julián Velasquez):  Karla Smith MD    Procedure: Procedure(s):  ROBOTIC NAVIGATIONAL BRONCHOSCOPY WITH TRANSBRONCHIAL BIOPSIES USING FLUOROSCOPY AND RADIAL PROBE ULTRASOUND    Medications prior to admission:   Prior to Admission medications    Medication Sig Start Date End Date Taking?  Authorizing Provider   glipiZIDE (GLUCOTROL) 10 MG tablet Take 10 mg by mouth 3/14/22  Yes Historical Provider, MD   aspirin EC 81 MG EC tablet Take 1 tablet by mouth daily 22   NIDIA Jaime CNP   lisinopril (PRINIVIL;ZESTRIL) 40 MG tablet TAKE 1 TABLET BY MOUTH EVERY DAY 22   Omar Guidry MD   amLODIPine (NORVASC) 10 MG tablet TAKE 1 TABLET BY MOUTH EVERY DAY 3/30/22   Omar Guidry MD   albuterol sulfate  (90 Base) MCG/ACT inhaler INHALE 2 PUFFS BY MOUTH EVERY 6 HOURS AS NEEDED FOR WHEEZE 3/29/22   Xochilt Beltran MD   furosemide (LASIX) 20 MG tablet TAKE 1 TABLET BY MOUTH EVERY DAY 3/24/22   NIDIA Jaime CNP   rosuvastatin (CRESTOR) 20 MG tablet TAKE 1 TABLET BY MOUTH EVERY DAY 21   Zelda Salmon MD   carvedilol (COREG) 25 MG tablet TAKE 1 TABLET BY MOUTH TWICE A DAY WITH MEALS 9/15/21   NIDIA Jaime CNP   dapagliflozin (FARXIGA) 10 MG tablet Take 1 tablet by mouth every morning 21  Omar Guidry MD   insulin glargine (LANTUS SOLOSTAR) 100 UNIT/ML injection pen Inject 22 Units into the skin nightly  Patient taking differently: Inject 7 Units into the skin nightly  21   Omar Guidry MD   omeprazole (PRILOSEC) 20 MG delayed release capsule Take 20 mg by mouth as needed     Historical Provider, MD       Current medications:    Current Facility-Administered Medications   Medication Dose Route Frequency Provider Last Rate Last Admin    lactated ringers infusion IntraVENous Continuous Aurora Martinez DO           Allergies: Allergies   Allergen Reactions    Metformin And Related Diarrhea       Problem List:    Patient Active Problem List   Diagnosis Code    Psychosexual dysfunction with inhibited sexual excitement F52.8    Lipoma of other specified sites D17.79    Allergic rhinitis J30.9    Mixed hyperlipidemia E78.2    Type 2 diabetes mellitus without complication, with long-term current use of insulin (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 [address]     lung    Colon polyps     Diabetes mellitus ([facility] [address]     Essential hypertension, benign 7/15/2010    HYPERCHOLESTERAEMIA     Hypertension     Lipoma of other specified sites 7/15/2010    Other abnormal blood chemistry 7/15/2010    Other and unspecified hyperlipidemia 7/15/2010    Other symptoms involving cardiovascular system 7/15/2010    Psychosexual dysfunction with inhibited sexual excitement 7/15/2010       Past Surgical History:        Procedure Laterality Date    BRONCHOSCOPY Right 04/27/2017    Dr. Neema Lovell - w/R BI stent placement size 12x3    BRONCHOSCOPY  04/24/2017    FAVIO Aguilar - w/EBUS    BRONCHOSCOPY  09/12/2017    COLONOSCOPY  10/31/2007    polyps    COLONOSCOPY  2012    polyp- repeat 2017    COLONOSCOPY  2020    polyp Dr Clarke Able repeat      COLONOSCOPY  2020    Dr. Vaishali Rush, polyps removed, repeat 2023    CYSTOSCOPY N/A 2019    CYSTOSCOPY TRANSURETHRAL RESECTION BLADDER performed by Colette Nuñez MD at [address] [place] Left 2019    OPEN LEFT INGUINAL HERNIA REPAIR WITH MESH performed by Aniyah Rivers MD at [facility] Right 1975       Social History:    Social History     Tobacco Use    Smoking status: Former Smoker     Packs/day: 0.50     Years: 40.00     Pack years: 20.00     Types: Cigarettes     Start date: 1965     Quit date: 2017     Years since quittin.0    Smokeless tobacco: Never Used    Tobacco comment: To try gum or patch to start quiting classes    Substance Use Topics    Alcohol use: Yes     Comment: rare                                Counseling given: Not Answered  Comment: To try gum or patch to start quiting classes       Vital Signs (Current):   Vitals:    22 0710   BP: (!) 223/105   Pulse: 94   Resp: 16   Temp: 98.5 °F (36.9 °C)   TempSrc: Temporal   SpO2: 100%   Weight: 125 lb (56.7 kg)   Height: 5' 8\" (1.727 m)                                              BP Readings from Last 3 Encounters:   22 (!) 223/105   22 (!) 150/82   22 (!) 188/99       NPO Status: Time of last liquid consumption: 2200                        Time of last solid consumption: 1800                        Date of last liquid consumption: 22                        Date of last solid food consumption: 22    BMI:   Wt Readings from Last 3 Encounters:   22 125 lb (56.7 kg)   22 128 lb (58.1 kg)   22 128 lb (58.1 kg)     Body mass index is 19.01 kg/m².     CBC:   Lab Results   Component Value Date    WBC 7.1 10/13/2021    RBC 4.46 10/13/2021    RBC 4.24 2017    HGB 13.6 10/13/2021 HCT 40.0 10/13/2021    MCV 89.8 10/13/2021    RDW 13.0 10/13/2021     10/13/2021       CMP:   Lab Results   Component Value Date     10/13/2021    K 5.6 10/13/2021    K 3.9 12/19/2018     10/13/2021    CO2 22 10/13/2021    BUN 17 10/13/2021    CREATININE 1.1 10/13/2021    GFRAA >60 10/13/2021    GFRAA >60 10/27/2012    AGRATIO 1.2 10/13/2021    LABGLOM >60 10/13/2021    LABGLOM 86 03/28/2017    GLUCOSE 127 10/13/2021    GLUCOSE 150 06/29/2017    PROT 8.3 10/13/2021    PROT 8.1 06/29/2017    CALCIUM 9.8 10/13/2021    BILITOT 0.4 10/13/2021    ALKPHOS 143 10/13/2021    AST 34 10/13/2021    ALT 48 10/13/2021       POC Tests: No results for input(s): POCGLU, POCNA, POCK, POCCL, POCBUN, POCHEMO, POCHCT in the last 72 hours. Coags:   Lab Results   Component Value Date    PROTIME 10.6 04/11/2018    INR 0.94 04/11/2018    APTT 26.4 04/11/2018       HCG (If Applicable): No results found for: PREGTESTUR, PREGSERUM, HCG, HCGQUANT     ABGs: No results found for: PHART, PO2ART, OPG0CFZ, OIZ6RFW, BEART, V6DHXXYZ     Type & Screen (If Applicable):  No results found for: LABABO, LABRH    Drug/Infectious Status (If Applicable):  No results found for: HIV, HEPCAB    COVID-19 Screening (If Applicable): No results found for: COVID19        Anesthesia Evaluation  Patient summary reviewed and Nursing notes reviewed no history of anesthetic complications:   Airway: Mallampati: I  TM distance: >3 FB   Neck ROM: full  Mouth opening: > = 3 FB Dental:    (+) upper dentures and lower dentures      Pulmonary:normal exam    (+) COPD:                             Cardiovascular:    (+) hypertension:, CHF:,                ROS comment: Dilated cardiomyopathy     Neuro/Psych:   Negative Neuro/Psych ROS              GI/Hepatic/Renal:   (+) GERD:,           Endo/Other:    (+) DiabetesType II DM, , .                 Abdominal:             Vascular: negative vascular ROS.          Other Findings:             Anesthesia

## 2022-05-24 ENCOUNTER — TELEPHONE (OUTPATIENT)
Dept: PULMONOLOGY | Age: 72
End: 2022-05-24

## 2022-05-24 NOTE — TELEPHONE ENCOUNTER
Referral faxed to [facility], Dr Nallely Sneed to call pt and set up a consultation.

## 2022-05-24 NOTE — TELEPHONE ENCOUNTER
----- Message from Bishnu Robles MD sent at 5/23/2022  5:24 PM EDT -----  As per the conversation I had with Yousif Henning and his family I called him with the results of his biopsy. Unfortunately it's lung cancer again, and the same cell type as it was 5 years ago. I told him I would send the results to 's Nestor Cox and Hardy George who treated his previous lung cancer 5 years ago so they could take the reigns again.     I will also my staff to help coordinate follow up with the oncology portion of his treatment team.

## 2022-06-15 ENCOUNTER — HOSPITAL ENCOUNTER (OUTPATIENT)
Dept: MRI IMAGING | Age: 72
Discharge: HOME | End: 2022-06-15
Payer: MEDICARE

## 2022-06-15 DIAGNOSIS — C67.2 MALIGNANT NEOPLASM OF LATERAL WALL OF URINARY BLADDER (HCC): ICD-10-CM

## 2022-06-15 DIAGNOSIS — C34.00 MALIGNANT NEOPLASM OF MAIN BRONCHUS, UNSPECIFIED LATERALITY (HCC): ICD-10-CM

## 2022-06-15 PROCEDURE — 6360000004 HC RX CONTRAST MEDICATION: Performed by: INTERNAL MEDICINE

## 2022-06-15 PROCEDURE — A9579 GAD-BASE MR CONTRAST NOS,1ML: HCPCS | Performed by: INTERNAL MEDICINE

## 2022-06-15 PROCEDURE — 70553 MRI BRAIN STEM W/O & W/DYE: CPT

## 2022-06-15 RX ADMIN — GADOTERIDOL 12 ML: 279.3 INJECTION, SOLUTION INTRAVENOUS at 07:41

## 2022-07-11 ENCOUNTER — HOSPITAL ENCOUNTER (OUTPATIENT)
Dept: INTERVENTIONAL RADIOLOGY/VASCULAR | Age: 72
Discharge: HOME | End: 2022-07-11
Payer: MEDICARE

## 2022-07-11 VITALS — HEIGHT: 66 IN | WEIGHT: 126 LBS | TEMPERATURE: 98.1 F | BODY MASS INDEX: 20.25 KG/M2

## 2022-07-11 DIAGNOSIS — C34.00 MALIGNANT NEOPLASM OF MAIN BRONCHUS, UNSPECIFIED LATERALITY (HCC): ICD-10-CM

## 2022-07-11 PROCEDURE — 2500000003 HC RX 250 WO HCPCS

## 2022-07-11 PROCEDURE — C1894 INTRO/SHEATH, NON-LASER: HCPCS

## 2022-07-11 PROCEDURE — 85610 PROTHROMBIN TIME: CPT

## 2022-07-11 PROCEDURE — 6360000002 HC RX W HCPCS

## 2022-07-11 PROCEDURE — 77001 FLUOROGUIDE FOR VEIN DEVICE: CPT

## 2022-07-11 PROCEDURE — 99152 MOD SED SAME PHYS/QHP 5/>YRS: CPT

## 2022-07-11 PROCEDURE — 36561 INSERT TUNNELED CV CATH: CPT

## 2022-07-11 PROCEDURE — 99153 MOD SED SAME PHYS/QHP EA: CPT

## 2022-07-11 PROCEDURE — C1788 PORT, INDWELLING, IMP: HCPCS

## 2022-07-11 NOTE — H&P
Patient:  Rhett Wilkerson   :   1950      Relevant clinical history, particularly as it involves the pending procedure, was reviewed and discussed. The procedure including risks, benefits, and alternatives was discussed at length with the patient (or designated family member) and all questions were answered. Informed consent to proceed with the procedure was given. Vital signs were monitored and documented by the Radiology nurse. Targeted physical examination  Heart : regular rate and rhythm  Lungs : clear, breathing easily  Condition : stable    [facility] nurses notes reviewed and agreed. Past Medical History:        Diagnosis Date    Allergic rhinitis, cause unspecified 7/15/2010    Cancer ([facility] [address]     lung    Colon polyps     Diabetes mellitus ([facility] [address]     Essential hypertension, benign 7/15/2010    HYPERCHOLESTERAEMIA     Hypertension     Lipoma of other specified sites 7/15/2010    Other abnormal blood chemistry 7/15/2010    Other and unspecified hyperlipidemia 7/15/2010    Other symptoms involving cardiovascular system 7/15/2010    Psychosexual dysfunction with inhibited sexual excitement 7/15/2010       Past Surgical History:           Procedure Laterality Date    BRONCHOSCOPY Right 2017    Dr. Tamara Rodriguez - w/R BI stent placement size 12x3    BRONCHOSCOPY  2017    FAVIO Aguilar - w/EBUS    BRONCHOSCOPY  2017    BRONCHOSCOPY N/A 2022    ROBOTIC NAVIGATIONAL BRONCHOSCOPY WITH TRANSBRONCHIAL BIOPSIES USING FLUOROSCOPY AND RADIAL PROBE ULTRASOUND performed by Minnie Quintanilla MD at [address] [place] [address]  2022    BRONCHOSCOPY performed by Minnie Quintanilla MD at [place] [address]  10/31/2007    polyps    COLONOSCOPY  2012    polyp- repeat     COLONOSCOPY  2020    polyp Dr Justin Villela repeat      COLONOSCOPY  2020    Dr. Ricardo Menon, polyps removed, repeat 2023    CYSTOSCOPY N/A 2019    CYSTOSCOPY TRANSURETHRAL RESECTION BLADDER performed by Any Gil MD at [facility] Left 08/14/2019    OPEN LEFT INGUINAL HERNIA REPAIR WITH MESH performed by Jaye Horne MD at [address] [facility] Right 1975    IR PORT PLACEMENT EQUAL OR GREATER THAN 5 YEARS  7/11/2022    IR PORT PLACEMENT EQUAL OR GREATER THAN 5 YEARS 7/11/2022 [facility] SPECIAL PROCEDURES       Allergies:  Metformin and related    Medications:   Home Meds  Current Outpatient Medications on File Prior to Encounter   Medication Sig Dispense Refill    glipiZIDE (GLUCOTROL) 10 MG tablet Take 10 mg by mouth      aspirin EC 81 MG EC tablet Take 1 tablet by mouth daily 90 tablet 1    lisinopril (PRINIVIL;ZESTRIL) 40 MG tablet TAKE 1 TABLET BY MOUTH EVERY DAY 90 tablet 0    amLODIPine (NORVASC) 10 MG tablet TAKE 1 TABLET BY MOUTH EVERY DAY 30 tablet 3    albuterol sulfate  (90 Base) MCG/ACT inhaler INHALE 2 PUFFS BY MOUTH EVERY 6 HOURS AS NEEDED FOR WHEEZE 18 each 3    furosemide (LASIX) 20 MG tablet TAKE 1 TABLET BY MOUTH EVERY DAY 90 tablet 1    rosuvastatin (CRESTOR) 20 MG tablet TAKE 1 TABLET BY MOUTH EVERY DAY 30 tablet 3    carvedilol (COREG) 25 MG tablet TAKE 1 TABLET BY MOUTH TWICE A DAY WITH MEALS 180 tablet 1    dapagliflozin (FARXIGA) 10 MG tablet Take 1 tablet by mouth every morning 90 tablet 1    insulin glargine (LANTUS SOLOSTAR) 100 UNIT/ML injection pen Inject 22 Units into the skin nightly (Patient taking differently: Inject 7 Units into the skin nightly ) 5 pen 3    omeprazole (PRILOSEC) 20 MG delayed release capsule Take 20 mg by mouth as needed        No current facility-administered medications on file prior to encounter. Current Meds  No current facility-administered medications for this encounter.         ASA 2 - Patient with mild systemic disease with no functional limitations    III (soft palate, base of uvula visible)    Activity:  2 - Able to move 4 extremities voluntarily on command  Respiration:  2 - Able to breathe deeply and cough freely  Circulation:  2 - BP+/- 20mmHg of normal  Consciousness:  2 - Fully awake  Oxygen Saturation (color):  2 - Able to maintain oxygen saturation >92% on room air    Sedation : Cautious mild-moderate sedation, h/o sleep apnea with cpap use    HPI / Treatment plan : Chest port placement

## 2022-07-12 LAB
INR BLD: 1.2 (ref 0.88–1.12)
Lab: NORMAL
REPORT: NORMAL
THIS TEST SENT TO: NORMAL

## 2022-07-19 RX ORDER — LISINOPRIL 40 MG/1
TABLET ORAL
Qty: 90 TABLET | Refills: 0 | OUTPATIENT
Start: 2022-07-19

## 2022-08-12 RX ORDER — LISINOPRIL 40 MG/1
TABLET ORAL
Qty: 90 TABLET | Refills: 0 | Status: SHIPPED | OUTPATIENT
Start: 2022-08-12

## 2022-09-30 DIAGNOSIS — R06.2 WHEEZING: ICD-10-CM

## 2022-10-01 RX ORDER — ALBUTEROL SULFATE 90 UG/1
AEROSOL, METERED RESPIRATORY (INHALATION)
Qty: 18 EACH | Refills: 1 | OUTPATIENT
Start: 2022-10-01

## 2022-10-24 ENCOUNTER — OFFICE VISIT (OUTPATIENT)
Dept: PULMONOLOGY | Age: 72
End: 2022-10-24
Payer: MEDICARE

## 2022-10-24 VITALS
SYSTOLIC BLOOD PRESSURE: 141 MMHG | WEIGHT: 115 LBS | HEIGHT: 66 IN | BODY MASS INDEX: 18.48 KG/M2 | DIASTOLIC BLOOD PRESSURE: 72 MMHG | OXYGEN SATURATION: 100 % | HEART RATE: 101 BPM

## 2022-10-24 DIAGNOSIS — C34.11 MALIGNANT NEOPLASM OF UPPER LOBE OF RIGHT LUNG (HCC): Primary | ICD-10-CM

## 2022-10-24 DIAGNOSIS — R06.2 WHEEZING: ICD-10-CM

## 2022-10-24 DIAGNOSIS — C34.91 SQUAMOUS CELL CARCINOMA OF RIGHT LUNG (HCC): ICD-10-CM

## 2022-10-24 PROCEDURE — 1123F ACP DISCUSS/DSCN MKR DOCD: CPT | Performed by: INTERNAL MEDICINE

## 2022-10-24 PROCEDURE — 99213 OFFICE O/P EST LOW 20 MIN: CPT | Performed by: INTERNAL MEDICINE

## 2022-10-24 RX ORDER — ALBUTEROL SULFATE 90 UG/1
2 AEROSOL, METERED RESPIRATORY (INHALATION) EVERY 6 HOURS PRN
Qty: 18 EACH | Refills: 5 | Status: SHIPPED | OUTPATIENT
Start: 2022-10-24

## 2022-10-24 NOTE — PROGRESS NOTES
[facility] Pulmonary and Critical Care    Outpatient Follow Up Note    Subjective:   CHIEF COMPLAINT / HPI:     The patient is 74 y.o. male who presents today for COPD, and lung cancer. Chloé Winn had his bronchoscopy following his screening CT scan this spring and it confirmed recurrence of squamous cell lung carcinoma. He is actively being treated by Dr. Bird Vazquez. Things seem to be going fairly well except that he is lost another 2 pounds. He does get some shortness of breath when he tries to mow the lawn but he is doing his best to stay active. He had a PET scan earlier this month as part of the surveillance for his treatment. Interval history:  Chloé Winn came in because the screening CT I did last visit showed a new nodule and we had to discuss a plan. Chloé Winn has been breathing pretty well for the past year and a half since I saw him. He continues to remain active and complains that he is not been able to gain any weight. He has not had a follow-up CT scan in almost 2 years. He noticed a bump on his back that his daughter said felt like an egg so he was worried and made appointment to come in. Previous history:  Chloé Winn is doing pretty well, however, he was admitted to [place] in December for shortness of breath from new systolic heart failure. He had a CTPA which was negative for clot and showed a stable esophageal lymph node. He had an echo showeing an EF of 40-45%. He's now on lasix and coreg. Previous history: Since last time I saw Mr. Jeremiah Pelletier in the office I performed a bronchoscopy with intent to do ebus however he had complete obstruction of his right upper lobe bronchus required debulking and biopsy and eventually stenting of the airway by Dr. Neda Aceves. Patient's lung biopsies came back with squamous cell carcinoma. Following the stent placement which blocks off his right upper lobe Mr. Jeremiah Pelletier developed an asymptomatic right sided pneumothorax with complete collapse of the right lung.   He was admitted to the hospital and we placed a small bore chest tube which evacuated the pneumothorax and then the chest tube fell out a day and a half later without resolution of the pneumothorax is in the hospital.  However, when he went to his next CT simulation for radiation oncology 4 days later the pneumothorax had recurred. Instead of admitting him this time because he was again asymptomatic, I sent him to interventional radiology where a new small bore chest tube was placed and attached to a pneumo stat. He's had a small chest tube in for 2 weeks without recurrence of the pneumothorax thus far. He's feeling pretty well today and had radiation therapy earlier today. He's concerned because the tubing on his chest tube is getting longer and he scraped the chest tube is coming out. He does not want to have a third one placed.      Past Medical History:    Past Medical History:   Diagnosis Date    Allergic rhinitis, cause unspecified 7/15/2010    Cancer ([facility] [address]     lung    Colon polyps     Diabetes mellitus ([facility] [address]     Essential hypertension, benign 7/15/2010    HYPERCHOLESTERAEMIA     Hypertension     Lipoma of other specified sites 7/15/2010    Other abnormal blood chemistry 7/15/2010    Other and unspecified hyperlipidemia 7/15/2010    Other symptoms involving cardiovascular system 7/15/2010    Psychosexual dysfunction with inhibited sexual excitement 7/15/2010       Social History:    Social History     Tobacco Use   Smoking Status Former    Packs/day: 0.50    Years: 40.00    Pack years: 20.00    Types: Cigarettes    Start date: 1965    Quit date: 2017    Years since quittin.5   Smokeless Tobacco Never   Tobacco Comments    To try gum or patch to start quiting classes        Current Medications:  Current Outpatient Medications on File Prior to Visit   Medication Sig Dispense Refill    lisinopril (PRINIVIL;ZESTRIL) 40 MG tablet TAKE 1 TABLET BY MOUTH EVERY DAY 90 tablet 0    glipiZIDE (GLUCOTROL) 10 MG tablet Take 10 mg by mouth      aspirin EC 81 MG EC tablet Take 1 tablet by mouth daily 90 tablet 1    amLODIPine (NORVASC) 10 MG tablet TAKE 1 TABLET BY MOUTH EVERY DAY 30 tablet 3    furosemide (LASIX) 20 MG tablet TAKE 1 TABLET BY MOUTH EVERY DAY 90 tablet 1    rosuvastatin (CRESTOR) 20 MG tablet TAKE 1 TABLET BY MOUTH EVERY DAY 30 tablet 3    carvedilol (COREG) 25 MG tablet TAKE 1 TABLET BY MOUTH TWICE A DAY WITH MEALS 180 tablet 1    insulin glargine (LANTUS SOLOSTAR) 100 UNIT/ML injection pen Inject 22 Units into the skin nightly (Patient taking differently: Inject 7 Units into the skin nightly) 5 pen 3    omeprazole (PRILOSEC) 20 MG delayed release capsule Take 20 mg by mouth as needed       dapagliflozin (FARXIGA) 10 MG tablet Take 1 tablet by mouth every morning 90 tablet 1     No current facility-administered medications on file prior to visit.        REVIEW OF SYSTEMS:    CONSTITUTIONAL: Negative for fevers and chills  HEENT: Negative for oropharyngeal exudate, post nasal drip, sinus pain / pressure, nasal congestion, ear pain  RESPIRATORY:  See HPI  CARDIOVASCULAR: Negative for chest pain, palpitations, edema  GASTROINTESTINAL: Negative for nausea, vomiting, diarrhea, constipation and abdominal pain  HEMATOLOGICAL: Negative for adenopathy  SKIN: Negative for clubbing, cyanosis, skin lesions  EXTREMITIES: Negative for weakness, decreased ROM  NEUROLOGICAL: Negative for unilateral weakness, speech or gait abnormalities  PSYCH: Negative for anxiety, depression    Objective:   PHYSICAL EXAM:        VITALS:  BP (!) 141/72 (Site: Right Upper Arm, Position: Sitting, Cuff Size: Medium Adult)   Pulse (!) 101   Ht 5' 6\" (1.676 m)   Wt 115 lb (52.2 kg)   SpO2 100%   BMI 18.56 kg/m²     CONSTITUTIONAL:  Awake, alert, cooperative, no apparent distress, and appears stated age  HEENT: No oropharyngeal exudate, PERRL, no cervical adenopathy, no tracheal deviation, thyroid size normal  LUNGS:  No increased work of breathing and clear to auscultation, no crackles. No wheezing. Transmitted breath sounds in RUL  CARDIOVASCULAR:  normal S1 and S2 and no JVD  ABDOMEN:  Normal bowel sounds, non-distended and non-tender to palpation  EXT: No edema, no calf tenderness. Pulses are present bilaterally. NEUROLOGIC:  Mental Status Exam:  Level of Alertness:   awake  Orientation:   person, place, time. SKIN:  normal skin color, texture, turgor, no redness, warmth, or swelling, with the exception of the area around his right scapula which has small areas of erythema and hyperpigmentation. DATA:      Radiology Review:  Pertinent images / reports were reviewed as a part of this visit. Chest x-ray reveals the following:     Post procedural chest film shows the small bore chest tube   extending to the lung apex with virtually complete reexpansion of   the right lung following manual evacuation of pneumothorax. Consolidated lung in the right upper chest is consistent with   atelectasis. Trachea is midline. Impression:       Chest tube placement with reexpansion of the right lung         3/18  No change since 1/24/2018, with extensive atelectasis in the right   lung and soft tissue thickening in the right hilar region. 10/18  Impression       1. Posttherapeutic changes reidentified in the right upper lobe including confluent airspace disease posteriorly into the superior segment of the lower lobe, similar to the previous study. There are slightly increased groundglass and reticular opacities    anteriorly in the upper lobe, which are nonspecific but could represent ongoing fibrosis versus infection or inflammation. Attention on follow-up. 2.  The small amount of loculated right apical pleural air on the previous study has been replaced with a small amount of pleural fluid. 3.  Borderline sized right paraesophageal lymph node, increased from previous study. 3/2019  Impression       1.  Posttherapy changes noted with consolidation and bronchiectasis right apex. No evidence recurrent or residual neoplasm. Subcentimeter pretracheal lymph node unchanged benign. No significant lymphadenopathy. Coronary artery calcification which is a risk factor for acute coronary syndrome. Mild compression fracture superior endplate T7 with adjacent bony sclerosis may relate to posttraumatic or osteoporotic compression fracture. Pathologic compression fracture is not excluded. Continued follow-up recommended. Findings present previously     9/2019  Impression       Consolidation/atelectasis and volume loss in the right thorax without change. Chronic pancreatitis. Stable small left pulmonary nodules. Last PFTs: CONCLUSION:  Moderate obstructive defect without bronchodilator  response. Air trapping present and moderate decrease in diffusion. These findings are most consistent with the diagnosis of COPD. Assessment: This is a 74 y.o. 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 Has a rescue inhaler that he uses sparingly, continue. Lung cancer: Initially diagnosed in 2017 with a tumor obstructing his right upper lobe. He now has confirmed recurrence on the contralateral side. He is currently undergoing treatment and results of the PET scan are not yet in the system so we are requesting the results to be faxed over. Diagnosis Orders   1. Wheezing  albuterol sulfate HFA (PROVENTIL;VENTOLIN;PROAIR) 108 (90 Base) MCG/ACT inhaler           The patient is not currently smoking. Recommend maintaining a smoke-free lifestyle. RTC in 6 months, W/ MD. Call or RTC sooner if symptoms persist or worsen acutely.       Chica Hendrickson MD

## 2022-11-07 RX ORDER — LISINOPRIL 40 MG/1
TABLET ORAL
Qty: 90 TABLET | Refills: 0 | OUTPATIENT
Start: 2022-11-07

## 2022-11-09 ASSESSMENT — ENCOUNTER SYMPTOMS
RESPIRATORY NEGATIVE: 1
GASTROINTESTINAL NEGATIVE: 1

## 2022-11-09 NOTE — PROGRESS NOTES
[address]   Congestive Heart Failure    PrimaryCare Doctor:  Md Jody Gifford  Last OV Nov 2022: no med changes, echo ordered    Chief Complaint:  CHF    History of Present Illness:  Castro Hsieh is a 74 y.o. male with PMH COPD, recurrent lung cancer getting treatment, HTN, DM, HLD, HFimpEF who presents today for CHF f/u. Today he states that he still feels good despite cancer treatment and denies chest pain, dyspnea, palpitations, orthopnea, PND, exertional chest pressure/discomfort, fatigue, early saiety, edema, syncope. He has lost about 5lb in 6months with cancer treatment    ER Visit: No  Recent Hospitalization: No    Baseline Weight: 115-120  Wt Readings from Last 3 Encounters:   11/15/22 116 lb (52.6 kg)   10/24/22 115 lb (52.2 kg)   07/11/22 126 lb (57.2 kg)       EF: 50%  Cardiac Imaging:Echo 5/18/21:   Summary   Technically difficult and limited examination. Overall, left ventricular systolic function is borderline with LVEF   estimated at 50%. No obvious regional wall motion abnormalities are noted. Aortic valve appears sclerotic but opens adequately. Echo 12/18/2018:  Summary   -Technically difficult examination. Lung Cancer.   -Normal left ventricular cavity size and wall thickness.   -Moderately reduced systolic function with an ejection fraction of 40-45%.   -No obvious regional wall motion abnormalities noted. -Normal diastolic function.   - Mild mitral regurgitation.   -There is sclerotic nodular thickening of the aortic valve cusps. -Thickening on the tricuspid annulus.   -Trivial tricuspid regurgitation with a RVSP of 29 mmHg. Echo 8/8/2018:   Summary   -This is a limited exam to evaluate aortic valve. Patient had a complete   exam 4/12/2018   -Normal global systolic function with an ejection fraction of 55%.   -No obvious regional wall motion abnormalities noted.    -There is sclerotic nodular thickening of the aortic valve cusps without evidence of significant [FreeTextEntry1] : Await polyp pathology. Repeat colonoscopy in five years. stenosis or regurgitation. MPI 4/12/18:  Summary   No EKG evidence for ischemia with exercise   Normal LV size and systolic function. There is normal isotope uptake at stress and rest. There is no evidence of  myocardial ischemia or scar. Device: No     Activity: at baseline  Can you walk 1-2 blocks or do a moderate amount of house/yard work? Yes    NYHA Class: I     Sodium Restrictions: 3g  Fluid Restrictions: 48-64 oz/day  Sodium and fluid restriction compliance: good    Pt Education: The patient has received education on the following topics: dietary sodium restriction, heart failure medications, the importance of physical activity, symptom management and weight monitoring     Past Medical History:   has a past medical history of Allergic rhinitis, cause unspecified, Cancer ([facility] [address] Colon polyps, Diabetes mellitus ([facility] [address] Essential hypertension, benign, HYPERCHOLESTERAEMIA, Hypertension, Lipoma of other specified sites, Other abnormal blood chemistry, Other and unspecified hyperlipidemia, Other symptoms involving cardiovascular system, and Psychosexual dysfunction with inhibited sexual excitement. Surgical History:   has a past surgical history that includes Colonoscopy (10/31/2007); Colonoscopy (2012); bronchoscopy (Right, 04/27/2017); bronchoscopy (04/24/2017); Inguinal hernia repair (Right, 1975); bronchoscopy (09/12/2017); hernia repair (Left, 08/14/2019); Cystoscopy (N/A, 08/14/2019); Colonoscopy (02/2020); Colonoscopy (02/06/2020); bronchoscopy (N/A, 5/19/2022); bronchoscopy (5/19/2022); and IR PORT PLACEMENT > 5 YEARS (7/11/2022). Social History:   reports that he quit smoking about 5 years ago. His smoking use included cigarettes. He started smoking about 57 years ago. He has a 20.00 pack-year smoking history. He has never used smokeless tobacco. He reports current alcohol use. He reports that he does not currently use drugs after having used the following drugs: Marijuana [employer]).    Family History:   Family History   Problem Relation Age of Onset    Breast Cancer Mother      HomeMedications:  Prior to Admission medications    Medication Sig Start Date End Date Taking? Authorizing Provider   albuterol sulfate HFA (PROVENTIL;VENTOLIN;PROAIR) 108 (90 Base) MCG/ACT inhaler Inhale 2 puffs into the lungs every 6 hours as needed for Wheezing 10/24/22   Winter Delgado MD   lisinopril (PRINIVIL;ZESTRIL) 40 MG tablet TAKE 1 TABLET BY MOUTH EVERY DAY 8/12/22   Waqas Krishna MD   glipiZIDE (GLUCOTROL) 10 MG tablet Take 10 mg by mouth 3/14/22   Historical Provider, MD   aspirin EC 81 MG EC tablet Take 1 tablet by mouth daily 5/16/22   NIDIA Thorpe CNP   amLODIPine (NORVASC) 10 MG tablet TAKE 1 TABLET BY MOUTH EVERY DAY 3/30/22   Waqas Krishna MD   furosemide (LASIX) 20 MG tablet TAKE 1 TABLET BY MOUTH EVERY DAY 3/24/22   NIDIA Thorpe CNP   rosuvastatin (CRESTOR) 20 MG tablet TAKE 1 TABLET BY MOUTH EVERY DAY 12/23/21   Luciana Sands MD   carvedilol (COREG) 25 MG tablet TAKE 1 TABLET BY MOUTH TWICE A DAY WITH MEALS 9/15/21   NIDIA Thorpe CNP   dapagliflozin (FARXIGA) 10 MG tablet Take 1 tablet by mouth every morning 7/21/21 7/16/22  Waqas Krishna MD   insulin glargine (LANTUS SOLOSTAR) 100 UNIT/ML injection pen Inject 22 Units into the skin nightly  Patient taking differently: Inject 7 Units into the skin nightly 5/20/21   Waqas Krishna MD   omeprazole (PRILOSEC) 20 MG delayed release capsule Take 20 mg by mouth as needed     Historical Provider, MD        Allergies:  Metformin and related     ROS:   Review of Systems   Constitutional: Negative. Respiratory: Negative. Cardiovascular: Negative. Gastrointestinal: Negative. Genitourinary: Negative. Musculoskeletal: Negative. Skin: Negative. Neurological: Negative. Hematological: Negative. Psychiatric/Behavioral: Negative.        Physical Examination:    Vitals:    11/15/22 0910   BP: 120/70   Site: Right Upper Arm   Position: Sitting   Cuff Size: Medium Adult   Pulse: 72   SpO2: 98%   Weight: 116 lb (52.6 kg)   Height: 5' 6\" (1.676 m)           Physical Exam  Constitutional:       Appearance: He is well-developed. HENT:      Head: Normocephalic and atraumatic. Eyes:      Pupils: Pupils are equal, round, and reactive to light. Cardiovascular:      Rate and Rhythm: Normal rate and regular rhythm. Pulses: Normal pulses. Heart sounds: Normal heart sounds. Pulmonary:      Effort: Pulmonary effort is normal.      Breath sounds: Normal breath sounds. Abdominal:      Palpations: Abdomen is soft. Musculoskeletal:         General: Normal range of motion. Cervical back: Normal range of motion and neck supple. Skin:     General: Skin is warm and dry. Neurological:      General: No focal deficit present. Mental Status: He is alert and oriented to person, place, and time. Mental status is at baseline. Psychiatric:         Behavior: Behavior normal.         Thought Content:  Thought content normal.         Judgment: Judgment normal.       Lab Data:    CBC:   Lab Results   Component Value Date/Time    WBC 7.1 10/13/2021 10:27 AM    WBC 6.5 04/21/2021 09:27 AM    WBC 7.0 01/13/2021 12:55 PM    RBC 4.46 10/13/2021 10:27 AM    RBC 4.61 04/21/2021 09:27 AM    RBC 4.49 01/13/2021 12:55 PM    RBC 4.24 06/29/2017 09:58 AM    RBC 4.04 06/22/2017 09:47 AM    RBC 4.43 06/15/2017 10:35 AM    HGB 13.6 10/13/2021 10:27 AM    HGB 13.9 04/21/2021 09:27 AM    HGB 13.5 01/13/2021 12:55 PM    HCT 40.0 10/13/2021 10:27 AM    HCT 41.6 04/21/2021 09:27 AM    HCT 39.7 01/13/2021 12:55 PM    MCV 89.8 10/13/2021 10:27 AM    MCV 90.2 04/21/2021 09:27 AM    MCV 88.3 01/13/2021 12:55 PM    RDW 13.0 10/13/2021 10:27 AM    RDW 14.1 04/21/2021 09:27 AM    RDW 13.5 01/13/2021 12:55 PM     10/13/2021 10:27 AM     04/21/2021 09:27 AM     01/13/2021 12:55 PM     BMP:  Lab Results   Component Value Date/Time  10/13/2021 10:27 AM     04/16/2021 09:15 AM     01/13/2021 12:55 PM    K 5.6 10/13/2021 10:27 AM    K 5.0 04/16/2021 09:15 AM    K 4.1 01/13/2021 12:55 PM    K 3.9 12/19/2018 04:31 AM    K 3.9 04/13/2018 05:25 AM     10/13/2021 10:27 AM     04/16/2021 09:15 AM     01/13/2021 12:55 PM    CO2 22 10/13/2021 10:27 AM    CO2 26 04/16/2021 09:15 AM    CO2 24 01/13/2021 12:55 PM    PHOS 2.5 05/04/2017 02:08 PM    BUN 17 10/13/2021 10:27 AM    BUN 19 04/16/2021 09:15 AM    BUN 15 01/13/2021 12:55 PM    CREATININE 1.1 10/13/2021 10:27 AM    CREATININE 1.0 04/16/2021 09:15 AM    CREATININE 0.9 01/13/2021 12:55 PM     BNP:   Lab Results   Component Value Date/Time    PROBNP 149 05/21/2020 09:57 AM    PROBNP 205 09/11/2019 08:25 AM    PROBNP 575 03/13/2019 08:47 AM     Iron Studies:  No components found for: FE,  TIBC,  FERRITIN  Iron Deficiency Anemia:  No    IV Iron Therapy:  No  2017 ACC/AHA HF Guidelines:   intravenous iron replacement in patients with New York Heart Association (NYHA) class II and III HF and iron deficiency (ferritin <100 ng/ml or 100-300 ng/ml if transferrin saturation <20%), to improve functional status and QoL. Assessment/Plan:    Encounter Diagnoses   Name     Dilated cardiomyopathy ([facility] [address] On GDT, EF recovered, echo next year    Essential hypertension controlled    Chronic combined systolic and diastolic congestive heart failure ([address] Compensated, fasting labs due              Instructions:   Medications:continue current medications  Labs at [facility] - fasting labs due  Lifestyle Recommendations: Weigh yourself every day in the morning after urination, call [place] if wt increases 2-3lb in one day or 5lb in one week, Limit sodium to 2000mg/day and fluids to 2L or 64oz/day.     Follow up: 6 months with Echo         [facility]: [phone]       I appreciate the opportunity of cooperating in the care of this individual.    NIDIA Thornton - CNP, CNP, 11/9/2022,11:24 AM    QUALITY MEASURES  1. Tobacco Cessation Counseling: NA  2. Retake of BP if >140/90:   NA  3. Documentation to PCP/referring for new patient:  Sent to PCP at close of office visit  4. CAD patient on anti-platelet: NA  5. CAD patient on STATIN therapy:  NA  6. Patient with CHF and aFib on anticoagulation:  NA   7. Patient Education:NA   8. BB for LVSD :  Yes   9. ACE/ARB for LVSD:  Yes   10.  Left Ventricular Ejection Fraction (LVEF) Assessment:  Yes

## 2022-11-11 RX ORDER — LISINOPRIL 40 MG/1
TABLET ORAL
Qty: 90 TABLET | Refills: 0 | Status: SHIPPED | OUTPATIENT
Start: 2022-11-11

## 2022-11-15 ENCOUNTER — OFFICE VISIT (OUTPATIENT)
Dept: CARDIOLOGY CLINIC | Age: 72
End: 2022-11-15
Payer: MEDICARE

## 2022-11-15 VITALS
BODY MASS INDEX: 18.64 KG/M2 | HEIGHT: 66 IN | HEART RATE: 72 BPM | DIASTOLIC BLOOD PRESSURE: 70 MMHG | SYSTOLIC BLOOD PRESSURE: 120 MMHG | WEIGHT: 116 LBS | OXYGEN SATURATION: 98 %

## 2022-11-15 DIAGNOSIS — I50.42 CHRONIC COMBINED SYSTOLIC AND DIASTOLIC CONGESTIVE HEART FAILURE (HCC): Primary | ICD-10-CM

## 2022-11-15 DIAGNOSIS — I42.0 DILATED CARDIOMYOPATHY (HCC): ICD-10-CM

## 2022-11-15 DIAGNOSIS — I10 ESSENTIAL HYPERTENSION: ICD-10-CM

## 2022-11-15 PROCEDURE — 3074F SYST BP LT 130 MM HG: CPT | Performed by: NURSE PRACTITIONER

## 2022-11-15 PROCEDURE — 1123F ACP DISCUSS/DSCN MKR DOCD: CPT | Performed by: NURSE PRACTITIONER

## 2022-11-15 PROCEDURE — 99214 OFFICE O/P EST MOD 30 MIN: CPT | Performed by: NURSE PRACTITIONER

## 2022-11-15 PROCEDURE — 3078F DIAST BP <80 MM HG: CPT | Performed by: NURSE PRACTITIONER

## 2022-11-15 NOTE — PATIENT INSTRUCTIONS
Instructions:   Medications:continue current medications  Labs at [facility] - fasting labs due  Lifestyle Recommendations: Weigh yourself every day in the morning after urination, call [facility] Estrada if wt increases 2-3lb in one day or 5lb in one week, Limit sodium to 2000mg/day and fluids to 2L or 64oz/day.     Follow up: 6 months with Echo         [facility]: [phone]

## 2023-01-06 ENCOUNTER — HOSPITAL ENCOUNTER (OUTPATIENT)
Dept: CT IMAGING | Age: 73
Discharge: HOME | End: 2023-01-06
Payer: MEDICARE

## 2023-01-06 DIAGNOSIS — C34.2 MALIGNANT NEOPLASM OF MIDDLE LOBE, BRONCHUS OR LUNG (HCC): ICD-10-CM

## 2023-01-06 LAB
GFR SERPL CREATININE-BSD FRML MDRD: >60 ML/MIN/{1.73_M2}
PERFORMED ON: NORMAL
POC CREATININE: 0.9 MG/DL (ref 0.8–1.3)
POC SAMPLE TYPE: NORMAL

## 2023-01-06 PROCEDURE — 71260 CT THORAX DX C+: CPT

## 2023-01-06 PROCEDURE — A4641 RADIOPHARM DX AGENT NOC: HCPCS | Performed by: ORTHOPAEDIC SURGERY

## 2023-01-06 PROCEDURE — 82565 ASSAY OF CREATININE: CPT

## 2023-01-06 PROCEDURE — 6360000004 HC RX CONTRAST MEDICATION: Performed by: ORTHOPAEDIC SURGERY

## 2023-01-06 RX ADMIN — BARIUM SULFATE 900 ML: 21 SUSPENSION ORAL at 11:22

## 2023-01-06 RX ADMIN — IOPAMIDOL 80 ML: 755 INJECTION, SOLUTION INTRAVENOUS at 11:22

## 2023-01-09 ENCOUNTER — TELEPHONE (OUTPATIENT)
Dept: CARDIOLOGY CLINIC | Age: 73
End: 2023-01-09

## 2023-01-09 NOTE — TELEPHONE ENCOUNTER
CARDIAC CLEARANCE      What type of procedure are you having? CYSTOSCOPY to check the bladder for cancer per the patient     which physician is performing your procedure? Dr Jarad Mora     When is your procedure scheduled for? 1/24/2023    Where are you having this procedure? [facility]      Are you taking Blood Thinners? If so what? (Name/dose/frequesncy)                 Does the surgeon want you to stop your blood thinner? If so for how long? Phone Number and Contact Name for Physicians office:    [phone]     Fax number to send information:     Please call pt to let him know if this has been approved.

## 2023-01-18 ENCOUNTER — TELEPHONE (OUTPATIENT)
Dept: CARDIOLOGY CLINIC | Age: 73
End: 2023-01-18

## 2023-02-13 RX ORDER — LISINOPRIL 40 MG/1
TABLET ORAL
Qty: 90 TABLET | Refills: 0 | OUTPATIENT
Start: 2023-02-13

## 2023-03-10 DIAGNOSIS — C34.2 PRIMARY CANCER OF RIGHT MIDDLE LOBE OF LUNG (HCC): Primary | ICD-10-CM

## 2023-03-13 RX ORDER — LISINOPRIL 40 MG/1
TABLET ORAL
Qty: 90 TABLET | Refills: 0 | OUTPATIENT
Start: 2023-03-13

## 2023-03-20 ENCOUNTER — TELEPHONE (OUTPATIENT)
Dept: ONCOLOGY | Age: 73
End: 2023-03-20

## 2023-03-20 NOTE — TELEPHONE ENCOUNTER
Referral received from Dr. Zainab Reeves. Called pt to schedule nutrition appointment- no answer; left message for callback.      Electronically signed by Abner Brenner RD, ED on 3/20/2023 at 11:36 AM

## 2023-03-31 ENCOUNTER — HOSPITAL ENCOUNTER (OUTPATIENT)
Dept: CT IMAGING | Age: 73
Discharge: HOME | End: 2023-03-31
Payer: MEDICARE

## 2023-03-31 DIAGNOSIS — C78.02 SECONDARY MALIGNANT NEOPLASM OF LEFT LUNG (HCC): ICD-10-CM

## 2023-03-31 DIAGNOSIS — C77.1 SECONDARY AND UNSPECIFIED MALIGNANT NEOPLASM OF INTRATHORACIC LYMPH NODES (HCC): ICD-10-CM

## 2023-03-31 DIAGNOSIS — C34.2 PRIMARY MALIGNANT NEOPLASM OF RIGHT MIDDLE LOBE OF LUNG (HCC): ICD-10-CM

## 2023-03-31 PROCEDURE — 74176 CT ABD & PELVIS W/O CONTRAST: CPT

## 2023-04-17 ENCOUNTER — OFFICE VISIT (OUTPATIENT)
Dept: PULMONOLOGY | Age: 73
End: 2023-04-17
Payer: MEDICARE

## 2023-04-17 VITALS
BODY MASS INDEX: 17.89 KG/M2 | HEIGHT: 67 IN | HEART RATE: 78 BPM | OXYGEN SATURATION: 98 % | RESPIRATION RATE: 16 BRPM | SYSTOLIC BLOOD PRESSURE: 130 MMHG | DIASTOLIC BLOOD PRESSURE: 80 MMHG | WEIGHT: 114 LBS

## 2023-04-17 DIAGNOSIS — J44.9 COPD, MODERATE (HCC): Primary | ICD-10-CM

## 2023-04-17 DIAGNOSIS — R06.2 WHEEZING: ICD-10-CM

## 2023-04-17 PROCEDURE — 3079F DIAST BP 80-89 MM HG: CPT | Performed by: INTERNAL MEDICINE

## 2023-04-17 PROCEDURE — 99214 OFFICE O/P EST MOD 30 MIN: CPT | Performed by: INTERNAL MEDICINE

## 2023-04-17 PROCEDURE — 3075F SYST BP GE 130 - 139MM HG: CPT | Performed by: INTERNAL MEDICINE

## 2023-04-17 PROCEDURE — 1123F ACP DISCUSS/DSCN MKR DOCD: CPT | Performed by: INTERNAL MEDICINE

## 2023-04-17 RX ORDER — UMECLIDINIUM BROMIDE AND VILANTEROL TRIFENATATE 62.5; 25 UG/1; UG/1
1 POWDER RESPIRATORY (INHALATION) DAILY
Qty: 1 EACH | Refills: 3 | Status: SHIPPED | OUTPATIENT
Start: 2023-04-17

## 2023-04-17 RX ORDER — ALBUTEROL SULFATE 90 UG/1
2 AEROSOL, METERED RESPIRATORY (INHALATION) EVERY 6 HOURS PRN
Qty: 18 EACH | Refills: 5 | Status: SHIPPED | OUTPATIENT
Start: 2023-04-17

## 2023-04-17 NOTE — PROGRESS NOTES
[facility]    Outpatient Follow Up Note    Subjective:   CHIEF COMPLAINT / HPI:     The patient is 74 y.o. male who presents today for COPD, and lung cancer. Jillian Gonsales has started mowing the lawn again and notes that he get exhausted and needs to take a break. He also has been coughing more and what he coughs up has some color to it now, where it didn't before. It's kind of beige and occurs mostly when he lays down. Albuterol helps with the cough and dyspnea. He has an enlarged hilar node on a CT in March. Interval history:  Jillian Gonsales had his bronchoscopy following his screening CT scan this spring and it confirmed recurrence of squamous cell lung carcinoma. He is actively being treated by Dr. Aldo Perez. Things seem to be going fairly well except that he is lost another 2 pounds. He does get some shortness of breath when he tries to mow the lawn but he is doing his best to stay active. He had a PET scan earlier this month as part of the surveillance for his treatment. Interval history:  Jillian Gonsales came in because the screening CT I did last visit showed a new nodule and we had to discuss a plan. Jillian Gonsales has been breathing pretty well for the past year and a half since I saw him. He continues to remain active and complains that he is not been able to gain any weight. He has not had a follow-up CT scan in almost 2 years. He noticed a bump on his back that his daughter said felt like an egg so he was worried and made appointment to come in. Previous history:  Jillian Gonsales is doing pretty well, however, he was admitted to [facility] in December for shortness of breath from new systolic heart failure. He had a CTPA which was negative for clot and showed a stable esophageal lymph node. He had an echo showeing an EF of 40-45%. He's now on lasix and coreg. Previous history: Since last time I saw Mr. Sundar Martin in the office I performed a bronchoscopy with intent to do ebus however he had

## 2023-05-08 ENCOUNTER — TELEPHONE (OUTPATIENT)
Dept: ONCOLOGY | Age: 73
End: 2023-05-08

## 2023-05-08 NOTE — TELEPHONE ENCOUNTER
Re-attempted to contact patient for nutrition referral to schedule with dietitian. No answer- left RD contact info.      Electronically signed by Luanne Carrasco RD, ED on 5/8/2023 at 2:18 PM

## 2023-05-13 ENCOUNTER — HOSPITAL ENCOUNTER (OUTPATIENT)
Dept: CT IMAGING | Age: 73
Discharge: HOME | End: 2023-05-13

## 2023-05-13 DIAGNOSIS — C34.90 MALIGNANT NEOPLASM OF LUNG, UNSPECIFIED LATERALITY, UNSPECIFIED PART OF LUNG (HCC): ICD-10-CM

## 2023-05-16 ASSESSMENT — ENCOUNTER SYMPTOMS
GASTROINTESTINAL NEGATIVE: 1
RESPIRATORY NEGATIVE: 1

## 2023-05-16 NOTE — PROGRESS NOTES
[facility]   Congestive Heart Failure    PrimaryCare Doctor:  No primary care provider on file. Chief Complaint:  CHF    History of Present Illness:  Amando Le is a 74 y.o. male with PMH COPD, recurrent lung cancer, HTN, DM, HLD, HFimpEF who presents today for CHF f/u.no change at last OV, echo done today and results pending. Today he states that he still feels good, still cutting the grass and denies chest pain, dyspnea, palpitations, orthopnea, PND, exertional chest pressure/discomfort, fatigue, early saiety, edema, syncope. He has had trouble gaining wt since last cancer treatment. He gets labs and meds through the [address] [facility]    ER Visit: No  Recent Hospitalization: No    Baseline Weight: 115-120  Wt Readings from Last 3 Encounters:   05/17/23 112 lb (50.8 kg)   04/17/23 114 lb (51.7 kg)   11/15/22 116 lb (52.6 kg)       EF: 50%  Cardiac Imaging:Echo 5/18/21:   Summary   Technically difficult and limited examination. Overall, left ventricular systolic function is borderline with LVEF   estimated at 50%. No obvious regional wall motion abnormalities are noted. Aortic valve appears sclerotic but opens adequately. Echo 12/18/2018:  Summary   -Technically difficult examination. Lung Cancer.   -Normal left ventricular cavity size and wall thickness.   -Moderately reduced systolic function with an ejection fraction of 40-45%.   -No obvious regional wall motion abnormalities noted. -Normal diastolic function.   - Mild mitral regurgitation.   -There is sclerotic nodular thickening of the aortic valve cusps. -Thickening on the tricuspid annulus.   -Trivial tricuspid regurgitation with a RVSP of 29 mmHg. Echo 8/8/2018:   Summary   -This is a limited exam to evaluate aortic valve. Patient had a complete   exam 4/12/2018   -Normal global systolic function with an ejection fraction of 55%.   -No obvious regional wall motion abnormalities noted.    -There is sclerotic nodular thickening of the

## 2023-05-17 ENCOUNTER — OFFICE VISIT (OUTPATIENT)
Dept: CARDIOLOGY CLINIC | Age: 73
End: 2023-05-17
Payer: MEDICARE

## 2023-05-17 ENCOUNTER — HOSPITAL ENCOUNTER (OUTPATIENT)
Dept: NON INVASIVE DIAGNOSTICS | Age: 73
Discharge: HOME | End: 2023-05-17
Payer: MEDICARE

## 2023-05-17 VITALS
DIASTOLIC BLOOD PRESSURE: 68 MMHG | HEIGHT: 67 IN | HEART RATE: 67 BPM | WEIGHT: 112 LBS | BODY MASS INDEX: 17.58 KG/M2 | SYSTOLIC BLOOD PRESSURE: 122 MMHG | OXYGEN SATURATION: 99 %

## 2023-05-17 DIAGNOSIS — I42.0 DILATED CARDIOMYOPATHY (HCC): ICD-10-CM

## 2023-05-17 DIAGNOSIS — I50.42 CHRONIC COMBINED SYSTOLIC AND DIASTOLIC CONGESTIVE HEART FAILURE (HCC): ICD-10-CM

## 2023-05-17 DIAGNOSIS — I10 ESSENTIAL HYPERTENSION: ICD-10-CM

## 2023-05-17 DIAGNOSIS — I50.42 CHRONIC COMBINED SYSTOLIC AND DIASTOLIC CONGESTIVE HEART FAILURE (HCC): Primary | ICD-10-CM

## 2023-05-17 LAB
LV EF: 55 %
LVEF MODALITY: NORMAL

## 2023-05-17 PROCEDURE — 93306 TTE W/DOPPLER COMPLETE: CPT

## 2023-05-17 PROCEDURE — 3078F DIAST BP <80 MM HG: CPT | Performed by: NURSE PRACTITIONER

## 2023-05-17 PROCEDURE — 1123F ACP DISCUSS/DSCN MKR DOCD: CPT | Performed by: NURSE PRACTITIONER

## 2023-05-17 PROCEDURE — 99214 OFFICE O/P EST MOD 30 MIN: CPT | Performed by: NURSE PRACTITIONER

## 2023-05-17 PROCEDURE — 3074F SYST BP LT 130 MM HG: CPT | Performed by: NURSE PRACTITIONER

## 2023-05-17 NOTE — PATIENT INSTRUCTIONS
Instructions:   Medications:continue current medications  [facility]  Lifestyle Recommendations: Weigh yourself every day in the morning after urination, call [facility] if wt increases 2-3lb in one day or 5lb in one week, Limit sodium to 3000mg/day and fluids to 2L or 64oz/day.     Follow up: 6 months unless echo shows us something to see you sooner        [place]: [phone]

## 2023-05-18 ENCOUNTER — TELEPHONE (OUTPATIENT)
Dept: CARDIOLOGY CLINIC | Age: 73
End: 2023-05-18

## 2023-05-18 NOTE — TELEPHONE ENCOUNTER
----- Message from NIDIA Paul CNP sent at 5/17/2023 11:02 AM EDT -----  Echo looks good, nothing new.  Wilfred Barraza

## 2023-05-24 ENCOUNTER — TELEPHONE (OUTPATIENT)
Dept: ONCOLOGY | Age: 73
End: 2023-05-24

## 2023-05-24 NOTE — TELEPHONE ENCOUNTER
Call / voicemail received from patient. RD returned call but no answer- left contact info w/pt.      Electronically signed by Stacey Patino RD, LD on 5/24/2023 at 3:50 PM

## 2023-05-25 ENCOUNTER — HOSPITAL ENCOUNTER (OUTPATIENT)
Dept: ONCOLOGY | Age: 73
Setting detail: INFUSION SERIES
Discharge: HOME | End: 2023-05-25

## 2023-05-25 VITALS — BODY MASS INDEX: 17.54 KG/M2 | HEIGHT: 67 IN

## 2023-05-25 NOTE — PROGRESS NOTES
Nutrition History and Dietary Recall     Food / Nutrition-Related History  Food Allergies or Intolerances: NKA; no sensitivities   Vitamins, Minerals, and other Herbal Supplements: Vit-D;   Food Restrictions / Cultural Requests:   none    Followed diet for medical reasons: yes; DM;   Made dietary/lifestyle changes in past: no     Current Nutrition:   Are you currently following a specific diet? PO Diet: Regular   Oral Nutrition Supplements:  Ensure Max     Do you have a set/regular meal pattern each day? yes. Which meals do you typically consume each day? Breakfast  yes. Lunch  yes. Dinner  yes. How many meals do you typically consume in one day? 3   Snacks - 2x   Cheese & PB 2-3     24 hour recall/food frequency:  Breakfast:   Consumes: 4 out of 7 days  Current meal includes:   2 c Frosted Rice Krispies w/1.5 c milk   2 scram eggs   Pc Toast or 1mini donut   Coffee - 8 oz   PO Intake: %  Fluids: 8 oz milk or  OJ     Lunch:   Consumes: 7 out of 7 days  Current meal includes: [place] -Ham n Cheese; Pepper Loaf or [place];   Soup- 1/2 can [place] vegetable soup   PO Intake: %  Fluids: Ice-T 8 oz     Dinner:   Consumes: 7 out of 7 days  Current meal includes: chicken 3-4 oz; mashed or large potatoes  Cottage ham with potatoes and green beans;2-3 c  PO Intake: %  Fluids: Milk or pepsi     HS Snack:  Consumes: 7 out of 7 days  Current snack includes: 2-3 PB crackers; 4-5 pretzel sticks w/cheese or 3/4 c ice cream   PO Intake: %  Fluids: no      Daily/Additional Fluids/Drinks: (list below)  Water = 2-8 oz per day   8-oz Ice T  8 oz Milk   8-10 oz Coffee   40 oz total fluids     Do you drink alcohol? no. Stopped smoking and drinking 6 years ago     Additional Nutrition-related Rx   Appetite Stimulants: ***  Oral Care: Mouthwash- MMW; Biotene;  MuGard: ***  GERD: PPI ***  Anti-emetics ***  Motility Agents: ***  Pancreatic Enzymes: ***  Bowel-regimen (pro-biotics, fiber, stool softener,

## 2023-05-30 ENCOUNTER — HOSPITAL ENCOUNTER (OUTPATIENT)
Dept: CT IMAGING | Age: 73
Discharge: HOME | End: 2023-05-30
Attending: INTERNAL MEDICINE

## 2023-05-30 DIAGNOSIS — C34.2 PRIMARY MALIGNANT NEOPLASM OF RIGHT MIDDLE LOBE OF LUNG (HCC): ICD-10-CM

## 2023-05-30 LAB
PERFORMED ON: ABNORMAL
POC CREATININE: 0.7 MG/DL (ref 0.8–1.3)
POC SAMPLE TYPE: ABNORMAL

## 2023-05-30 PROCEDURE — 6360000004 HC RX CONTRAST MEDICATION: Performed by: INTERNAL MEDICINE

## 2023-05-30 PROCEDURE — 82565 ASSAY OF CREATININE: CPT

## 2023-05-30 PROCEDURE — 71260 CT THORAX DX C+: CPT

## 2023-05-30 RX ADMIN — IOPAMIDOL 75 ML: 755 INJECTION, SOLUTION INTRAVENOUS at 13:27

## 2023-06-08 ENCOUNTER — OFFICE VISIT (OUTPATIENT)
Dept: PULMONOLOGY | Age: 73
End: 2023-06-08

## 2023-06-08 VITALS
SYSTOLIC BLOOD PRESSURE: 110 MMHG | DIASTOLIC BLOOD PRESSURE: 84 MMHG | RESPIRATION RATE: 16 BRPM | HEIGHT: 67 IN | OXYGEN SATURATION: 97 % | WEIGHT: 113.2 LBS | BODY MASS INDEX: 17.77 KG/M2 | HEART RATE: 87 BPM

## 2023-06-08 DIAGNOSIS — C34.91 SQUAMOUS CELL CARCINOMA OF RIGHT LUNG (HCC): ICD-10-CM

## 2023-06-08 DIAGNOSIS — J44.9 COPD, MODERATE (HCC): Primary | ICD-10-CM

## 2023-06-08 DIAGNOSIS — C34.92 SQUAMOUS CELL CARCINOMA OF LEFT LUNG (HCC): ICD-10-CM

## 2023-06-08 NOTE — PROGRESS NOTES
Past Medical History:   Diagnosis Date    Allergic rhinitis, cause unspecified 7/15/2010    Cancer ([facility] [address]     lung    Colon polyps     Diabetes mellitus ([facility] [address]     Essential hypertension, benign 7/15/2010    HYPERCHOLESTERAEMIA     Hypertension     Lipoma of other specified sites 7/15/2010    Other abnormal blood chemistry 7/15/2010    Other and unspecified hyperlipidemia 7/15/2010    Other symptoms involving cardiovascular system 7/15/2010    Psychosexual dysfunction with inhibited sexual excitement 7/15/2010       Social History:    Social History     Tobacco Use   Smoking Status Former    Packs/day: 0.50    Years: 40.00    Pack years: 20.00    Types: Cigarettes    Start date: 1965    Quit date: 2017    Years since quittin.1   Smokeless Tobacco Never   Tobacco Comments    To try gum or patch to start quiting classes        Current Medications:  Current Outpatient Medications on File Prior to Visit   Medication Sig Dispense Refill    albuterol sulfate HFA (PROVENTIL;VENTOLIN;PROAIR) 108 (90 Base) MCG/ACT inhaler Inhale 2 puffs into the lungs every 6 hours as needed for Wheezing 18 each 5    umeclidinium-vilanterol (ANORO ELLIPTA) 62.5-25 MCG/ACT inhaler Inhale 1 puff into the lungs daily 1 each 3    lisinopril (PRINIVIL;ZESTRIL) 40 MG tablet TAKE 1 TABLET BY MOUTH EVERY DAY 90 tablet 0    glipiZIDE (GLUCOTROL) 10 MG tablet Take 1 tablet by mouth      aspirin EC 81 MG EC tablet Take 1 tablet by mouth daily 90 tablet 1    amLODIPine (NORVASC) 10 MG tablet TAKE 1 TABLET BY MOUTH EVERY DAY 30 tablet 3    furosemide (LASIX) 20 MG tablet TAKE 1 TABLET BY MOUTH EVERY DAY 90 tablet 1    carvedilol (COREG) 25 MG tablet TAKE 1 TABLET BY MOUTH TWICE A DAY WITH MEALS 180 tablet 1    dapagliflozin (FARXIGA) 10 MG tablet Take 1 tablet by mouth every morning 90 tablet 1    insulin glargine (LANTUS SOLOSTAR) 100 UNIT/ML injection pen Inject 22 Units into the skin nightly (Patient taking differently: Inject 7 Units

## 2023-06-09 ENCOUNTER — OFFICE VISIT (OUTPATIENT)
Dept: SURGERY | Age: 73
End: 2023-06-09

## 2023-06-09 VITALS
SYSTOLIC BLOOD PRESSURE: 112 MMHG | WEIGHT: 113 LBS | HEIGHT: 67 IN | DIASTOLIC BLOOD PRESSURE: 84 MMHG | BODY MASS INDEX: 17.74 KG/M2

## 2023-06-09 DIAGNOSIS — R59.1 LYMPHADENOPATHY: Primary | ICD-10-CM

## 2023-06-14 ENCOUNTER — APPOINTMENT (OUTPATIENT)
Dept: ULTRASOUND IMAGING | Age: 73
End: 2023-06-14
Attending: SURGERY
Payer: MEDICARE

## 2023-06-14 ENCOUNTER — HOSPITAL ENCOUNTER (OUTPATIENT)
Age: 73
Setting detail: OUTPATIENT SURGERY
Discharge: HOME | End: 2023-06-14
Attending: SURGERY | Admitting: SURGERY
Payer: MEDICARE

## 2023-06-14 VITALS
SYSTOLIC BLOOD PRESSURE: 149 MMHG | OXYGEN SATURATION: 94 % | WEIGHT: 108.8 LBS | HEIGHT: 67 IN | TEMPERATURE: 97 F | HEART RATE: 83 BPM | BODY MASS INDEX: 17.08 KG/M2 | DIASTOLIC BLOOD PRESSURE: 104 MMHG | RESPIRATION RATE: 16 BRPM

## 2023-06-14 DIAGNOSIS — R59.1 LYMPHADENOPATHY: ICD-10-CM

## 2023-06-14 DIAGNOSIS — C34.2 PRIMARY CANCER OF RIGHT MIDDLE LOBE OF LUNG (HCC): ICD-10-CM

## 2023-06-14 DIAGNOSIS — R93.89 ABNORMAL ULTRASOUND: ICD-10-CM

## 2023-06-14 LAB
GLUCOSE BLD-MCNC: 117 MG/DL (ref 70–99)
GLUCOSE BLD-MCNC: 144 MG/DL (ref 70–99)
PERFORMED ON: ABNORMAL
PERFORMED ON: ABNORMAL

## 2023-06-14 PROCEDURE — 2709999900 HC NON-CHARGEABLE SUPPLY: Performed by: SURGERY

## 2023-06-14 PROCEDURE — 7100000011 HC PHASE II RECOVERY - ADDTL 15 MIN: Performed by: SURGERY

## 2023-06-14 PROCEDURE — 88305 TISSUE EXAM BY PATHOLOGIST: CPT

## 2023-06-14 PROCEDURE — 2500000003 HC RX 250 WO HCPCS: Performed by: SURGERY

## 2023-06-14 PROCEDURE — 7100000010 HC PHASE II RECOVERY - FIRST 15 MIN: Performed by: SURGERY

## 2023-06-14 PROCEDURE — 6370000000 HC RX 637 (ALT 250 FOR IP): Performed by: ANESTHESIOLOGY

## 2023-06-14 PROCEDURE — 3700000000 HC ANESTHESIA ATTENDED CARE: Performed by: SURGERY

## 2023-06-14 PROCEDURE — 88342 IMHCHEM/IMCYTCHM 1ST ANTB: CPT

## 2023-06-14 PROCEDURE — 88341 IMHCHEM/IMCYTCHM EA ADD ANTB: CPT

## 2023-06-14 PROCEDURE — 3700000001 HC ADD 15 MINUTES (ANESTHESIA): Performed by: SURGERY

## 2023-06-14 PROCEDURE — 3600000004 HC SURGERY LEVEL 4 BASE: Performed by: SURGERY

## 2023-06-14 PROCEDURE — 3600000014 HC SURGERY LEVEL 4 ADDTL 15MIN: Performed by: SURGERY

## 2023-06-14 PROCEDURE — 88185 FLOWCYTOMETRY/TC ADD-ON: CPT

## 2023-06-14 PROCEDURE — 2580000003 HC RX 258: Performed by: ANESTHESIOLOGY

## 2023-06-14 PROCEDURE — 38505 NEEDLE BIOPSY LYMPH NODES: CPT

## 2023-06-14 PROCEDURE — 6360000002 HC RX W HCPCS: Performed by: FAMILY MEDICINE

## 2023-06-14 PROCEDURE — 7100000001 HC PACU RECOVERY - ADDTL 15 MIN: Performed by: SURGERY

## 2023-06-14 PROCEDURE — 7100000000 HC PACU RECOVERY - FIRST 15 MIN: Performed by: SURGERY

## 2023-06-14 PROCEDURE — 88184 FLOWCYTOMETRY/ TC 1 MARKER: CPT

## 2023-06-14 PROCEDURE — A4217 STERILE WATER/SALINE, 500 ML: HCPCS | Performed by: SURGERY

## 2023-06-14 PROCEDURE — 2720000010 HC SURG SUPPLY STERILE: Performed by: SURGERY

## 2023-06-14 PROCEDURE — 38510 BIOPSY/REMOVAL LYMPH NODES: CPT | Performed by: SURGERY

## 2023-06-14 PROCEDURE — 2580000003 HC RX 258: Performed by: SURGERY

## 2023-06-14 RX ORDER — OXYCODONE HYDROCHLORIDE 5 MG/1
5 TABLET ORAL EVERY 8 HOURS PRN
Qty: 9 TABLET | Refills: 0 | Status: SHIPPED | OUTPATIENT
Start: 2023-06-14 | End: 2023-06-17

## 2023-06-14 RX ORDER — FENTANYL CITRATE 50 UG/ML
25 INJECTION, SOLUTION INTRAMUSCULAR; INTRAVENOUS EVERY 5 MIN PRN
Status: DISCONTINUED | OUTPATIENT
Start: 2023-06-14 | End: 2023-06-14 | Stop reason: HOSPADM

## 2023-06-14 RX ORDER — LIDOCAINE HYDROCHLORIDE 10 MG/ML
1 INJECTION, SOLUTION EPIDURAL; INFILTRATION; INTRACAUDAL; PERINEURAL
Status: DISCONTINUED | OUTPATIENT
Start: 2023-06-14 | End: 2023-06-14 | Stop reason: HOSPADM

## 2023-06-14 RX ORDER — SODIUM CHLORIDE 0.9 % (FLUSH) 0.9 %
5-40 SYRINGE (ML) INJECTION PRN
Status: DISCONTINUED | OUTPATIENT
Start: 2023-06-14 | End: 2023-06-14 | Stop reason: HOSPADM

## 2023-06-14 RX ORDER — SODIUM CHLORIDE 9 MG/ML
INJECTION, SOLUTION INTRAVENOUS PRN
Status: DISCONTINUED | OUTPATIENT
Start: 2023-06-14 | End: 2023-06-14 | Stop reason: HOSPADM

## 2023-06-14 RX ORDER — SODIUM CHLORIDE, SODIUM LACTATE, POTASSIUM CHLORIDE, CALCIUM CHLORIDE 600; 310; 30; 20 MG/100ML; MG/100ML; MG/100ML; MG/100ML
INJECTION, SOLUTION INTRAVENOUS CONTINUOUS
Status: DISCONTINUED | OUTPATIENT
Start: 2023-06-14 | End: 2023-06-14 | Stop reason: HOSPADM

## 2023-06-14 RX ORDER — HYDROMORPHONE HYDROCHLORIDE 1 MG/ML
0.5 INJECTION, SOLUTION INTRAMUSCULAR; INTRAVENOUS; SUBCUTANEOUS EVERY 5 MIN PRN
Status: DISCONTINUED | OUTPATIENT
Start: 2023-06-14 | End: 2023-06-14 | Stop reason: HOSPADM

## 2023-06-14 RX ORDER — LABETALOL HYDROCHLORIDE 5 MG/ML
10 INJECTION, SOLUTION INTRAVENOUS
Status: DISCONTINUED | OUTPATIENT
Start: 2023-06-14 | End: 2023-06-14 | Stop reason: HOSPADM

## 2023-06-14 RX ORDER — SODIUM CHLORIDE 0.9 % (FLUSH) 0.9 %
5-40 SYRINGE (ML) INJECTION EVERY 12 HOURS SCHEDULED
Status: DISCONTINUED | OUTPATIENT
Start: 2023-06-14 | End: 2023-06-14 | Stop reason: HOSPADM

## 2023-06-14 RX ORDER — OXYCODONE HYDROCHLORIDE 5 MG/1
5 TABLET ORAL ONCE
Status: COMPLETED | OUTPATIENT
Start: 2023-06-14 | End: 2023-06-14

## 2023-06-14 RX ORDER — BUPIVACAINE HYDROCHLORIDE AND EPINEPHRINE 5; 5 MG/ML; UG/ML
INJECTION, SOLUTION EPIDURAL; INTRACAUDAL; PERINEURAL PRN
Status: DISCONTINUED | OUTPATIENT
Start: 2023-06-14 | End: 2023-06-14 | Stop reason: HOSPADM

## 2023-06-14 RX ORDER — LORAZEPAM 2 MG/ML
0.5 INJECTION INTRAMUSCULAR
Status: DISCONTINUED | OUTPATIENT
Start: 2023-06-14 | End: 2023-06-14 | Stop reason: HOSPADM

## 2023-06-14 RX ORDER — DIPHENHYDRAMINE HYDROCHLORIDE 50 MG/ML
12.5 INJECTION INTRAMUSCULAR; INTRAVENOUS
Status: DISCONTINUED | OUTPATIENT
Start: 2023-06-14 | End: 2023-06-14 | Stop reason: HOSPADM

## 2023-06-14 RX ORDER — MAGNESIUM HYDROXIDE 1200 MG/15ML
LIQUID ORAL CONTINUOUS PRN
Status: DISCONTINUED | OUTPATIENT
Start: 2023-06-14 | End: 2023-06-14 | Stop reason: HOSPADM

## 2023-06-14 RX ORDER — ONDANSETRON 2 MG/ML
4 INJECTION INTRAMUSCULAR; INTRAVENOUS
Status: DISCONTINUED | OUTPATIENT
Start: 2023-06-14 | End: 2023-06-14 | Stop reason: HOSPADM

## 2023-06-14 RX ORDER — ACETAMINOPHEN 325 MG/1
650 TABLET ORAL
Status: DISCONTINUED | OUTPATIENT
Start: 2023-06-14 | End: 2023-06-14 | Stop reason: HOSPADM

## 2023-06-14 RX ORDER — MEPERIDINE HYDROCHLORIDE 25 MG/ML
12.5 INJECTION INTRAMUSCULAR; INTRAVENOUS; SUBCUTANEOUS EVERY 5 MIN PRN
Status: DISCONTINUED | OUTPATIENT
Start: 2023-06-14 | End: 2023-06-14 | Stop reason: HOSPADM

## 2023-06-14 RX ORDER — IPRATROPIUM BROMIDE AND ALBUTEROL SULFATE 2.5; .5 MG/3ML; MG/3ML
1 SOLUTION RESPIRATORY (INHALATION)
Status: DISCONTINUED | OUTPATIENT
Start: 2023-06-14 | End: 2023-06-14 | Stop reason: HOSPADM

## 2023-06-14 RX ORDER — PROCHLORPERAZINE EDISYLATE 5 MG/ML
5 INJECTION INTRAMUSCULAR; INTRAVENOUS
Status: DISCONTINUED | OUTPATIENT
Start: 2023-06-14 | End: 2023-06-14 | Stop reason: HOSPADM

## 2023-06-14 RX ADMIN — HYDROMORPHONE HYDROCHLORIDE 0.5 MG: 1 INJECTION, SOLUTION INTRAMUSCULAR; INTRAVENOUS; SUBCUTANEOUS at 15:58

## 2023-06-14 RX ADMIN — FENTANYL CITRATE 25 MCG: 50 INJECTION, SOLUTION INTRAMUSCULAR; INTRAVENOUS at 16:37

## 2023-06-14 RX ADMIN — OXYCODONE HYDROCHLORIDE 5 MG: 5 TABLET ORAL at 16:31

## 2023-06-14 RX ADMIN — HYDROMORPHONE HYDROCHLORIDE 0.5 MG: 1 INJECTION, SOLUTION INTRAMUSCULAR; INTRAVENOUS; SUBCUTANEOUS at 15:45

## 2023-06-14 RX ADMIN — SODIUM CHLORIDE, POTASSIUM CHLORIDE, SODIUM LACTATE AND CALCIUM CHLORIDE: 600; 310; 30; 20 INJECTION, SOLUTION INTRAVENOUS at 12:30

## 2023-06-14 ASSESSMENT — PAIN SCALES - GENERAL
PAINLEVEL_OUTOF10: 0
PAINLEVEL_OUTOF10: 2
PAINLEVEL_OUTOF10: 6
PAINLEVEL_OUTOF10: 7
PAINLEVEL_OUTOF10: 7
PAINLEVEL_OUTOF10: 4
PAINLEVEL_OUTOF10: 5

## 2023-06-14 ASSESSMENT — PAIN DESCRIPTION - LOCATION
LOCATION: NECK

## 2023-06-14 ASSESSMENT — PAIN DESCRIPTION - ORIENTATION
ORIENTATION: LEFT

## 2023-06-14 ASSESSMENT — PAIN DESCRIPTION - PAIN TYPE
TYPE: ACUTE PAIN;SURGICAL PAIN
TYPE: ACUTE PAIN;SURGICAL PAIN

## 2023-06-14 ASSESSMENT — PAIN DESCRIPTION - ONSET: ONSET: GRADUAL

## 2023-06-14 ASSESSMENT — PAIN DESCRIPTION - FREQUENCY: FREQUENCY: CONTINUOUS

## 2023-06-14 ASSESSMENT — PAIN DESCRIPTION - DESCRIPTORS
DESCRIPTORS: ACHING;DISCOMFORT
DESCRIPTORS: BURNING;ACHING

## 2023-06-14 NOTE — PROGRESS NOTES
Patient received from the OR to PACU #17 post LEFT SUBCLAVICULAR LYMPH NODE EXCISION - Left of Dr. Kimmy Sanchez. Placed on PACU monitoring equipment. Report given per CRNA, OR RN and Dr. Alma Moctezuma. Per report, patient went into the OR very hypertensive. Was treated for low BP intra op. On arrival, patient is arouseable, lynch and denies pain. Good cough effort.

## 2023-06-14 NOTE — PROGRESS NOTES
Ambulatory Surgery/Procedure Discharge Note    Vitals:    06/14/23 1705   BP: (!) 149/104   Pulse: 83   Resp: 16   Temp: 97 °F (36.1 °C)   SpO2: 94%     Patient meets criteria for discharge per cash score. In: 4761 [P.O.:220; I.V.:1300]  Out: 0     Restroom use offered before discharge. Yes    Pain assessment:  present - adequately treated  Pain Level: 2    Pt and S.O./family states \"ready to go home\". Pt alert and oriented x4. IV removed. Denies N/V or pain. Dressing C,D,I. Discharge instructions given to pt and family  with pt permission. Pt and wife verbalized understanding of all instructions. Left with all belongings, X1 prescriptions, and discharge instructions. Patient discharged to home/self care.  Patient discharged via wheel chair by transporter to waiting family/S.O.       6/14/2023 5:19 PM

## 2023-06-14 NOTE — DISCHARGE INSTRUCTIONS
6/14/2023 4:08 PM         PACU:  [phone]   M-F 700 AM - 7 PM      SAME DAY SERVICES:  [phone] M-F 7AM-6PM        If you smoke STOP. We care about your health!

## 2023-06-14 NOTE — H&P
Update History & Physical    The patient's History and Physical during the clinic visit of June 9, 2023 was reviewed with the patient and I examined the patient. There was have been no changes to the patient's health or medications He has been holding his Aspirin for surgery. The surgical site was confirmed by the patient and me. Patient just completed CT-guided wire placement for localization of the node, tolerated well. Plan: The risks, benefits, expected outcome, and alternative to the recommended procedure have been discussed with the patient. Patient understands and wants to proceed with the procedure.      Electronically signed by Raiza Kauffman DO on 6/14/2023 at 1:16 PM

## 2023-06-14 NOTE — BRIEF OP NOTE
Brief Postoperative Note      Patient: Renetta Valerio  YOB: 1950  MRN: [id number]    Date of Procedure: 6/14/2023    Pre-Op Diagnosis Codes:     * Lymphadenopathy [R59.1]    Post-Op Diagnosis: Same       Procedure(s):  NEEDLE LOCALIZED LEFT SUBCLAVICULAR LYMPH NODE EXCISION    Surgeon(s):  Linette Klein MD    Assistant:  Resident: Anika Evans DO    Anesthesia: General    Estimated Blood Loss (mL): 20    Complications: None    Specimens:   ID Type Source Tests Collected by Time Destination   A : LEFT SUBCLAVIAN  LYMPH NODE Tissue Tissue SURGICAL PATHOLOGY Linette Klein MD 6/14/2023 1451        Implants:  * No implants in log *      Drains: * No LDAs found *    Findings: Large lymph node excised, sitting directly over subclavian artery. Small caliber artery supplying lymph node clipped with good hemostasis.   This procedure was not performed to treat cancer         Electronically signed by Anika Evans DO on 6/14/2023 at 3:24 PM

## 2023-06-14 NOTE — PROGRESS NOTES
PACU Transfer to [facility]    Vitals:    06/14/23 1645   BP: (!) 166/91   Pulse: 84   Resp: 21   Temp: 97.1 °F (36.2 °C)   SpO2: 92%   BP is less than baseline value. Dr. Sukumar Welch aware and okay for discharge at this value. Intake/Output Summary (Last 24 hours) at 6/14/2023 1703  Last data filed at 6/14/2023 1645  Gross per 24 hour   Intake 1570 ml   Output 0 ml   Net 1570 ml       Pain assessment:  present - adequately treated, pain pill taken in pacu, dozing at intervals now when not disturbed  Pain Level: 4    Patient transferred to care of [facility] RN. Transferred with all belongings to [facility] per pacu transport. Family is in the [facility] awaiting discharge instructions.     6/14/2023 5:03 PM

## 2023-07-06 PROBLEM — K86.1 CHRONIC PANCREATITIS (HCC): Status: ACTIVE | Noted: 2017-07-12

## 2023-07-06 PROBLEM — C78.02 MALIGNANT NEOPLASM METASTATIC TO LEFT LUNG (HCC): Status: ACTIVE | Noted: 2023-07-06

## 2023-07-06 PROBLEM — R53.0 NEOPLASTIC MALIGNANT RELATED FATIGUE: Status: ACTIVE | Noted: 2023-07-06

## 2023-07-06 PROBLEM — R63.4 WEIGHT LOSS: Status: ACTIVE | Noted: 2023-07-06

## 2023-07-06 PROBLEM — C34.90 MALIGNANT NEOPLASM OF LUNG (HCC): Status: ACTIVE | Noted: 2017-05-03

## 2023-07-06 PROBLEM — Z00.6 CLINICAL TRIAL PARTICIPANT: Status: ACTIVE | Noted: 2023-07-06

## 2023-07-10 ENCOUNTER — OFFICE VISIT (OUTPATIENT)
Dept: PULMONOLOGY | Age: 73
End: 2023-07-10
Payer: MEDICARE

## 2023-07-10 VITALS
WEIGHT: 108 LBS | SYSTOLIC BLOOD PRESSURE: 110 MMHG | BODY MASS INDEX: 16.95 KG/M2 | RESPIRATION RATE: 16 BRPM | HEART RATE: 79 BPM | DIASTOLIC BLOOD PRESSURE: 60 MMHG | OXYGEN SATURATION: 98 % | HEIGHT: 67 IN

## 2023-07-10 DIAGNOSIS — J44.9 COPD, MODERATE (HCC): Primary | ICD-10-CM

## 2023-07-10 DIAGNOSIS — C34.92 SQUAMOUS CELL CARCINOMA OF LEFT LUNG (HCC): ICD-10-CM

## 2023-07-10 DIAGNOSIS — C34.91 SQUAMOUS CELL CARCINOMA OF RIGHT LUNG (HCC): ICD-10-CM

## 2023-07-10 PROCEDURE — 3078F DIAST BP <80 MM HG: CPT | Performed by: INTERNAL MEDICINE

## 2023-07-10 PROCEDURE — 1123F ACP DISCUSS/DSCN MKR DOCD: CPT | Performed by: INTERNAL MEDICINE

## 2023-07-10 PROCEDURE — 3074F SYST BP LT 130 MM HG: CPT | Performed by: INTERNAL MEDICINE

## 2023-07-10 PROCEDURE — 99212 OFFICE O/P EST SF 10 MIN: CPT | Performed by: INTERNAL MEDICINE

## 2023-07-10 NOTE — PROGRESS NOTES
[facility]    Outpatient Follow Up Note    Subjective:   CHIEF COMPLAINT / HPI:     The patient is 74 y.o. male who presents today for COPD, and lung cancer. Armando Shea comes in with his daughter and he had his lymph node biopsy since last visit which confirmed metastatic disease. He has started on a new clinical trial treatment and is trying to stay upbeat despite the fact that he's losing his hair. Interval history:  Armando Shea comes in accompanied by his daughter today. He had a PET scan at the beginning of May that showed increased SUV in his hilum, near the primary lesion, and in a supraclavicular lymph node on the left side. He has been off immunotherapy because he appears to have broken through and is here to discuss his next options. I had a phone call from Dr. Trudy Bernardo who is trying to get him into a clinical trial but he needs additional tissue to assess if he would be a candidate for this new trial.  In addition Armando Shea says his breathing is still terrible, but it is better on the Anoro than it was before. He says his cough is improved but his daughter says he still coughs all the time. Interval history:  Armando Shea has started mowing the lawn again and notes that he get exhausted and needs to take a break. He also has been coughing more and what he coughs up has some color to it now, where it didn't before. It's kind of beige and occurs mostly when he lays down. Albuterol helps with the cough and dyspnea. He has an enlarged hilar node on a CT in March. Interval history:  Armando Shea had his bronchoscopy following his screening CT scan this spring and it confirmed recurrence of squamous cell lung carcinoma. He is actively being treated by Dr. Trudy Bernardo. Things seem to be going fairly well except that he is lost another 2 pounds. He does get some shortness of breath when he tries to mow the lawn but he is doing his best to stay active.   He had a PET scan earlier this month as part of

## 2023-07-17 PROBLEM — C78.02 MALIGNANT NEOPLASM METASTATIC TO LEFT LUNG (HCC): Status: ACTIVE | Noted: 2022-05-19

## 2023-07-17 PROBLEM — C34.90 NON-SMALL CELL LUNG CANCER (HCC): Status: ACTIVE | Noted: 2017-04-24

## 2023-07-18 ENCOUNTER — OFFICE VISIT (OUTPATIENT)
Dept: SURGERY | Age: 73
End: 2023-07-18

## 2023-07-18 VITALS
WEIGHT: 107.6 LBS | SYSTOLIC BLOOD PRESSURE: 117 MMHG | BODY MASS INDEX: 16.89 KG/M2 | HEIGHT: 67 IN | TEMPERATURE: 97.3 F | HEART RATE: 68 BPM | DIASTOLIC BLOOD PRESSURE: 66 MMHG

## 2023-07-18 DIAGNOSIS — R59.1 LYMPHADENOPATHY: Primary | ICD-10-CM

## 2023-07-18 DIAGNOSIS — Z48.89 ENCOUNTER FOR POST SURGICAL WOUND CHECK: ICD-10-CM

## 2023-07-18 PROCEDURE — 99024 POSTOP FOLLOW-UP VISIT: CPT | Performed by: NURSE PRACTITIONER

## 2023-08-01 ENCOUNTER — HOSPITAL ENCOUNTER (OUTPATIENT)
Dept: CT IMAGING | Age: 73
Discharge: HOME | End: 2023-08-01
Attending: INTERNAL MEDICINE

## 2023-08-01 DIAGNOSIS — C34.2 PRIMARY MALIGNANT NEOPLASM OF RIGHT MIDDLE LOBE OF LUNG (HCC): ICD-10-CM

## 2023-08-01 LAB
PERFORMED ON: ABNORMAL
POC CREATININE: 1.5 MG/DL (ref 0.8–1.3)
POC SAMPLE TYPE: ABNORMAL

## 2023-08-01 PROCEDURE — 6360000004 HC RX CONTRAST MEDICATION: Performed by: INTERNAL MEDICINE

## 2023-08-01 PROCEDURE — 2500000003 HC RX 250 WO HCPCS: Performed by: INTERNAL MEDICINE

## 2023-08-01 PROCEDURE — 82565 ASSAY OF CREATININE: CPT

## 2023-08-01 PROCEDURE — 71260 CT THORAX DX C+: CPT

## 2023-08-01 RX ADMIN — BARIUM SULFATE 900 ML: 20 SUSPENSION ORAL at 11:52

## 2023-08-01 RX ADMIN — IOPAMIDOL 75 ML: 755 INJECTION, SOLUTION INTRAVENOUS at 11:52

## 2023-09-12 ENCOUNTER — HOSPITAL ENCOUNTER (OUTPATIENT)
Dept: CT IMAGING | Age: 73
Discharge: HOME | End: 2023-09-12
Attending: INTERNAL MEDICINE

## 2023-09-12 DIAGNOSIS — C34.2 PRIMARY MALIGNANT NEOPLASM OF RIGHT MIDDLE LOBE OF LUNG (HCC): ICD-10-CM

## 2023-09-12 LAB
PERFORMED ON: NORMAL
POC CREATININE: 1.1 MG/DL (ref 0.8–1.3)
POC SAMPLE TYPE: NORMAL

## 2023-09-12 PROCEDURE — 6360000004 HC RX CONTRAST MEDICATION: Performed by: INTERNAL MEDICINE

## 2023-09-12 PROCEDURE — 71260 CT THORAX DX C+: CPT

## 2023-09-12 PROCEDURE — 2500000003 HC RX 250 WO HCPCS: Performed by: INTERNAL MEDICINE

## 2023-09-12 PROCEDURE — 82565 ASSAY OF CREATININE: CPT

## 2023-09-12 RX ADMIN — BARIUM SULFATE 450 ML: 20 SUSPENSION ORAL at 09:50

## 2023-09-12 RX ADMIN — IOPAMIDOL 75 ML: 755 INJECTION, SOLUTION INTRAVENOUS at 09:48

## 2023-10-23 ENCOUNTER — OFFICE VISIT (OUTPATIENT)
Dept: PULMONOLOGY | Age: 73
End: 2023-10-23
Payer: MEDICARE

## 2023-10-23 VITALS
RESPIRATION RATE: 16 BRPM | OXYGEN SATURATION: 99 % | DIASTOLIC BLOOD PRESSURE: 70 MMHG | WEIGHT: 103 LBS | BODY MASS INDEX: 16.17 KG/M2 | HEART RATE: 78 BPM | SYSTOLIC BLOOD PRESSURE: 120 MMHG | HEIGHT: 67 IN

## 2023-10-23 DIAGNOSIS — C34.92 SQUAMOUS CELL CARCINOMA OF LEFT LUNG (HCC): ICD-10-CM

## 2023-10-23 DIAGNOSIS — C34.91 SQUAMOUS CELL CARCINOMA OF RIGHT LUNG (HCC): ICD-10-CM

## 2023-10-23 DIAGNOSIS — J44.9 COPD, MODERATE (HCC): Primary | ICD-10-CM

## 2023-10-23 DIAGNOSIS — R06.2 WHEEZING: ICD-10-CM

## 2023-10-23 PROCEDURE — 3074F SYST BP LT 130 MM HG: CPT | Performed by: INTERNAL MEDICINE

## 2023-10-23 PROCEDURE — 99214 OFFICE O/P EST MOD 30 MIN: CPT | Performed by: INTERNAL MEDICINE

## 2023-10-23 PROCEDURE — 3078F DIAST BP <80 MM HG: CPT | Performed by: INTERNAL MEDICINE

## 2023-10-23 PROCEDURE — 1123F ACP DISCUSS/DSCN MKR DOCD: CPT | Performed by: INTERNAL MEDICINE

## 2023-10-23 RX ORDER — UMECLIDINIUM BROMIDE AND VILANTEROL TRIFENATATE 62.5; 25 UG/1; UG/1
1 POWDER RESPIRATORY (INHALATION) DAILY
Qty: 1 EACH | Refills: 3 | Status: SHIPPED | OUTPATIENT
Start: 2023-10-23

## 2023-10-23 RX ORDER — ALBUTEROL SULFATE 90 UG/1
2 AEROSOL, METERED RESPIRATORY (INHALATION) EVERY 6 HOURS PRN
Qty: 18 EACH | Refills: 5 | Status: SHIPPED | OUTPATIENT
Start: 2023-10-23

## 2023-10-23 NOTE — PROGRESS NOTES
recurrent or residual neoplasm. Subcentimeter pretracheal lymph node unchanged benign. No significant lymphadenopathy. Coronary artery calcification which is a risk factor for acute coronary syndrome. Mild compression fracture superior endplate T7 with adjacent bony sclerosis may relate to posttraumatic or osteoporotic compression fracture. Pathologic compression fracture is not excluded. Continued follow-up recommended. Findings present previously     9/2019  Impression       Consolidation/atelectasis and volume loss in the right thorax without change. Chronic pancreatitis. Stable small left pulmonary nodules. Last PFTs: CONCLUSION:  Moderate obstructive defect without bronchodilator  response. Air trapping present and moderate decrease in diffusion. These findings are most consistent with the diagnosis of COPD. Assessment: This is a 74 y.o. male with metastatic squamous cell lung cancer COPD,  and CHF with EF 40-45%    Plan:     COPD: Stable disease, continue Anoro and albuterol. Remains underweight, and has lost more. I refilled these prescriptions. Lung cancer: Recurrence of squamous cell carcinoma has not gone well and he has already failed the clinical trial.  I reviewed the images with him and we discussed how longevity is unknown but that to qualify for hospice it has to be assumed that the patient is unlikely to live another 6 months. If they make it 8 months etc. that is okay. No one really knows. He indicated that he has his affairs in order and that he is good, he just hopes he makes it through [place] and he can be with family. They are planning multiple trips to the [place] since he likes to [employer]. However he does not have much exercise tolerance and tires very quickly. Diagnosis Orders   1. COPD, moderate ([address])        2. Wheezing  albuterol sulfate HFA (PROVENTIL;VENTOLIN;PROAIR) 108 (90 Base) MCG/ACT inhaler      3.  Squamous cell

## 2024-01-01 NOTE — ASSESSMENT & PLAN NOTE
Borderline controlled, changes made today: cont current meds but will lower AM target FBS to 100 from 100 and cont to adjust L[place]
Cont. With diet and meds
Continue current meds
Has remains stable on current meds etc needs COVID vaccination
No recent symptoms cont diet and meds as noted
Now 4 years post Tx has remained disease free as noted cont routine F/U
Remains disease free see above and notes from Dr Val Lucero
Well-controlled, continue current treatment plan, lifestyle modifications recommended and repeat labs pending cont F/U with cardilogy
hard copy, drawn during this pregnancy

## 2024-01-08 NOTE — CONSULTS
Oncology Nutrition Note    RECOMMENDATIONS:   PO Diet: General diet w/high beau/high protein food choices. Encourage breakfast meal daily. Dietary protein at all meals/snacks. ONS: Switch to Ensure Complete and increase to BID. Monitor BS   PO Fluid intake- increase water intake up to minimum 48 oz fluids per day. Nutrition Education:   5/25 High Beau/protein discussed; meal plan handout provided. 5. Nutrition follow-up with RD in 2 weeks. Next RD appt 6/7/23. NUTRITION ASSESSMENT:   Nutritional summary & status:   Nutritionally pt meets criteria of malnutrition considering his current weight loss along with visible muscle/fat wasting. However dietary recall indicating regular meal pattern and 2/3 meals w/adequate portions. He does visibly have muscle/fat wasting, but KEVIN if this has worsened w/his BW being < 120 lb for the last year. He denies any barriers s/s to eating/drinking, other than what this RD can hear from his diet recall as inconsistent meal pattern. Also noted pt using low calorie Ensure QD. Has low water intake as well. RD discussed in detail w/daily meal plan provided to increase his daily PO intake. Suggesting pt switch ONS to high beau/high pro ONS and increase to BID. Provided handouts on high beau/high pro foods. Nutrition Related Findings: upper/bottome dentures; lbm this AM Wounds: None   Nutrition Goals:  Pt will incorporate breakfast on a daily basis, increase ONS up to BID; and increase water intake by 8-16 oz per day to improve weight/nutrition status. Oncology Hx:   COPD, RUL NSCLC (squamous cell carcinoma) diagnosed in 2017 s/p CXRT completed 7/2017; Stage PETRA L Lung Ca (dx 6/3/2022); Nivolumab/ipilimumab, July 2022-May 2023; Evidence of progressive disease with new disease in the left supraclavicular area and the left hilum, May 2023; being considered for clin trial. PMH of DM and pancreatitis.      MALNUTRITION ASSESSMENT  Context of Malnutrition: Chronic Illness
no hearing difficulty/no sinus symptoms/no nasal discharge/no throat pain

## 2024-09-08 NOTE — ASSESSMENT & PLAN NOTE
Monitored by specialist- no acute findings meriting change in the plan cont current meds and F/u The following room check was performed for environmental safety of the patient.       DOORWAY        Contraband/Damage? No         BATHROOM       Contraband/Damage? No      PAPER GARBAGE BAG     Contraband/Damage? No      BEDS            Contraband/Damage?  No      WALLS       Contraband/Damage? No       UNDER MATTRESSES       Contraband/Damage? No      CLOTHING SHELVES     Contraband/Damage? No       DRESSERS       Contraband/Damage? No      CHAIRS       Contraband/Damage?  No      WINDOW CELL       Contraband/Damage? No       FLOOR       Contraband/Damage?  No      If contraband found, incident report filed?   If damage found, was charge nurse and manager notified?   If damage found, incident report or work order placed?

## (undated) DEVICE — Device

## (undated) DEVICE — CATHETER URETH 18FR BLLN 5CC SIL HYDRGEL 2 W F SPEC CARS M

## (undated) DEVICE — FORCEPS BIOPSY SMOOTH CUP

## (undated) DEVICE — NEEDLE BRONCHSCP 21GA HNDL SHTH NDL STYL PERIVIEW FLX

## (undated) DEVICE — SHEET,DRAPE,40X58,STERILE: Brand: MEDLINE

## (undated) DEVICE — BLADE ES ELASTOMERIC COAT INSUL DURABLE BEND UPTO 90DEG

## (undated) DEVICE — AGENT HEMSTAT 3GM OXIDIZED REGENERATED CELOS ABSRB FOR CONT (ORDER MULTIPLES OF 5EA)

## (undated) DEVICE — CYSTO PACK: Brand: MEDLINE INDUSTRIES, INC.

## (undated) DEVICE — PROVE COVER: Brand: UNBRANDED

## (undated) DEVICE — STERILE POLYISOPRENE POWDER-FREE SURGICAL GLOVES: Brand: PROTEXIS

## (undated) DEVICE — TOWEL,STOP FLAG GOLD N-W: Brand: MEDLINE

## (undated) DEVICE — Device: Brand: MEDEX

## (undated) DEVICE — CLEANER,CAUTERY TIP,2X2",STERILE: Brand: MEDLINE

## (undated) DEVICE — GLOVE SURG SZ 7 L12IN FNGR THK75MIL WHT LTX POLYMER BEAD

## (undated) DEVICE — NEEDLE HYPO 22GA L1.5IN BLK POLYPR HUB S STL REG BVL STR

## (undated) DEVICE — ELECTRODE PT RET AD L9FT HI MOIST COND ADH HYDRGEL CORDED

## (undated) DEVICE — SUTURE VCRL SZ 2-0 L18IN ABSRB UD POLYGLACTIN 910 BRAID TIE J111T

## (undated) DEVICE — SOLUTION IRRIG 2000ML 0.9% SOD CHL USP UROMATIC PLAS CONT

## (undated) DEVICE — SYRINGE MED 10ML TRNSLUC BRL PLUNG BLK MRK POLYPR CTRL

## (undated) DEVICE — BLADE CLIPPER GEN PURP NS

## (undated) DEVICE — Z INACTIVE USE 2635504 SOLUTION IRRIG 3000ML 1.5% GL USP UROMATIC CONT

## (undated) DEVICE — SUTURE VCRL SZ 3-0 L27IN ABSRB UD L26MM SH 1/2 CIR J416H

## (undated) DEVICE — SUTURE VCRL SZ 2-0 L18IN ABSRB VLT L26MM SH 1/2 CIR J775D

## (undated) DEVICE — SOLUTION IV IRRIG WATER 1000ML POUR BRL 2F7114

## (undated) DEVICE — GLOVE SURG SZ 7 L12IN FNGR THK79MIL GRN LTX FREE

## (undated) DEVICE — SUTURE PROL SZ 0 L18IN NONABSORBABLE BLU MO-6 L26MM 1/2 CIR C845G

## (undated) DEVICE — CHLORAPREP 26ML ORANGE

## (undated) DEVICE — Z DISCONTINUED USE 2749457 TUBING SAMP AD W12.5XH8.4IN D9.1IN NSL ORAL SMRT CAPNOLINE

## (undated) DEVICE — VALVE SHEATH BRONCHOSCOPE

## (undated) DEVICE — CABLE ENDOSCP L10FT ACT DISP

## (undated) DEVICE — GAUZE,SPONGE,4"X4",8PLY,STRL,LF,10/TRAY: Brand: MEDLINE

## (undated) DEVICE — SHEET, T, LAPAROTOMY, STERILE: Brand: MEDLINE

## (undated) DEVICE — CATHETER URETH 16FR BLLN 5CC 2 W F SPEC M OLV COUDE TIP

## (undated) DEVICE — TUBING FLUIDICS BRONCHOSCOPE

## (undated) DEVICE — NEEDLE,22GX1.5",REG,BEVEL: Brand: MEDLINE

## (undated) DEVICE — BRONCHOSCOPES MONARCH

## (undated) DEVICE — TELFA NON-ADHERENT ABSORBENT DRESSING: Brand: TELFA

## (undated) DEVICE — SOLUTION IV IRRIG POUR BRL 0.9% SODIUM CHL 2F7124

## (undated) DEVICE — APPLICATOR MEDICATED 26 CC SOLUTION HI LT ORNG CHLORAPREP

## (undated) DEVICE — TRAY PREP DRY W/ PREM GLV 2 APPL 6 SPNG 2 UNDPD 1 OVERWRAP

## (undated) DEVICE — BAG DRNGE L6FT 20L PREFIL ABSRB POLYMER EXP TBNG DISP FOR

## (undated) DEVICE — GLOVE ORANGE PI 7   MSG9070

## (undated) DEVICE — PENROSE DRAIN 12" X 1/2": Brand: CARDINAL HEALTH

## (undated) DEVICE — GENERAL: Brand: MEDLINE INDUSTRIES, INC.

## (undated) DEVICE — TOTAL TRAY, DB, 100% SILI FOLEY, 16FR 10: Brand: MEDLINE

## (undated) DEVICE — KIT OR ROOM TURNOVER W/STRAP

## (undated) DEVICE — SHEET,T,THYROID,STERILE: Brand: MEDLINE

## (undated) DEVICE — KIT INTRODUCER BRONCHOSCOPE PATIENT

## (undated) DEVICE — SUTURE VCRL SZ 2-0 L27IN ABSRB UD L26MM SH 1/2 CIR J417H

## (undated) DEVICE — COVER LT HNDL BLU PLAS

## (undated) DEVICE — SYRINGE MED 10ML SLIP TIP BLNT FILL AND LUERLOCK DISP

## (undated) DEVICE — APPLIER LIG CLP M L11IN TI STR RNG HNDL FOR 20 CLP DISP

## (undated) DEVICE — SUTURE VCRL + SZ 3-0 L18IN ABSRB UD SH 1/2 CIR TAPERCUT NDL VCP864D

## (undated) DEVICE — LIQUIBAND RAPID ADHESIVE 36/CS 0.8ML: Brand: MEDLINE

## (undated) DEVICE — BAG,DRAINAGE,4L,A/R TOWER,LL,SLIDE TAP: Brand: MEDLINE